# Patient Record
Sex: MALE | Race: WHITE
[De-identification: names, ages, dates, MRNs, and addresses within clinical notes are randomized per-mention and may not be internally consistent; named-entity substitution may affect disease eponyms.]

---

## 2020-08-29 ENCOUNTER — HOSPITAL ENCOUNTER (INPATIENT)
Dept: HOSPITAL 62 - ER | Age: 60
LOS: 13 days | Discharge: NURSING, REHAB OR LONG-TERM CARE | DRG: 640 | End: 2020-09-11
Attending: INTERNAL MEDICINE | Admitting: INTERNAL MEDICINE
Payer: MEDICARE

## 2020-08-29 DIAGNOSIS — Z82.49: ICD-10-CM

## 2020-08-29 DIAGNOSIS — K70.30: ICD-10-CM

## 2020-08-29 DIAGNOSIS — I11.0: ICD-10-CM
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
## 2020-08-29 DIAGNOSIS — I50.33: ICD-10-CM

## 2020-08-29 DIAGNOSIS — N28.9: ICD-10-CM

## 2020-08-29 DIAGNOSIS — D50.9: ICD-10-CM

## 2020-08-29 DIAGNOSIS — M10.9: ICD-10-CM

## 2020-08-29 DIAGNOSIS — Z79.899: ICD-10-CM

## 2020-08-29 DIAGNOSIS — E87.1: Primary | ICD-10-CM

## 2020-08-29 DIAGNOSIS — Z11.59: ICD-10-CM

## 2020-08-29 DIAGNOSIS — R00.1: ICD-10-CM

## 2020-08-29 DIAGNOSIS — G47.33: ICD-10-CM

## 2020-08-29 DIAGNOSIS — E87.5: ICD-10-CM

## 2020-08-29 DIAGNOSIS — D69.6: ICD-10-CM

## 2020-08-29 DIAGNOSIS — N17.9: ICD-10-CM

## 2020-08-29 DIAGNOSIS — E66.01: ICD-10-CM

## 2020-08-29 DIAGNOSIS — E83.42: ICD-10-CM

## 2020-08-29 PROCEDURE — 82746 ASSAY OF FOLIC ACID SERUM: CPT

## 2020-08-29 PROCEDURE — 36415 COLL VENOUS BLD VENIPUNCTURE: CPT

## 2020-08-29 PROCEDURE — 82607 VITAMIN B-12: CPT

## 2020-08-29 PROCEDURE — 82553 CREATINE MB FRACTION: CPT

## 2020-08-29 PROCEDURE — 93005 ELECTROCARDIOGRAM TRACING: CPT

## 2020-08-29 PROCEDURE — 96374 THER/PROPH/DIAG INJ IV PUSH: CPT

## 2020-08-29 PROCEDURE — 84484 ASSAY OF TROPONIN QUANT: CPT

## 2020-08-29 PROCEDURE — 99221 1ST HOSP IP/OBS SF/LOW 40: CPT

## 2020-08-29 PROCEDURE — 82550 ASSAY OF CK (CPK): CPT

## 2020-08-29 PROCEDURE — 85027 COMPLETE CBC AUTOMATED: CPT

## 2020-08-29 PROCEDURE — 82140 ASSAY OF AMMONIA: CPT

## 2020-08-29 PROCEDURE — 82962 GLUCOSE BLOOD TEST: CPT

## 2020-08-29 PROCEDURE — 84439 ASSAY OF FREE THYROXINE: CPT

## 2020-08-29 PROCEDURE — 99291 CRITICAL CARE FIRST HOUR: CPT

## 2020-08-29 PROCEDURE — 94660 CPAP INITIATION&MGMT: CPT

## 2020-08-29 PROCEDURE — 87070 CULTURE OTHR SPECIMN AEROBIC: CPT

## 2020-08-29 PROCEDURE — 83735 ASSAY OF MAGNESIUM: CPT

## 2020-08-29 PROCEDURE — 96361 HYDRATE IV INFUSION ADD-ON: CPT

## 2020-08-29 PROCEDURE — 85045 AUTOMATED RETICULOCYTE COUNT: CPT

## 2020-08-29 PROCEDURE — 87635 SARS-COV-2 COVID-19 AMP PRB: CPT

## 2020-08-29 PROCEDURE — 72100 X-RAY EXAM L-S SPINE 2/3 VWS: CPT

## 2020-08-29 PROCEDURE — 96375 TX/PRO/DX INJ NEW DRUG ADDON: CPT

## 2020-08-29 PROCEDURE — 81001 URINALYSIS AUTO W/SCOPE: CPT

## 2020-08-29 PROCEDURE — 83605 ASSAY OF LACTIC ACID: CPT

## 2020-08-29 PROCEDURE — 71045 X-RAY EXAM CHEST 1 VIEW: CPT

## 2020-08-29 PROCEDURE — 83935 ASSAY OF URINE OSMOLALITY: CPT

## 2020-08-29 PROCEDURE — 80076 HEPATIC FUNCTION PANEL: CPT

## 2020-08-29 PROCEDURE — 82728 ASSAY OF FERRITIN: CPT

## 2020-08-29 PROCEDURE — 83930 ASSAY OF BLOOD OSMOLALITY: CPT

## 2020-08-29 PROCEDURE — 83550 IRON BINDING TEST: CPT

## 2020-08-29 PROCEDURE — 80048 BASIC METABOLIC PNL TOTAL CA: CPT

## 2020-08-29 PROCEDURE — 82803 BLOOD GASES ANY COMBINATION: CPT

## 2020-08-29 PROCEDURE — 99285 EMERGENCY DEPT VISIT HI MDM: CPT

## 2020-08-29 PROCEDURE — 93010 ELECTROCARDIOGRAM REPORT: CPT

## 2020-08-29 PROCEDURE — 84443 ASSAY THYROID STIM HORMONE: CPT

## 2020-08-29 PROCEDURE — C9803 HOPD COVID-19 SPEC COLLECT: HCPCS

## 2020-08-29 PROCEDURE — 93306 TTE W/DOPPLER COMPLETE: CPT

## 2020-08-29 PROCEDURE — 83880 ASSAY OF NATRIURETIC PEPTIDE: CPT

## 2020-08-29 PROCEDURE — 94640 AIRWAY INHALATION TREATMENT: CPT

## 2020-08-29 PROCEDURE — 96376 TX/PRO/DX INJ SAME DRUG ADON: CPT

## 2020-08-29 PROCEDURE — 80061 LIPID PANEL: CPT

## 2020-08-29 PROCEDURE — 85025 COMPLETE CBC W/AUTO DIFF WBC: CPT

## 2020-08-29 PROCEDURE — 84300 ASSAY OF URINE SODIUM: CPT

## 2020-08-29 PROCEDURE — 84100 ASSAY OF PHOSPHORUS: CPT

## 2020-08-29 PROCEDURE — 80053 COMPREHEN METABOLIC PANEL: CPT

## 2020-08-29 PROCEDURE — 83540 ASSAY OF IRON: CPT

## 2020-08-29 PROCEDURE — P9047 ALBUMIN (HUMAN), 25%, 50ML: HCPCS

## 2020-08-30 LAB
ABSOLUTE LYMPHOCYTES# (MANUAL): 0.5 10^3/UL (ref 0.5–4.7)
ABSOLUTE MONOCYTES # (MANUAL): 1 10^3/UL (ref 0.1–1.4)
ADD MANUAL DIFF: YES
ALBUMIN SERPL-MCNC: 3.6 G/DL (ref 3.5–5)
ALBUMIN SERPL-MCNC: 3.6 G/DL (ref 3.5–5)
ALP SERPL-CCNC: 136 U/L (ref 38–126)
ALP SERPL-CCNC: 147 U/L (ref 38–126)
ANION GAP SERPL CALC-SCNC: 10 MMOL/L (ref 5–19)
ANION GAP SERPL CALC-SCNC: 10 MMOL/L (ref 5–19)
ANION GAP SERPL CALC-SCNC: 8 MMOL/L (ref 5–19)
ANION GAP SERPL CALC-SCNC: 9 MMOL/L (ref 5–19)
ANISOCYTOSIS BLD QL SMEAR: (no result)
APPEARANCE UR: CLEAR
APTT PPP: YELLOW S
ARTERIAL BLOOD FIO2: (no result)
ARTERIAL BLOOD H2CO3: 1.14 MMOL/L (ref 1.05–1.35)
ARTERIAL BLOOD HCO3: 20.1 MMOL/L (ref 20–24)
ARTERIAL BLOOD PCO2: 37.8 MMHG (ref 35–45)
ARTERIAL BLOOD PH: 7.34 (ref 7.35–7.45)
ARTERIAL BLOOD PO2: 97.5 MMHG (ref 80–100)
ARTERIAL BLOOD TOTAL CO2: 21.3 MMOL/L (ref 23–27)
AST SERPL-CCNC: 56 U/L (ref 17–59)
AST SERPL-CCNC: 84 U/L (ref 17–59)
BASE EXCESS BLDA CALC-SCNC: -5.1 MMOL/L
BASOPHILS NFR BLD MANUAL: 0 % (ref 0–2)
BILIRUB DIRECT SERPL-MCNC: 1.2 MG/DL (ref 0–0.4)
BILIRUB DIRECT SERPL-MCNC: 1.2 MG/DL (ref 0–0.4)
BILIRUB SERPL-MCNC: 2 MG/DL (ref 0.2–1.3)
BILIRUB SERPL-MCNC: 2.2 MG/DL (ref 0.2–1.3)
BILIRUB UR QL STRIP: NEGATIVE
BUN SERPL-MCNC: 32 MG/DL (ref 7–20)
BUN SERPL-MCNC: 33 MG/DL (ref 7–20)
BUN SERPL-MCNC: 33 MG/DL (ref 7–20)
BUN SERPL-MCNC: 34 MG/DL (ref 7–20)
BUN SERPL-MCNC: 36 MG/DL (ref 7–20)
BUN SERPL-MCNC: 37 MG/DL (ref 7–20)
CALCIUM: 8.7 MG/DL (ref 8.4–10.2)
CALCIUM: 9.2 MG/DL (ref 8.4–10.2)
CALCIUM: 9.3 MG/DL (ref 8.4–10.2)
CALCIUM: 9.4 MG/DL (ref 8.4–10.2)
CHLORIDE SERPL-SCNC: 83 MMOL/L (ref 98–107)
CHLORIDE SERPL-SCNC: 84 MMOL/L (ref 98–107)
CHLORIDE SERPL-SCNC: 86 MMOL/L (ref 98–107)
CHLORIDE SERPL-SCNC: 86 MMOL/L (ref 98–107)
CK MB SERPL-MCNC: 4.09 NG/ML (ref ?–4.55)
CK SERPL-CCNC: 184 U/L (ref 55–170)
CK SERPL-CCNC: 195 U/L (ref 55–170)
CO2 SERPL-SCNC: 21 MMOL/L (ref 22–30)
CO2 SERPL-SCNC: 22 MMOL/L (ref 22–30)
CO2 SERPL-SCNC: 23 MMOL/L (ref 22–30)
CO2 SERPL-SCNC: 24 MMOL/L (ref 22–30)
EOSINOPHIL NFR BLD MANUAL: 0 % (ref 0–6)
ERYTHROCYTE [DISTWIDTH] IN BLOOD BY AUTOMATED COUNT: 15.5 % (ref 11.5–14)
GLUCOSE SERPL-MCNC: 105 MG/DL (ref 75–110)
GLUCOSE SERPL-MCNC: 106 MG/DL (ref 75–110)
GLUCOSE SERPL-MCNC: 112 MG/DL (ref 75–110)
GLUCOSE SERPL-MCNC: 120 MG/DL (ref 75–110)
GLUCOSE SERPL-MCNC: 145 MG/DL (ref 75–110)
GLUCOSE SERPL-MCNC: 260 MG/DL (ref 75–110)
GLUCOSE UR STRIP-MCNC: NEGATIVE MG/DL
HCT VFR BLD CALC: 31 % (ref 37.9–51)
HGB BLD-MCNC: 10.8 G/DL (ref 13.5–17)
KETONES UR STRIP-MCNC: NEGATIVE MG/DL
MCH RBC QN AUTO: 33.9 PG (ref 27–33.4)
MCHC RBC AUTO-ENTMCNC: 34.7 G/DL (ref 32–36)
MCV RBC AUTO: 98 FL (ref 80–97)
MONOCYTES % (MANUAL): 6 % (ref 3–13)
NITRITE UR QL STRIP: NEGATIVE
OSMOLALITY,URINE: 297 MOSM/KG (ref 300–900)
PH UR STRIP: 6 [PH] (ref 5–9)
PLATELET # BLD: 85 10^3/UL (ref 150–450)
PLATELET COMMENT: (no result)
POLYCHROMASIA BLD QL SMEAR: SLIGHT
POTASSIUM SERPL-SCNC: 6.6 MMOL/L (ref 3.6–5)
POTASSIUM SERPL-SCNC: 6.7 MMOL/L (ref 3.6–5)
POTASSIUM SERPL-SCNC: 6.9 MMOL/L (ref 3.6–5)
POTASSIUM SERPL-SCNC: 7.1 MMOL/L (ref 3.6–5)
POTASSIUM SERPL-SCNC: 7.5 MMOL/L (ref 3.6–5)
POTASSIUM SERPL-SCNC: 7.6 MMOL/L (ref 3.6–5)
PROT SERPL-MCNC: 7 G/DL (ref 6.3–8.2)
PROT SERPL-MCNC: 7.1 G/DL (ref 6.3–8.2)
PROT UR STRIP-MCNC: NEGATIVE MG/DL
RBC # BLD AUTO: 3.17 10^6/UL (ref 4.35–5.55)
SAO2 % BLDA: 97.1 % (ref 94–98)
SCHISTOCYTES BLD QL SMEAR: SLIGHT
SEGMENTED NEUTROPHILS % (MAN): 91 % (ref 42–78)
SP GR UR STRIP: 1.01
TOTAL CELLS COUNTED BLD: 100
TROPONIN I SERPL-MCNC: < 0.012 NG/ML
URINE SODIUM: 34 MMOL/L (ref 30–90)
UROBILINOGEN UR-MCNC: NEGATIVE MG/DL (ref ?–2)
VARIANT LYMPHS NFR BLD MANUAL: 3 % (ref 13–45)
WBC # BLD AUTO: 16.1 10^3/UL (ref 4–10.5)

## 2020-08-30 RX ADMIN — CALCIUM GLUCONATE SCH: 20 INJECTION, SOLUTION INTRAVENOUS at 20:23

## 2020-08-30 RX ADMIN — IPRATROPIUM BROMIDE AND ALBUTEROL SULFATE SCH ML: 2.5; .5 SOLUTION RESPIRATORY (INHALATION) at 21:06

## 2020-08-30 RX ADMIN — LACTULOSE SCH GM: 20 SOLUTION ORAL at 19:18

## 2020-08-30 RX ADMIN — CALCIUM GLUCONATE SCH MLS/HR: 20 INJECTION, SOLUTION INTRAVENOUS at 19:18

## 2020-08-30 RX ADMIN — Medication SCH ML: at 22:50

## 2020-08-30 RX ADMIN — DEMECLOCYCLINE HYDROCHLORIDE SCH MG: 150 TABLET ORAL at 19:18

## 2020-08-30 RX ADMIN — DOCUSATE SODIUM SCH MG: 100 CAPSULE, LIQUID FILLED ORAL at 19:18

## 2020-08-30 RX ADMIN — DEXAMETHASONE SODIUM PHOSPHATE PRN MG: 10 INJECTION INTRAMUSCULAR; INTRAVENOUS at 20:48

## 2020-08-30 NOTE — CRITICAL CARE ADMISSION REPORT
HPI


Date:: 08/30/20


Time:: 17:00


Reason for ICU Reason:: Sever hyponatremia.  Significant hyperkalemia.  Anasarca


Admission Date/Time & PCP: 


Admission Date/Time: 08/30/20 14:50





 Primary Care Provider: 








BARTOLOME: 


The patient presents with generalized weakness and obtundation.


He fell in his home yesterday as well.


He lives with his sister.


The patient has a history significant for CHF and Cirrhosis. When he had 

routinelabs doen they showed severe hyponatremaiand hyperkalemia.


The patient was found to have an intial Na+ of 114 and potassium of 7.6.


His creatinine is 2.24. (for GFR of 30).


The patient has additional history of Atrial fib but denies being on antiembolic

treatment.


At this time the patient is conversant with me , speaks clearly and has decent 

comprehension.


His serum osmolality is 252.


His urien osmolality is 297 and urine Na 34.


The patient has fairly massive peripheral edema.


He has been on large dose of Spironolactone and lasix at home.


His ammonia levl is about 80.


he appears to be hemodynamically stable





Plan Summary: 


Assessment and plan


Hyponatremia


This is a patient who appears to have hypotonic hypervolemic hyponatremia. It 

apears that the


cause may be multifactorial.


The patient has a history of cirrhosis ( on which basis is may have 

hypoaldosteronism and spironolactone, volume overload due to CHF.


The patient has been treated with small doses of NACL not likely to have much of

an impact as his urine osmolarity is fairly close to theosmiolality of NACL.


I have added salt tablets and laced the patient on declomycin (ADH inhibitor). I

have started large dose IV lasic as well. We will monotor


the lytes q 6h or more frequently if need be.


Hyperkalemai


His potassium has trended down slightly. He does not appear to have typical 

changes of hyperkalemia on his ECG other than slight peaking of T waves in some 

of the precordial leads.


Will continue to trend potassium.


The patient received glucose and insulin, kayexelate,bicarbonate.


CHF


I have asked for an ECHO. I have asked Dr Ramirez to see the patient. His heart 

rate at resent appears reasonably well controlled.


I am not sure why he is not on anticoagulation long term.





EDD


?I have oredered a ABG. I suspect that thepatient has EDD based onhis bbody 

habitus but he denies it.





Cirrhosis


The patient has no history of ascites. 


he does have an elevate ammonia level. 


I have started lactulose





Past Medical History


Cardiac Medical History: Reports: Atrial Fibrillation





Social/Family History





- Social History


Lives with: Family


Smoking Status: Unknown if Ever Smoked





- Medication/Allergies


Home Medications: 








Carvedilol [Coreg 12.5 mg Tablet] 12.5 mg PO Q12 08/30/20 


Colchicine [Colcrys 0.6 mg Tablet] 1 tab PO BID 08/30/20 


Furosemide [Lasix 20 mg Tablet] 20 mg PO BID 08/30/20 


Gabapentin [Neurontin] 800 mg PO TID 08/30/20 


Multivitamin [Tab-A-Obed] 1 each PO DAILY 08/30/20 


Omeprazole 40 mg PO DAILY 08/30/20 


Ondansetron [Zofran Odt 4 mg Tablet] 4 mg PO BIDP PRN 08/30/20 


Rifaximin [Xifaxan 550 Mg Tablet] 550 mg PO QHS 08/30/20 


Spironolactone [Aldactone 100 mg Tablet] 1 tab PO Q12 08/30/20 


Turmeric/Turmeric Root Extract [Turmeric 500 mg Capsule] 1 each PO DAILY 

08/30/20 








Allergies/Adverse Reactions: 


                                        





No Known Allergies Allergy (Unverified 08/30/20 01:24)


   











Review of Systems


Constitutional: PRESENT: weight gain.  ABSENT: chills, fatigue, fever(s)


Eyes: ABSENT: visual disturbances


Ears: ABSENT: hearing changes


Nose, Mouth, and Throat: ABSENT: mouth pain, sore throat


Cardiovascular: PRESENT: dyspnea on exertion, edema


Respiratory: ABSENT: cough, sputum


Gastrointestinal: PRESENT: dysphagia.  ABSENT: abdominal pain, bloating


Musculoskeletal: ABSENT: joint swelling, muscle weakness


Integumentary: ABSENT: erythema, lesions


Neurological: PRESENT: frequent falls, other - The patient was reportedly 

somnolent but arousable onpresentationto the ED.


Endocrine: ABSENT: polydipsia


Hematologic/Lymphatic: ABSENT: easy bruising





Physical Exam


Vital Signs: 


                                        











Temp Pulse Resp BP Pulse Ox


 


       9 L  123/97 H  100 


 


       08/30/20 16:01  08/30/20 16:01  08/30/20 16:01








                                 Intake & Output











 08/29/20 08/30/20 08/31/20





 06:59 06:59 06:59


 


Intake Total  500 


 


Balance  500 


 


Weight  88.5 kg 








                                  Weight/Height





Weight                           88.5 kg


Height                           5 ft 9 in








General appearance: PRESENT: no acute distress, morbidly obese


Head exam: PRESENT: atraumatic, normocephalic


Eye exam: PRESENT: conjunctiva pink


Ear exam: PRESENT: normal external ear exam


Mouth exam: PRESENT: moist, neck supple


Neck exam: ABSENT: JVD, lymphadenopathy, tenderness, thyromegaly


Respiratory exam: PRESENT: clear to auscultation karma.  ABSENT: accessory muscle 

use


Cardiovascular exam: PRESENT: irregular rhythm


Pulses: PRESENT: normal carotid pulses, +2 pedal pulses bilateral


GI/Abdominal exam: PRESENT: normal bowel sounds, other - Large abdominal 

panniculus. Unclear oif there is hepatomegaly


Rectal exam: PRESENT: deferred


Gentrourinary exam: ABSENT: scrotal swelling


Extremities exam: PRESENT: pedal edema, other - The patient has 4+ edema.  

ABSENT: calf tenderness, clubbing


Neurological exam: PRESENT: awake, oriented to person, oriented to place, 

oriented to time, oriented to situation, motor sensory deficit.  ABSENT: aphasic





Laboratory/Radiographs


Laboratory Results: 


                                        





                                 08/30/20 00:23 





                                 08/30/20 11:26 





                                        











  08/30/20 08/30/20 08/30/20





  00:03 00:23 00:23


 


WBC   16.1 H 


 


RBC   3.17 L 


 


Hgb   10.8 L 


 


Hct   31.0 L 


 


MCV   98 H 


 


MCH   33.9 H 


 


MCHC   34.7 


 


RDW   15.5 H 


 


Plt Count   85 L 


 


Seg Neutrophils %   Not Reportable 


 


Sodium    114.8 L*


 


Potassium    7.6 H*


 


Chloride    83 L


 


Carbon Dioxide    22


 


Anion Gap    10


 


BUN    33 H


 


Creatinine    2.24 H


 


Est GFR ( Amer)    36 L


 


Glucose    106


 


Serum Osmolality   


 


Calcium    9.4


 


Total Bilirubin    2.2 H


 


AST    56


 


Alkaline Phosphatase    147 H


 


Ammonia   


 


Total Protein    7.0


 


Albumin    3.6


 


Urine Color  YELLOW  


 


Urine Appearance  CLEAR  


 


Urine pH  6.0  


 


Ur Specific Gravity  1.009  


 


Urine Protein  NEGATIVE  


 


Urine Glucose (UA)  NEGATIVE  


 


Urine Ketones  NEGATIVE  


 


Urine Blood  NEGATIVE  


 


Urine Nitrite  NEGATIVE  


 


Ur Leukocyte Esterase  NEGATIVE  


 


Urine WBC (Auto)  0  


 


Urine Osmolality   














  08/30/20 08/30/20 08/30/20





  03:37 03:37 06:41


 


WBC   


 


RBC   


 


Hgb   


 


Hct   


 


MCV   


 


MCH   


 


MCHC   


 


RDW   


 


Plt Count   


 


Seg Neutrophils %   


 


Sodium  113.7 L*  


 


Potassium  6.9 H*  


 


Chloride  84 L  


 


Carbon Dioxide  21 L  


 


Anion Gap  9  


 


BUN  33 H  


 


Creatinine  2.15 H  


 


Est GFR ( Amer)  38 L  


 


Glucose  120 H  


 


Serum Osmolality   252 L 


 


Calcium  9.2  


 


Total Bilirubin   


 


AST   


 


Alkaline Phosphatase   


 


Ammonia    89.3 H


 


Total Protein   


 


Albumin   


 


Urine Color   


 


Urine Appearance   


 


Urine pH   


 


Ur Specific Gravity   


 


Urine Protein   


 


Urine Glucose (UA)   


 


Urine Ketones   


 


Urine Blood   


 


Urine Nitrite   


 


Ur Leukocyte Esterase   


 


Urine WBC (Auto)   


 


Urine Osmolality   














  08/30/20 08/30/20 08/30/20





  08:11 08:17 11:26


 


WBC   


 


RBC   


 


Hgb   


 


Hct   


 


MCV   


 


MCH   


 


MCHC   


 


RDW   


 


Plt Count   


 


Seg Neutrophils %   


 


Sodium  116.4 L*   117.2 L*


 


Potassium  7.1 H*   6.7 H*


 


Chloride  83 L   86 L


 


Carbon Dioxide  24   22


 


Anion Gap  9   9


 


BUN  32 H   34 H


 


Creatinine  2.26 H   2.11 H


 


Est GFR ( Amer)  36 L   39 L


 


Glucose  112 H   260 H


 


Serum Osmolality   


 


Calcium  9.2   8.7


 


Total Bilirubin   


 


AST   


 


Alkaline Phosphatase   


 


Ammonia   


 


Total Protein   


 


Albumin   


 


Urine Color   


 


Urine Appearance   


 


Urine pH   


 


Ur Specific Gravity   


 


Urine Protein   


 


Urine Glucose (UA)   


 


Urine Ketones   


 


Urine Blood   


 


Urine Nitrite   


 


Ur Leukocyte Esterase   


 


Urine WBC (Auto)   


 


Urine Osmolality   297 L 








                                        











  08/30/20 08/30/20 08/30/20





  00:23 00:23 03:37


 


Creatine Kinase  195 H   184 H


 


CK-MB (CK-2)   4.09 


 


Troponin I   < 0.012 











Impressions: 


                                        





Lumbar Spine X-Ray  08/30/20 00:05


IMPRESSION:


No acute bony injury is identified.


 








Chest X-Ray  08/30/20 14:47


IMPRESSION:  Hypoventilated exam without evidence of acute pulmonary process.


 














Critical Time


Critical Time (minutes): 50


-: 


The care of a critically ill patient is dynamic.  This note represents a static 

moment in the admission process. Orders and treatments may be given 

simultaneously and urgently, and time is not representative of the treatment 

process.





This patient requires Critical Care secondary to life threatening organ or limb 

dysfunction.  Without Critical Care services, the patient is at risk for 

increased mortality and morbidity.

## 2020-08-30 NOTE — RADIOLOGY REPORT (SQ)
INDICATION:  s/p fall, c/o back pain.



TECHNIQUE:  2 view(s) of the lumbar spine.



COMPARISON:  None



FINDINGS:  No evidence of acute displaced fracture.  Grade 1

anterior spondylolisthesis of L5 on S1.  Osteoarthritis. 

Vertebral body heights are well-maintained. Vascular

calcification.



IMPRESSION:

No acute bony injury is identified.

## 2020-08-30 NOTE — RADIOLOGY REPORT (SQ)
EXAM DESCRIPTION:  CHEST SINGLE VIEW



IMAGES COMPLETED DATE/TIME:  8/30/2020 8:57 am



REASON FOR STUDY:  eval conslidation



COMPARISON:  None.



EXAM PARAMETERS:  NUMBER OF VIEWS: One view.

TECHNIQUE: Single frontal radiographic view of the chest acquired.

RADIATION DOSE: NA

LIMITATIONS: Lordotic portable film, obese patient



FINDINGS:  LUNGS AND PLEURA: No opacities, masses or pneumothorax. No pleural effusion.

MEDIASTINUM AND HILAR STRUCTURES: No masses.  Contour normal.

HEART AND VASCULAR STRUCTURES: Heart normal in size.  Normal vasculature.

BONES: No acute findings.

HARDWARE: None in the chest.

OTHER: No other significant finding.



IMPRESSION:  NO ACUTE RADIOGRAPHIC FINDING IN THE CHEST.



TECHNICAL DOCUMENTATION:  JOB ID:  [id number]

 2011 [employer]- All Rights Reserved



Reading location - IP/workstation name: [email or web address]

## 2020-08-30 NOTE — EKG REPORT
SEVERITY:- ABNORMAL ECG -

ATRIAL FIBRILLATION

IVCD, CONSIDER ATYPICAL LBBB

:

Confirmed by: Barbara Torre MD 30-Aug-2020 10:31:44

## 2020-08-30 NOTE — ER DOCUMENT REPORT
Entered by NATALIE CONNER SCRIBE  08/30/20 0008 





Acting as scribe for:SHAYNE MUÑIZ IV, MD





ED Fall





- General


Mode of Arrival: Medic


Information source: Patient, Emergency Med Personnel





<SHAYNE MUÑIZ IV - Last Filed: 08/30/20 06:51>





<NATASHA DUNCANIS - Last Filed: 08/30/20 15:04>





- General


Chief Complaint: General Weakness


Stated Complaint: FALL


Time Seen by Provider: 08/29/20 23:55


Notes: 





This 60 year old male patient brought in by EMS from home presents to the ED 

today with complaints of generalized weakness for the last x1 week and a fall 

that occurred just prior to arrival. Patient states that he was getting up to 

use the bathroom when his knees buckled and he fell onto the floor. He mentions 

that he is supposed to have both knees replaced; however, the surgeon will not 

do them due to his liver failure and risk of bleeding per nursing. Reports lower

back pain, but denies hitting his head or LOC. Patient states that he recently 

moved here with his sister from [place], [place]. Nursing reports a past medical history

of A fib. Denies any other complaints.


 (SHAYNE MUÑIZ IV)





- Related data


Allergies/Adverse Reactions: 


                                        





No Known Allergies Allergy (Unverified 08/30/20 01:24)


   











Past Medical History





- General


Information source: Emergency Med Personnel





- Social History


Smoking Status: Unknown if Ever Smoked


Smoking Education Provided: No


Lives with: Family


Family History: Reviewed & Not Pertinent





- Past Medical History


Cardiac Medical History: Reports: Hx Atrial Fibrillation


GI Medical History: Reports: Hx Liver Failure





<SHAYNE MUÑIZ IV - Last Filed: 08/30/20 06:51>





Review of Systems





- Review of Systems


Constitutional: No symptoms reported


EENT: No symptoms reported


Cardiovascular: No symptoms reported


Respiratory: No symptoms reported


Gastrointestinal: No symptoms reported


Genitourinary: No symptoms reported


Male Genitourinary: No symptoms reported


Musculoskeletal: See HPI, Back pain


Skin: No symptoms reported


Hematologic/Lymphatic: No symptoms reported


Neurological/Psychological: See HPI.  denies: Lost consciousness, Headaches


-: Yes All other systems reviewed and negative





<SHAYNE MUÑIZ IV - Last Filed: 08/30/20 06:51>





Physical Exam





- General


General appearance: Alert


In distress: None





- HEENT


Head: Normocephalic, Atraumatic


Eyes: Other - Right eye prosthetic





- Respiratory


Respiratory status: No respiratory distress


Chest status: Nontender


Breath sounds: Normal


Chest palpation: Normal





- Cardiovascular


Rhythm: Regular


Heart sounds: Normal auscultation


Murmur: No


Friction rub: No


Gallop: None auscultated





- Abdominal


Inspection: Morbidly Obese


Distension: No distension


Bowel sounds: Normal


Tenderness: Nontender - Abdomen soft


Organomegaly: No organomegaly





- Back


Back: Normal, Nontender





- Extremities


General upper extremity: Normal inspection


General lower extremity: Edema - 2+ pitting edema to bilateral lower extremities





- Neurological


Neuro grossly intact: Yes


Orientation: AAOx4


[place] Coma Scale Eye Opening: Spontaneous


[place] Coma Scale Verbal: Oriented


[place] Coma Scale Motor: Obeys Commands


[place] Coma Scale Total: 15





- Psychological


Associated symptoms: Normal affect, Normal mood





- Skin


Skin Temperature: Warm


Skin Moisture: Dry


Skin Color: Normal





<SHAYNE MUÑIZ IV - Last Filed: 08/30/20 06:51>





- Vital signs


Vitals: 


                                        











Resp


 


 13 


 


 08/29/20 23:54














Course





- Laboratory


Result Diagrams: 


                                 08/30/20 00:23





                                 08/30/20 03:37





- Consults


  ** dr gaona


Time consulted: 05:20 - stated pt would need to be in icu for hypertonic saline





  ** kiko yanez np, intensivist


Time consulted: 05:30 - no icu beds available for 3 days





- Transfer of Care


Care transferred to following provider: dr. paz at 0647





<SHAYNE MUÑIZ IV - Last Filed: 08/30/20 06:51>





- Laboratory


Result Diagrams: 


                                 08/30/20 00:23





                                 08/30/20 11:26





<KYLER DUNCAN - Last Filed: 08/30/20 15:04>





- Re-evaluation


Re-evalutation: 





08/30/20 06:51


This md discussed pt's status with sister Imani and likely transfer to [facility] (SHAYNE MUÑIZ IV)





08/30/20 07:55


Late entry due to EMR downtime.  At approximately 7:15 AM I discussed with the 

ICU attending at [facility].  Per discussion patient does not meet ICU criteria at this

time and he was not accepted in transfer.  Per our discussion, intensivist 

believes that these are likely chronic issues, he recommended normal saline and 

possibly albumin.  States that he is not in acute renal failure and does not 

need dialysis at this time.  Suspecting due to Lasix and Aldactone use.  He 

recommended against the use of hypertonic saline.


At approximately 0745 I discussed with our hospitalist team here, Dr. Ricketts is 

still adamant that this patient needs hypertonic saline and does not think he is

appropriate for the IMCU at this time.


I have gone in to evaluate the patient, I do not think that he exhibits overt 

altered mental status.  I will start him on D5 normal saline infusion, recheck 

BMP.





08/30/20 08:11


I have discussed the case with our intensivist Dr. Finn.  Potentially 

there might be an ICU bed available in a few hours.  Has requested urine sodium 

and osmolarity, serum osmolarity.  Given that his creatinine is not markedly 

elevated, anticipate that his potassium will downtrend.  I will continue to 

manage patient in the emergency department until definitive answer if bed is 

opening.





08/30/20 08:56


Repeat BMP has been obtained.  Potassium is 7.1, was 6.9, is overall lower from 

previous value.  Will re-administer shifting agents: Calcium, glucose, insulin 

and albuterol.  Sodium has improved, currently 116





08/30/20 11:37


Patient sister is now at bedside, I did updated her on the current plan.  

Patient says that he is feeling sick, will order Zofran.





08/30/20 12:46


Sodium has further increased and potassium has further decreased.  Creatinine 

has remained about the same.  I rediscussed with our ICU attending Dr. Finn, he will be going to our ICU here. (KYLER DUNCAN)





- Vital Signs


Vital signs: 


                                        











Temp Pulse Resp BP Pulse Ox


 


       15   133/72 H  97 


 


       08/30/20 13:01  08/30/20 13:01  08/30/20 13:01














- Laboratory


Laboratory results interpreted by me: 


                                        











  08/30/20 08/30/20 08/30/20





  00:23 00:23 03:37


 


WBC  16.1 H  


 


RBC  3.17 L  


 


Hgb  10.8 L  


 


Hct  31.0 L  


 


MCV  98 H  


 


MCH  33.9 H  


 


RDW  15.5 H  


 


Plt Count  85 L  


 


Seg Neuts % (Manual)  91 H  


 


Lymphocytes % (Manual)  3 L  


 


Abs Neuts (Manual)  14.7 H  


 


Sodium   114.8 L*  113.7 L*


 


Potassium   7.6 H*  6.9 H*


 


Chloride   83 L  84 L


 


Carbon Dioxide    21 L


 


BUN   33 H  33 H


 


Creatinine   2.24 H  2.15 H


 


Est GFR ( Amer)   36 L  38 L


 


Est GFR (MDRD) Non-Af   30 L  32 L


 


Glucose    120 H


 


POC Glucose   


 


Serum Osmolality   


 


Total Bilirubin   2.2 H 


 


Direct Bilirubin   1.2 H 


 


Alkaline Phosphatase   147 H 


 


Ammonia   


 


Creatine Kinase   195 H  184 H


 


Urine Osmolality   














  08/30/20 08/30/20 08/30/20





  03:37 06:41 08:11


 


WBC   


 


RBC   


 


Hgb   


 


Hct   


 


MCV   


 


MCH   


 


RDW   


 


Plt Count   


 


Seg Neuts % (Manual)   


 


Lymphocytes % (Manual)   


 


Abs Neuts (Manual)   


 


Sodium    116.4 L*


 


Potassium    7.1 H*


 


Chloride    83 L


 


Carbon Dioxide   


 


BUN    32 H


 


Creatinine    2.26 H


 


Est GFR ( Amer)    36 L


 


Est GFR (MDRD) Non-Af    30 L


 


Glucose    112 H


 


POC Glucose   


 


Serum Osmolality  252 L  


 


Total Bilirubin   


 


Direct Bilirubin   


 


Alkaline Phosphatase   


 


Ammonia   89.3 H 


 


Creatine Kinase   


 


Urine Osmolality   














  08/30/20 08/30/20 08/30/20





  08:17 09:31 11:26


 


WBC   


 


RBC   


 


Hgb   


 


Hct   


 


MCV   


 


MCH   


 


RDW   


 


Plt Count   


 


Seg Neuts % (Manual)   


 


Lymphocytes % (Manual)   


 


Abs Neuts (Manual)   


 


Sodium    117.2 L*


 


Potassium    6.7 H*


 


Chloride    86 L


 


Carbon Dioxide   


 


BUN    34 H


 


Creatinine    2.11 H


 


Est GFR ( Amer)    39 L


 


Est GFR (MDRD) Non-Af    32 L


 


Glucose    260 H


 


POC Glucose   127 H 


 


Serum Osmolality   


 


Total Bilirubin   


 


Direct Bilirubin   


 


Alkaline Phosphatase   


 


Ammonia   


 


Creatine Kinase   


 


Urine Osmolality  297 L  














  08/30/20





  11:26


 


WBC 


 


RBC 


 


Hgb 


 


Hct 


 


MCV 


 


MCH 


 


RDW 


 


Plt Count 


 


Seg Neuts % (Manual) 


 


Lymphocytes % (Manual) 


 


Abs Neuts (Manual) 


 


Sodium 


 


Potassium 


 


Chloride 


 


Carbon Dioxide 


 


BUN 


 


Creatinine 


 


Est GFR ( Amer) 


 


Est GFR (MDRD) Non-Af 


 


Glucose 


 


POC Glucose  115 H


 


Serum Osmolality 


 


Total Bilirubin 


 


Direct Bilirubin 


 


Alkaline Phosphatase 


 


Ammonia 


 


Creatine Kinase 


 


Urine Osmolality 














- EKG Interpretation by Me


Additional EKG results interpreted by me: 





08/30/20 06:46


EKG obtained on 8/30/2020 at 00 03 hours was interpreted by this MD.  Findings: 

Sinus bradycardia, rate 58, normal axis, P waves proceed QRS complexes, QRS 

complexes appear narrow, there are no obvious patterns of ST segment elevation 

or depression present to suggest acute myocardial ischemia or infarction.  

Impression: Sinus bradycardia with nonspecific ST segments (SHAYNE MUÑIZ IV)





- Consults


  ** dr gaoan


Reason for consultation: 





08/30/20 06:52


low na, high k (SHAYNE MUÑIZ IV)





  ** kiko yanez, np, intensivist


Reason for consultation: 





08/30/20 06:54


pt needs hypertonic saline (SHAYNE MUÑIZ IV)





Critical Care Note





- Critical Care Note


Total time excluding time spent on procedures (mins): 40





<KYLER DUNCAN - Last Filed: 08/30/20 15:04>





- Critical Care Note


Comments: 





Critical care time spent managing hyponatremia, hyperkalemia, multiple 

reassessments of patient and coordination of care with multiple physicians. 

(KYLER DUNCAN)





Discharge





<SHAYNE MUÑIZ IV - Last Filed: 08/30/20 06:51>





- Discharge


Unit Admitted: ICU





<KYLER DUNCAN - Last Filed: 08/30/20 15:04>





- Discharge


Clinical Impression: 


 Hyperkalemia, Hyponatremia, Renal insufficiency





Condition: Stable


Disposition: ADMITTED AS INPATIENT





I personally performed the services described in the documentation, reviewed and

edited the documentation which was dictated to the scribe in my presence, and it

accurately records my words and actions.

## 2020-08-30 NOTE — RADIOLOGY REPORT (SQ)
EXAM DESCRIPTION:  CHEST SINGLE VIEW



IMAGES COMPLETED DATE/TIME:  8/30/2020 3:03 pm



REASON FOR STUDY:  cough



COMPARISON:  Chest radiograph earlier same day



NUMBER OF VIEWS:  One view.



TECHNIQUE:  Single frontal radiographic view of the chest acquired.



LIMITATIONS:  None.



FINDINGS:  LUNGS AND PLEURA: Low lung volumes with resultant bronchovascular crowding.  No focal pulm
onary opacity, pleural effusion, or pneumothorax.

MEDIASTINUM AND HILAR STRUCTURES: No masses.  Contour normal.

HEART AND VASCULAR STRUCTURES: Heart normal in size.  Normal vasculature.

BONES: No acute findings.

HARDWARE: None in the chest.

OTHER: No other significant finding.



IMPRESSION:  Hypoventilated exam without evidence of acute pulmonary process.



TECHNICAL DOCUMENTATION:  JOB ID:  [id number]

 2011 [employer]- All Rights Reserved



Reading location - IP/workstation name: [email or web address]

## 2020-08-31 LAB
ADD MANUAL DIFF: NO
ALBUMIN SERPL-MCNC: 3.7 G/DL (ref 3.5–5)
ALP SERPL-CCNC: 112 U/L (ref 38–126)
ANION GAP SERPL CALC-SCNC: 12 MMOL/L (ref 5–19)
ANION GAP SERPL CALC-SCNC: 7 MMOL/L (ref 5–19)
AST SERPL-CCNC: 100 U/L (ref 17–59)
BASOPHILS # BLD AUTO: 0 10^3/UL (ref 0–0.2)
BASOPHILS NFR BLD AUTO: 0.5 % (ref 0–2)
BILIRUB DIRECT SERPL-MCNC: 1.2 MG/DL (ref 0–0.4)
BILIRUB SERPL-MCNC: 2.1 MG/DL (ref 0.2–1.3)
BUN SERPL-MCNC: 38 MG/DL (ref 7–20)
BUN SERPL-MCNC: 39 MG/DL (ref 7–20)
CALCIUM: 8.9 MG/DL (ref 8.4–10.2)
CALCIUM: 9.2 MG/DL (ref 8.4–10.2)
CHLORIDE SERPL-SCNC: 86 MMOL/L (ref 98–107)
CHLORIDE SERPL-SCNC: 88 MMOL/L (ref 98–107)
CHOLEST SERPL-MCNC: 124.27 MG/DL (ref 0–200)
CO2 SERPL-SCNC: 22 MMOL/L (ref 22–30)
CO2 SERPL-SCNC: 26 MMOL/L (ref 22–30)
EOSINOPHIL # BLD AUTO: 0.1 10^3/UL (ref 0–0.6)
EOSINOPHIL NFR BLD AUTO: 0.5 % (ref 0–6)
ERYTHROCYTE [DISTWIDTH] IN BLOOD BY AUTOMATED COUNT: 15.7 % (ref 11.5–14)
FREE T4 (FREE THYROXINE): 1.47 NG/DL (ref 0.78–2.19)
GLUCOSE SERPL-MCNC: 109 MG/DL (ref 75–110)
GLUCOSE SERPL-MCNC: 94 MG/DL (ref 75–110)
HCT VFR BLD CALC: 25.6 % (ref 37.9–51)
HGB BLD-MCNC: 9.2 G/DL (ref 13.5–17)
LDLC SERPL DIRECT ASSAY-MCNC: 62 MG/DL (ref ?–100)
LYMPHOCYTES # BLD AUTO: 0.9 10^3/UL (ref 0.5–4.7)
LYMPHOCYTES NFR BLD AUTO: 8.6 % (ref 13–45)
MCH RBC QN AUTO: 35 PG (ref 27–33.4)
MCHC RBC AUTO-ENTMCNC: 36.1 G/DL (ref 32–36)
MCV RBC AUTO: 97 FL (ref 80–97)
MONOCYTES # BLD AUTO: 1.5 10^3/UL (ref 0.1–1.4)
MONOCYTES NFR BLD AUTO: 14.4 % (ref 3–13)
NEUTROPHILS # BLD AUTO: 7.8 10^3/UL (ref 1.7–8.2)
NEUTS SEG NFR BLD AUTO: 76 % (ref 42–78)
PHOSPHATE SERPL-MCNC: 5.4 MG/DL (ref 2.5–4.5)
PLATELET # BLD: 71 10^3/UL (ref 150–450)
POTASSIUM SERPL-SCNC: 5.6 MMOL/L (ref 3.6–5)
POTASSIUM SERPL-SCNC: 6.4 MMOL/L (ref 3.6–5)
PROT SERPL-MCNC: 6.6 G/DL (ref 6.3–8.2)
RBC # BLD AUTO: 2.64 10^6/UL (ref 4.35–5.55)
TOTAL CELLS COUNTED % (AUTO): 100 %
TRIGL SERPL-MCNC: 105 MG/DL (ref ?–150)
TSH SERPL-ACNC: 9.67 UIU/ML (ref 0.47–4.68)
VLDLC SERPL CALC-MCNC: 21 MG/DL (ref 10–31)
WBC # BLD AUTO: 10.2 10^3/UL (ref 4–10.5)

## 2020-08-31 RX ADMIN — IPRATROPIUM BROMIDE AND ALBUTEROL SULFATE SCH ML: 2.5; .5 SOLUTION RESPIRATORY (INHALATION) at 08:40

## 2020-08-31 RX ADMIN — Medication SCH ML: at 06:38

## 2020-08-31 RX ADMIN — LACTULOSE SCH GM: 20 SOLUTION ORAL at 18:18

## 2020-08-31 RX ADMIN — INSULIN HUMAN SCH: 100 INJECTION, SOLUTION PARENTERAL at 09:41

## 2020-08-31 RX ADMIN — ALBUMIN (HUMAN) SCH MLS/HR: 12.5 SOLUTION INTRAVENOUS at 00:05

## 2020-08-31 RX ADMIN — CARVEDILOL SCH MG: 12.5 TABLET, FILM COATED ORAL at 22:06

## 2020-08-31 RX ADMIN — ALBUMIN (HUMAN) SCH MLS/HR: 12.5 SOLUTION INTRAVENOUS at 01:41

## 2020-08-31 RX ADMIN — DEMECLOCYCLINE HYDROCHLORIDE SCH MG: 150 TABLET ORAL at 18:18

## 2020-08-31 RX ADMIN — SODIUM CHLORIDE PRN MLS/HR: 9 INJECTION, SOLUTION INTRAVENOUS at 18:18

## 2020-08-31 RX ADMIN — COLCHICINE SCH MG: 0.6 TABLET, FILM COATED ORAL at 18:18

## 2020-08-31 RX ADMIN — Medication SCH: at 13:43

## 2020-08-31 RX ADMIN — ALBUMIN (HUMAN) SCH MLS/HR: 12.5 SOLUTION INTRAVENOUS at 02:06

## 2020-08-31 RX ADMIN — Medication SCH: at 22:06

## 2020-08-31 RX ADMIN — FUROSEMIDE SCH MG: 20 TABLET ORAL at 18:18

## 2020-08-31 RX ADMIN — DEXAMETHASONE SODIUM PHOSPHATE PRN MG: 10 INJECTION INTRAMUSCULAR; INTRAVENOUS at 01:43

## 2020-08-31 RX ADMIN — IPRATROPIUM BROMIDE AND ALBUTEROL SULFATE SCH ML: 2.5; .5 SOLUTION RESPIRATORY (INHALATION) at 12:35

## 2020-08-31 RX ADMIN — ALBUMIN (HUMAN) SCH MLS/HR: 12.5 SOLUTION INTRAVENOUS at 03:06

## 2020-08-31 RX ADMIN — IPRATROPIUM BROMIDE AND ALBUTEROL SULFATE SCH ML: 2.5; .5 SOLUTION RESPIRATORY (INHALATION) at 19:43

## 2020-08-31 RX ADMIN — DOCUSATE SODIUM SCH MG: 100 CAPSULE, LIQUID FILLED ORAL at 10:50

## 2020-08-31 RX ADMIN — GABAPENTIN SCH MG: 400 CAPSULE ORAL at 14:44

## 2020-08-31 RX ADMIN — DOCUSATE SODIUM SCH MG: 100 CAPSULE, LIQUID FILLED ORAL at 18:18

## 2020-08-31 RX ADMIN — LACTULOSE SCH GM: 20 SOLUTION ORAL at 10:49

## 2020-08-31 RX ADMIN — RIFAXIMIN SCH MG: 550 TABLET ORAL at 22:06

## 2020-08-31 RX ADMIN — IPRATROPIUM BROMIDE AND ALBUTEROL SULFATE SCH ML: 2.5; .5 SOLUTION RESPIRATORY (INHALATION) at 17:04

## 2020-08-31 RX ADMIN — SODIUM CHLORIDE TAB 1 GM SCH GM: 1 TAB at 10:50

## 2020-08-31 RX ADMIN — GABAPENTIN SCH MG: 400 CAPSULE ORAL at 22:06

## 2020-08-31 RX ADMIN — DEMECLOCYCLINE HYDROCHLORIDE SCH MG: 150 TABLET ORAL at 10:50

## 2020-08-31 NOTE — XCELERA REPORT
[address] [address]

                               Tel: [phone]

                               Fax: [phone]



                      Transthoracic Echocardiogram Report

_______________________________________________________________________________



Name: SULY VILLALPANDO

MRN: [id number]                           Age: 60 yrs

Gender: Male                              : 1960

Patient Status: Inpatient                 Patient Location: ICU^607^A

Account #: [id number]

Study Date: 2020 11:45 AM

Accession #: [id number]

_______________________________________________________________________________



Height: 69 in        Weight: 313 lb        BSA: 2.5 m2

_______________________________________________________________________________



Reason For Study: LV dysfunction





Ordering Physician: FREDDIE BELTRAN

Performed By: Mayi Rodriguez



_______________________________________________________________________________



Interpretation Summary

The left ventricle is mildly dilated.

Left ventricular systolic function is normal.

The Ejection Fraction estimate is 60-65%.

Doppler measurements suggest pseudonormalized left ventricular relaxation,

which is associated with grade II/IV or mild to moderate diastolic

dysfunction.

Regional wall motion assessment is limited by suboptimal windows however it

appears normal.

Trace to mild MR, mild TR, mild PI.

Moderate pulmonary hypertension.

Best estimated RVSP is approximately 50-55 mm/Hg.

No prior studies for comparison.



MMode/2D Measurements & Calculations

RVDd: 2.9 cm  LVIDd: 5.8 cm   FS: 40.4 %             Ao root diam: 2.5 cm

IVSd: 1.0 cm  LVIDs: 3.4 cm   EDV(Teich): 165.2 ml

              LVPWd: 1.0 cm   ESV(Teich): 48.9 ml    Ao root area: 4.8 cm2

                                                     LA dimension: 5.7 cm

                              EF(Teich): 70.4 %



Doppler Measurements & Calculations

MV E max pepito:      MV P1/2t max pepito:     Ao V2 max:         LV V1 max P.3 cm/sec       151.8 cm/sec          200.9 cm/sec       8.6 mmHg

MV A max pepito:      MV P1/2t: 86.3 msec   Ao max PG:         LV V1 max:

109.2 cm/sec       MVA(P1/2t): 2.5 cm2   16.1 mmHg          146.8 cm/sec

MV E/A: 1.4        MV dec slope:



                   515.3 cm/sec2

                   MV dec time: 0.27 sec

        _______________________________________________________________

PA V2 max:         TR max pepito:           MV P1/2t-pr_phl:

133.9 cm/sec       335.3 cm/sec          86.3 msec

PA max P.2 mmHgTR max P.0 mmHg





Left Ventricle

The left ventricle is mildly dilated. Left ventricular systolic function is

normal. The Ejection Fraction estimate is 60-65%. Doppler measurements suggest

pseudonormalized left ventricular relaxation, which is associated with grade

II/IV or mild to moderate diastolic dysfunction. Regional wall motion

assessment is limited by suboptimal windows however it appears normal.



Right Ventricle

The right ventricle is normal in size, thickness and function. The right

ventricular systolic function is normal.



Atria

The right atrium is normal. The left atrium is mildly dilated. The interatrial

septum is difficult to see, but appears to be grossly normal.



Mitral Valve

The mitral valve is grossly normal. There is a trace to mild amount of mitral

regurgitation.



Aortic Valve

The aortic valve is not well visualized secondary to technical limitations. No

aortic regurgitation is present.





Tricuspid Valve

The tricuspid valve is not well visualized secondary to technical limitations.

There is a trace to mild amount of tricuspid regurgitation. There is moderate

pulmonary hypertension by echo. Best estimated RVSP is approximately 50-55

mm/Hg.



Pulmonic Valve

The pulmonic valve is not well visualized. There is a mild amount of pulmonic

regurgitation.



Effusions

There is no pericardial effusion. There is no pleural effusion.





_______________________________________________________________________________

_______________________________________________________________________________

Electronically signed by:      Dre Nickerson      on 2020 08:48 PM



CC: FREDDIE BELTRAN Antonio

## 2020-08-31 NOTE — PDOC CRITICAL CARE PROG REPORT
General


Date:: 08/31/20


ICU Day:: 1


Hospital Day:: 1


Resuscitation Status: Full Code


Events in the past 12 to 24 Hours:: 





Both sodium higher and potassium lower.


Review of systems relevant to events:: 





Renal, hepatic and neurological


Reason for ICU Addmission:: Both sodium and potassium improved.





- Medications:


Medications reviewed and adjusted accordingly: Yes


Vasopressors:: 





None


Sedation:: 





None





Physical Exam


Vital Signs: 


                                        











Temp Pulse Resp BP Pulse Ox


 


 98.2 F   72   12   142/65 H  95 


 


 08/31/20 10:00  08/31/20 08:40  08/31/20 10:27  08/31/20 10:27  08/31/20 10:27








                                 Intake & Output











 08/30/20 08/31/20 09/01/20





 06:59 06:59 06:59


 


Intake Total 500 1200 1077


 


Output Total  1500 640


 


Balance 500 -300 437


 


Weight 88.5 kg 142.1 kg 








                                  Weight/Height





Weight                           142.1 kg


Height                           5 ft 9 in








General appearance: PRESENT: no acute distress, cooperative, obese


Head exam: PRESENT: atraumatic, normocephalic


Eye exam: PRESENT: conjunctiva pink, EOMI, PERRLA.  ABSENT: scleral icterus


Ear exam: PRESENT: normal external ear exam


Mouth exam: PRESENT: moist, tongue midline


Neck exam: ABSENT: carotid bruit, JVD, lymphadenopathy, thyromegaly


Respiratory exam: PRESENT: clear to auscultation karma, decreased breath sounds.  

ABSENT: rales, rhonchi, wheezes


Cardiovascular exam: PRESENT: RRR.  ABSENT: diastolic murmur, rubs, systolic 

murmur


GI/Abdominal exam: PRESENT: normal bowel sounds, soft.  ABSENT: distended, 

guarding, mass, organolmegaly, rebound, tenderness


Rectal exam: PRESENT: deferred


Gentrourinary exam: PRESENT: indwelling catheter


Extremities exam: PRESENT: full ROM.  ABSENT: calf tenderness, clubbing, pedal 

edema


Musculoskeletal exam: PRESENT: normal inspection


Neurological exam: PRESENT: alert, altered, awake, oriented to person


Psychiatric exam: PRESENT: flat affect


Skin exam: PRESENT: petechiae, other - Ecchymoses from falling.





Laboratory/Radiographs


Laboratory Results: 


                                        





                                 08/31/20 06:05 





                                 08/31/20 06:05 





                                        











  08/30/20 08/30/20 08/30/20





  11:26 17:25 19:09


 


WBC   


 


RBC   


 


Hgb   


 


Hct   


 


MCV   


 


MCH   


 


MCHC   


 


RDW   


 


Plt Count   


 


Seg Neutrophils %   


 


Carbonic Acid    1.14


 


HCO3/H2CO3 Ratio    17:1


 


ABG pH    7.34 L


 


ABG pCO2    37.8


 


ABG pO2    97.5


 


ABG HCO3    20.1


 


ABG O2 Saturation    97.1


 


ABG Base Excess    -5.1


 


FiO2    3 L


 


Sodium  117.2 L*  116.4 L* 


 


Potassium  6.7 H*  7.5 H* 


 


Chloride  86 L  83 L 


 


Carbon Dioxide  22  23 


 


Anion Gap  9  10 


 


BUN  34 H  37 H 


 


Creatinine  2.11 H  2.41 H 


 


Est GFR ( Amer)  39 L  33 L 


 


Glucose  260 H  145 H 


 


Lactic Acid   


 


Calcium  8.7  9.3 


 


Phosphorus   


 


Magnesium   


 


Total Bilirubin   


 


AST   


 


Alkaline Phosphatase   


 


Total Protein   


 


Albumin   


 


Triglycerides   


 


Cholesterol   


 


LDL Cholesterol Direct   


 


VLDL Cholesterol   


 


HDL Cholesterol   


 


TSH   


 


Free T4   














  08/30/20 08/30/20 08/31/20





  22:02 22:02 06:05


 


WBC    10.2


 


RBC    2.64 L


 


Hgb    9.2 L


 


Hct    25.6 L


 


MCV    97


 


MCH    35.0 H


 


MCHC    36.1 H


 


RDW    15.7 H


 


Plt Count    71 L


 


Seg Neutrophils %    76.0


 


Carbonic Acid   


 


HCO3/H2CO3 Ratio   


 


ABG pH   


 


ABG pCO2   


 


ABG pO2   


 


ABG HCO3   


 


ABG O2 Saturation   


 


ABG Base Excess   


 


FiO2   


 


Sodium  116.2 L*  


 


Potassium  6.6 H*  


 


Chloride  86 L  


 


Carbon Dioxide  22  


 


Anion Gap  8  


 


BUN  36 H  


 


Creatinine  2.39 H  


 


Est GFR ( Amer)  34 L  


 


Glucose  105  


 


Lactic Acid   1.5 


 


Calcium  9.2  


 


Phosphorus   


 


Magnesium  2.0  


 


Total Bilirubin  2.0 H  


 


AST  84 H  


 


Alkaline Phosphatase  136 H  


 


Total Protein  7.1  


 


Albumin  3.6  


 


Triglycerides   


 


Cholesterol   


 


LDL Cholesterol Direct   


 


VLDL Cholesterol   


 


HDL Cholesterol   


 


TSH   


 


Free T4   














  08/31/20 08/31/20





  06:05 06:05


 


WBC  


 


RBC  


 


Hgb  


 


Hct  


 


MCV  


 


MCH  


 


MCHC  


 


RDW  


 


Plt Count  


 


Seg Neutrophils %  


 


Carbonic Acid  


 


HCO3/H2CO3 Ratio  


 


ABG pH  


 


ABG pCO2  


 


ABG pO2  


 


ABG HCO3  


 


ABG O2 Saturation  


 


ABG Base Excess  


 


FiO2  


 


Sodium  119.9 L* 


 


Potassium  6.4 H* 


 


Chloride  86 L 


 


Carbon Dioxide  22 


 


Anion Gap  12 


 


BUN  38 H 


 


Creatinine  2.24 H 


 


Est GFR (African Amer)  36 L 


 


Glucose  94 


 


Lactic Acid  


 


Calcium  9.2 


 


Phosphorus  5.4 H 


 


Magnesium  1.9 


 


Total Bilirubin  2.1 H 


 


AST  100 H 


 


Alkaline Phosphatase  112 


 


Total Protein  6.6 


 


Albumin  3.7 


 


Triglycerides  105 


 


Cholesterol  124.27 


 


LDL Cholesterol Direct  62 


 


VLDL Cholesterol  21.0 


 


HDL Cholesterol  42 


 


TSH   9.67 H


 


Free T4   1.47








                                        











  08/30/20 08/30/20 08/30/20





  00:23 00:23 03:37


 


Creatine Kinase  195 H   184 H


 


CK-MB (CK-2)   4.09 


 


Troponin I   < 0.012 


 


NT-Pro-B Natriuret Pep   














  08/31/20





  06:05


 


Creatine Kinase 


 


CK-MB (CK-2) 


 


Troponin I 


 


NT-Pro-B Natriuret Pep  1540 H











Impressions: 


                                        





Lumbar Spine X-Ray  08/30/20 00:05


IMPRESSION:


No acute bony injury is identified.


 








Chest X-Ray  08/30/20 14:47


IMPRESSION:  Hypoventilated exam without evidence of acute pulmonary process.


 











EKG: 





Currently NSR


All labs, radiographs, diagnostic studies and EKGs were personally reviewed: Yes


In addition, reports of radiographic and diagnostic studies were read: Yes





Assessment and Plan





- Diagnosis


(1) Hyponatremia


Is this a current diagnosis for this admission?: Yes   


Plan: 


His level of 119 without 3% NS is improved. Lasix drip stopped, aldactone 

stopped. Repeat BMP at 1PM. Stable for [facility] transfer.








(2) Hyperammonemia


Is this a current diagnosis for this admission?: Yes   


Plan: 


His level of 80 has nothing to compare to. However it is likely contributing to 

is flat affect.








(3) Hyperkalemia


Is this a current diagnosis for this admission?: Yes   


Plan: 


Potassium improved.








(4) Renal insufficiency


Is this a current diagnosis for this admission?: Yes   


Plan: 


We have nothing to copare his Cr and GFR to. However this would make sense with 

regard to his potassium level.





Plan Summary: 





Will recheck BMP at 1 PM and downgrade to [facility]. PT to mobilize.





Critical Time


Critical Time (minutes): 30


Level of Care: IMCU


Anticipated discharge: Home with Homehealth


Anticipated DC Timeframe: Other


-: 


1.  The care of a critical patient is a dynamic process.  This note is a 

representative synopsis but static in nature.  The timeframe for treatments 

given in order is not necessarily the actual time these treatments may have been

done.





2.  This patient requires critical care secondary to ongoing requirements for 

therapy not offered or safe outside the critical care environment.  Transfer to 

a lower level of care will result in altered life or limb morbidity and 

mortality.





3.  Multidisciplinary rounds completed.





4.  ABCDE bundle addressed.

## 2020-09-01 LAB
ANION GAP SERPL CALC-SCNC: 9 MMOL/L (ref 5–19)
ANION GAP SERPL CALC-SCNC: 9 MMOL/L (ref 5–19)
BUN SERPL-MCNC: 37 MG/DL (ref 7–20)
BUN SERPL-MCNC: 38 MG/DL (ref 7–20)
CALCIUM: 8.9 MG/DL (ref 8.4–10.2)
CALCIUM: 8.9 MG/DL (ref 8.4–10.2)
CHLORIDE SERPL-SCNC: 91 MMOL/L (ref 98–107)
CHLORIDE SERPL-SCNC: 94 MMOL/L (ref 98–107)
CO2 SERPL-SCNC: 23 MMOL/L (ref 22–30)
CO2 SERPL-SCNC: 24 MMOL/L (ref 22–30)
GLUCOSE SERPL-MCNC: 107 MG/DL (ref 75–110)
GLUCOSE SERPL-MCNC: 99 MG/DL (ref 75–110)
POTASSIUM SERPL-SCNC: 4.7 MMOL/L (ref 3.6–5)
POTASSIUM SERPL-SCNC: 6 MMOL/L (ref 3.6–5)

## 2020-09-01 RX ADMIN — SODIUM POLYSTYRENE SULFONATE SCH GM: 15 SUSPENSION ORAL; RECTAL at 12:26

## 2020-09-01 RX ADMIN — SODIUM CHLORIDE TAB 1 GM SCH GM: 1 TAB at 10:10

## 2020-09-01 RX ADMIN — Medication SCH: at 14:35

## 2020-09-01 RX ADMIN — COLCHICINE SCH MG: 0.6 TABLET, FILM COATED ORAL at 10:10

## 2020-09-01 RX ADMIN — GABAPENTIN SCH MG: 400 CAPSULE ORAL at 21:32

## 2020-09-01 RX ADMIN — RIFAXIMIN SCH MG: 550 TABLET ORAL at 21:32

## 2020-09-01 RX ADMIN — Medication SCH: at 05:26

## 2020-09-01 RX ADMIN — PANTOPRAZOLE SODIUM SCH MG: 40 TABLET, DELAYED RELEASE ORAL at 10:10

## 2020-09-01 RX ADMIN — SODIUM POLYSTYRENE SULFONATE SCH GM: 15 SUSPENSION ORAL; RECTAL at 10:21

## 2020-09-01 RX ADMIN — LACTULOSE SCH GM: 20 SOLUTION ORAL at 17:41

## 2020-09-01 RX ADMIN — FUROSEMIDE SCH MG: 20 TABLET ORAL at 10:09

## 2020-09-01 RX ADMIN — CARVEDILOL SCH MG: 12.5 TABLET, FILM COATED ORAL at 21:31

## 2020-09-01 RX ADMIN — SODIUM POLYSTYRENE SULFONATE SCH GM: 15 SUSPENSION ORAL; RECTAL at 23:07

## 2020-09-01 RX ADMIN — DEMECLOCYCLINE HYDROCHLORIDE SCH MG: 150 TABLET ORAL at 10:10

## 2020-09-01 RX ADMIN — IPRATROPIUM BROMIDE AND ALBUTEROL SULFATE SCH ML: 2.5; .5 SOLUTION RESPIRATORY (INHALATION) at 08:34

## 2020-09-01 RX ADMIN — SODIUM POLYSTYRENE SULFONATE SCH GM: 15 SUSPENSION ORAL; RECTAL at 17:43

## 2020-09-01 RX ADMIN — LACTULOSE SCH GM: 20 SOLUTION ORAL at 10:09

## 2020-09-01 RX ADMIN — INSULIN HUMAN SCH: 100 INJECTION, SOLUTION PARENTERAL at 08:03

## 2020-09-01 RX ADMIN — DEMECLOCYCLINE HYDROCHLORIDE SCH MG: 150 TABLET ORAL at 17:42

## 2020-09-01 RX ADMIN — GABAPENTIN SCH MG: 400 CAPSULE ORAL at 05:26

## 2020-09-01 RX ADMIN — GABAPENTIN SCH MG: 400 CAPSULE ORAL at 15:03

## 2020-09-01 RX ADMIN — DOCUSATE SODIUM SCH MG: 100 CAPSULE, LIQUID FILLED ORAL at 10:09

## 2020-09-01 RX ADMIN — SODIUM CHLORIDE PRN MLS/HR: 9 INJECTION, SOLUTION INTRAVENOUS at 21:40

## 2020-09-01 RX ADMIN — IPRATROPIUM BROMIDE AND ALBUTEROL SULFATE SCH ML: 2.5; .5 SOLUTION RESPIRATORY (INHALATION) at 12:39

## 2020-09-01 RX ADMIN — DOCUSATE SODIUM SCH MG: 100 CAPSULE, LIQUID FILLED ORAL at 17:41

## 2020-09-01 RX ADMIN — COLCHICINE SCH MG: 0.6 TABLET, FILM COATED ORAL at 17:42

## 2020-09-01 RX ADMIN — IPRATROPIUM BROMIDE AND ALBUTEROL SULFATE SCH: 2.5; .5 SOLUTION RESPIRATORY (INHALATION) at 15:44

## 2020-09-01 RX ADMIN — SODIUM CHLORIDE PRN MLS/HR: 9 INJECTION, SOLUTION INTRAVENOUS at 06:17

## 2020-09-01 RX ADMIN — FUROSEMIDE SCH MG: 20 TABLET ORAL at 17:42

## 2020-09-01 RX ADMIN — CARVEDILOL SCH MG: 12.5 TABLET, FILM COATED ORAL at 10:09

## 2020-09-01 RX ADMIN — Medication SCH: at 21:40

## 2020-09-01 NOTE — PDOC CRITICAL CARE PROG REPORT
General


Date:: 09/01/20


Hospital Day:: 2


Resuscitation Status: Full Code


Events in the past 12 to 24 Hours:: 





Cr and GFR better, potassium a bit higher.


Review of systems relevant to events:: 





Renal


Reason for ICU Addmission:: Both sodium improved.





- Medications:


Medications reviewed and adjusted accordingly: Yes


Vasopressors:: 





None


Sedation:: 





None





Physical Exam


Vital Signs: 


                                        











Temp Pulse Resp BP Pulse Ox


 


 100.4 F   99   26 H  163/66 H  94 


 


 09/01/20 07:18  09/01/20 08:34  09/01/20 08:34  09/01/20 07:18  09/01/20 08:34








                                 Intake & Output











 08/31/20 09/01/20 09/02/20





 06:59 06:59 06:59


 


Intake Total 1200 1676 


 


Output Total 1500 3390 500


 


Balance -300 -1714 -500


 


Weight 142.1 kg 143.9 kg 








                                  Weight/Height





Weight                           143.9 kg


Height                           5 ft 9 in








General appearance: PRESENT: no acute distress, cooperative, morbidly obese


Head exam: PRESENT: atraumatic, normocephalic


Eye exam: PRESENT: conjunctiva pink, EOMI, PERRLA.  ABSENT: scleral icterus


Ear exam: PRESENT: normal external ear exam


Mouth exam: PRESENT: moist, tongue midline


Respiratory exam: PRESENT: clear to auscultation karma, decreased breath sounds.  

ABSENT: rales, rhonchi, wheezes


Cardiovascular exam: PRESENT: RRR.  ABSENT: diastolic murmur, rubs, systolic 

murmur


GI/Abdominal exam: PRESENT: normal bowel sounds, soft.  ABSENT: distended, 

guarding, mass, organolmegaly, rebound, tenderness


Rectal exam: PRESENT: deferred


Extremities exam: PRESENT: full ROM


Musculoskeletal exam: PRESENT: normal inspection


Neurological exam: PRESENT: alert, awake, oriented to person, CN II-XII grossly 

intact


Skin exam: PRESENT: dry, intact, warm.  ABSENT: cyanosis, rash





Laboratory/Radiographs


Laboratory Results: 


                                        





                                 08/31/20 06:05 





                                 09/01/20 03:43 





                                        











  08/31/20 09/01/20





  14:43 03:43


 


Sodium  121.3 L  123.7 L


 


Potassium  5.6 H  6.0 H*


 


Chloride  88 L  91 L


 


Carbon Dioxide  26  24


 


Anion Gap  7  9


 


BUN  39 H  38 H


 


Creatinine  2.08 H  1.87 H


 


Est GFR ( Amer)  40 L  45 L


 


Glucose  109  107


 


Calcium  8.9  8.9








                                        











  08/30/20 08/30/20 08/30/20





  00:23 00:23 03:37


 


Creatine Kinase  195 H   184 H


 


CK-MB (CK-2)   4.09 


 


Troponin I   < 0.012 


 


NT-Pro-B Natriuret Pep   














  08/31/20





  06:05


 


Creatine Kinase 


 


CK-MB (CK-2) 


 


Troponin I 


 


NT-Pro-B Natriuret Pep  1540 H











Impressions: 


                                        





Lumbar Spine X-Ray  08/30/20 00:05


IMPRESSION:


No acute bony injury is identified.


 








Chest X-Ray  08/30/20 14:47


IMPRESSION:  Hypoventilated exam without evidence of acute pulmonary process.


 











All labs, radiographs, diagnostic studies and EKGs were personally reviewed: Yes


In addition, reports of radiographic and diagnostic studies were read: Yes





Assessment and Plan





- Diagnosis


(1) Hyponatremia


Is this a current diagnosis for this admission?: Yes   


Plan: 


Improved with a level of 123.








(2) Hyperammonemia


Is this a current diagnosis for this admission?: Yes   


Plan: 


Will check again in AM








(3) Hyperkalemia


Is this a current diagnosis for this admission?: Yes   


Plan: 


K at 6.o more kayexalaete ordered and repeat BMP at 2P.








(4) Renal insufficiency


Is this a current diagnosis for this admission?: Yes   


Plan: 


Improved





Plan Summary: 





Still stable for floor transfer.





Critical Time


Critical Time (minutes): 25


Level of Care: IMCU


Anticipated discharge: Home with Homehealth


Anticipated DC Timeframe: Other


-: 


1.  The care of a critical patient is a dynamic process.  This note is a 

representative synopsis but static in nature.  The timeframe for treatments 

given in order is not necessarily the actual time these treatments may have been

done.





2.  This patient requires critical care secondary to ongoing requirements for 

therapy not offered or safe outside the critical care environment.  Transfer to 

a lower level of care will result in altered life or limb morbidity and 

mortality.





3.  Multidisciplinary rounds completed.





4.  ABCDE bundle addressed.

## 2020-09-02 LAB
ADD MANUAL DIFF: NO
ALBUMIN SERPL-MCNC: 3 G/DL (ref 3.5–5)
ALP SERPL-CCNC: 119 U/L (ref 38–126)
ANION GAP SERPL CALC-SCNC: 10 MMOL/L (ref 5–19)
AST SERPL-CCNC: 89 U/L (ref 17–59)
BASOPHILS # BLD AUTO: 0 10^3/UL (ref 0–0.2)
BASOPHILS NFR BLD AUTO: 0.2 % (ref 0–2)
BILIRUB DIRECT SERPL-MCNC: 1.2 MG/DL (ref 0–0.4)
BILIRUB SERPL-MCNC: 1.9 MG/DL (ref 0.2–1.3)
BUN SERPL-MCNC: 34 MG/DL (ref 7–20)
CALCIUM: 8.6 MG/DL (ref 8.4–10.2)
CHLORIDE SERPL-SCNC: 93 MMOL/L (ref 98–107)
CO2 SERPL-SCNC: 24 MMOL/L (ref 22–30)
EOSINOPHIL # BLD AUTO: 0.1 10^3/UL (ref 0–0.6)
EOSINOPHIL NFR BLD AUTO: 0.8 % (ref 0–6)
ERYTHROCYTE [DISTWIDTH] IN BLOOD BY AUTOMATED COUNT: 15.6 % (ref 11.5–14)
GLUCOSE SERPL-MCNC: 95 MG/DL (ref 75–110)
HCT VFR BLD CALC: 25.5 % (ref 37.9–51)
HGB BLD-MCNC: 8.9 G/DL (ref 13.5–17)
LYMPHOCYTES # BLD AUTO: 0.8 10^3/UL (ref 0.5–4.7)
LYMPHOCYTES NFR BLD AUTO: 5.4 % (ref 13–45)
MCH RBC QN AUTO: 34 PG (ref 27–33.4)
MCHC RBC AUTO-ENTMCNC: 34.8 G/DL (ref 32–36)
MCV RBC AUTO: 98 FL (ref 80–97)
MONOCYTES # BLD AUTO: 1.7 10^3/UL (ref 0.1–1.4)
MONOCYTES NFR BLD AUTO: 10.9 % (ref 3–13)
NEUTROPHILS # BLD AUTO: 12.8 10^3/UL (ref 1.7–8.2)
NEUTS SEG NFR BLD AUTO: 82.7 % (ref 42–78)
PLATELET # BLD: 60 10^3/UL (ref 150–450)
POTASSIUM SERPL-SCNC: 4.1 MMOL/L (ref 3.6–5)
PROT SERPL-MCNC: 5.9 G/DL (ref 6.3–8.2)
RBC # BLD AUTO: 2.61 10^6/UL (ref 4.35–5.55)
TOTAL CELLS COUNTED % (AUTO): 100 %
WBC # BLD AUTO: 15.5 10^3/UL (ref 4–10.5)

## 2020-09-02 RX ADMIN — SODIUM POLYSTYRENE SULFONATE SCH GM: 15 SUSPENSION ORAL; RECTAL at 05:20

## 2020-09-02 RX ADMIN — GABAPENTIN SCH MG: 400 CAPSULE ORAL at 13:41

## 2020-09-02 RX ADMIN — DEMECLOCYCLINE HYDROCHLORIDE SCH MG: 150 TABLET ORAL at 18:12

## 2020-09-02 RX ADMIN — DEMECLOCYCLINE HYDROCHLORIDE SCH MG: 150 TABLET ORAL at 09:18

## 2020-09-02 RX ADMIN — DOCUSATE SODIUM SCH: 100 CAPSULE, LIQUID FILLED ORAL at 18:03

## 2020-09-02 RX ADMIN — SODIUM CHLORIDE TAB 1 GM SCH GM: 1 TAB at 09:18

## 2020-09-02 RX ADMIN — CARVEDILOL SCH MG: 12.5 TABLET, FILM COATED ORAL at 09:18

## 2020-09-02 RX ADMIN — Medication SCH ML: at 05:21

## 2020-09-02 RX ADMIN — FUROSEMIDE SCH MG: 20 TABLET ORAL at 18:10

## 2020-09-02 RX ADMIN — GABAPENTIN SCH MG: 400 CAPSULE ORAL at 21:52

## 2020-09-02 RX ADMIN — ACETAMINOPHEN PRN MG: 325 TABLET ORAL at 21:52

## 2020-09-02 RX ADMIN — PANTOPRAZOLE SODIUM SCH MG: 40 TABLET, DELAYED RELEASE ORAL at 09:18

## 2020-09-02 RX ADMIN — SODIUM CHLORIDE PRN MLS/HR: 9 INJECTION, SOLUTION INTRAVENOUS at 18:09

## 2020-09-02 RX ADMIN — LACTULOSE SCH: 20 SOLUTION ORAL at 09:13

## 2020-09-02 RX ADMIN — COLCHICINE SCH MG: 0.6 TABLET, FILM COATED ORAL at 09:18

## 2020-09-02 RX ADMIN — MAGNESIUM SULFATE IN DEXTROSE SCH MLS/HR: 10 INJECTION, SOLUTION INTRAVENOUS at 21:49

## 2020-09-02 RX ADMIN — RIFAXIMIN SCH MG: 550 TABLET ORAL at 21:52

## 2020-09-02 RX ADMIN — FUROSEMIDE SCH MG: 20 TABLET ORAL at 09:18

## 2020-09-02 RX ADMIN — DOCUSATE SODIUM SCH: 100 CAPSULE, LIQUID FILLED ORAL at 09:14

## 2020-09-02 RX ADMIN — MAGNESIUM SULFATE IN DEXTROSE SCH MLS/HR: 10 INJECTION, SOLUTION INTRAVENOUS at 19:31

## 2020-09-02 RX ADMIN — GABAPENTIN SCH MG: 400 CAPSULE ORAL at 05:20

## 2020-09-02 RX ADMIN — MAGNESIUM SULFATE IN DEXTROSE SCH MLS/HR: 10 INJECTION, SOLUTION INTRAVENOUS at 18:10

## 2020-09-02 RX ADMIN — Medication SCH: at 13:37

## 2020-09-02 RX ADMIN — Medication SCH ML: at 22:16

## 2020-09-02 RX ADMIN — INSULIN HUMAN SCH: 100 INJECTION, SOLUTION PARENTERAL at 09:09

## 2020-09-02 RX ADMIN — CARVEDILOL SCH MG: 12.5 TABLET, FILM COATED ORAL at 21:53

## 2020-09-02 NOTE — PDOC PROGRESS REPORT
Subjective


Progress Note for:: 09/02/20


Subjective:: 





Feeling uncomfortable.  Complaining of back pain.  Feels weak in general.  Still

having diarrhea but the fecal collection system does not seem to be working 

well.


Reason For Visit: 


HYPERKALEMIA,HYPONATREMIA








Physical Exam


Vital Signs: 


                                        











Temp Pulse Resp BP Pulse Ox


 


 99.0 F   76   18   96/42 L  97 


 


 09/02/20 11:48  09/02/20 14:00  09/02/20 11:48  09/02/20 11:48  09/02/20 11:48








                                 Intake & Output











 09/01/20 09/02/20 09/03/20





 06:59 06:59 06:59


 


Intake Total 1676 2219 1355


 


Output Total 3390 3750 1000


 


Balance -1714 -1531 355


 


Weight 143.9 kg 137.9 kg 











General appearance: PRESENT: cooperative, mild distress - Mild to moderate dis

tress, morbidly obese, well-developed


Head exam: PRESENT: atraumatic, normocephalic


Eye exam: PRESENT: other - Right eye


Ear exam: PRESENT: normal external ear exam.  ABSENT: bleeding, drainage


Respiratory exam: PRESENT: decreased breath sounds - Likely due to body habitus,

symmetrical, unlabored.  ABSENT: rales, rhonchi, tachypnea, wheezes


Cardiovascular exam: PRESENT: RRR, +S1, +S2, systolic murmur


GI/Abdominal exam: PRESENT: normal bowel sounds, soft, other - Protuberant 

abdomen.  ABSENT: tenderness


Rectal exam: PRESENT: other - Fecal collection system in place


Extremities exam: PRESENT: pedal edema


Neurological exam: PRESENT: alert, awake, oriented to person, oriented to place,

oriented to time, oriented to situation


Psychiatric exam: PRESENT: appropriate affect - Affect reflects his discomfort. 

ABSENT: agitated, anxious





Results


Laboratory Results: 


                                        





                                 09/02/20 05:46 





                                 09/02/20 05:46 





                                        











  09/02/20 09/02/20





  05:46 05:46


 


WBC  15.5 H 


 


RBC  2.61 L 


 


Hgb  8.9 L 


 


Hct  25.5 L 


 


MCV  98 H 


 


MCH  34.0 H 


 


MCHC  34.8 


 


RDW  15.6 H 


 


Plt Count  60 L 


 


Seg Neutrophils %  82.7 H 


 


Sodium   126.6 L


 


Potassium   4.1


 


Chloride   93 L


 


Carbon Dioxide   24


 


Anion Gap   10


 


BUN   34 H


 


Creatinine   1.69 H


 


Est GFR (African Amer)   50 L


 


Glucose   95


 


Calcium   8.6


 


Magnesium   1.4 L


 


Total Bilirubin   1.9 H


 


AST   89 H


 


Alkaline Phosphatase   119


 


Total Protein   5.9 L


 


Albumin   3.0 L








                                        











  08/30/20 08/30/20 08/30/20





  00:23 00:23 03:37


 


Creatine Kinase  195 H   184 H


 


CK-MB (CK-2)   4.09 


 


Troponin I   < 0.012 


 


NT-Pro-B Natriuret Pep   














  08/31/20





  06:05


 


Creatine Kinase 


 


CK-MB (CK-2) 


 


Troponin I 


 


NT-Pro-B Natriuret Pep  1540 H











Impressions: 


                                        





Lumbar Spine X-Ray  08/30/20 00:05


IMPRESSION:


No acute bony injury is identified.


 








Chest X-Ray  09/02/20 00:00


IMPRESSION:  Cardiomegaly and low inspiratory lung volumes without a 

superimposed acute cardiopulmonary process.


 














Assessment and Plan





- Diagnosis


(1) Hyponatremia


Is this a current diagnosis for this admission?: Yes   


Plan: 


This is a chronic issue for this patient.  Sodium is up to 126 today.  Continue 

to monitor sodium levels.


Consider discontinuing demeclocycline and adding a fluid restriction.








(2) Leucocytosis


Qualifiers: 


   Leukocytosis type: unspecified   Qualified Code(s): D72.829 - Elevated white 

blood cell count, unspecified   


Is this a current diagnosis for this admission?: Yes   


Plan: 


White count was up to 15,000 today.  It was normal yesterday.  No obvious site 

of infection.  Repeat tomorrow.








(3) Thrombocytopenia


Is this a current diagnosis for this admission?: Yes   


Plan: 


Platelets are low.  It is unknown if this is chronic or an acute issue.  This 

could account for the bruising that is present.  Continue to monitor platelet 

count.








(4) Anemia


Qualifiers: 


   Anemia type: unspecified type   Qualified Code(s): D64.9 - Anemia, uns

pecified   


Is this a current diagnosis for this admission?: Yes   


Plan: 


Hemoglobin is down to 8.9.  Continue to monitor.  MCV is borderline high.  We 

will add anemia studies to today's blood work








(5) Hyperammonemia


Is this a current diagnosis for this admission?: Yes   


Plan: 


Was on lactulose.  This was discontinued.  Will recheck.  If still elevated 

consider resuming lactulose.








(6) Hyperkalemia


Is this a current diagnosis for this admission?: Yes   


Plan: 


Calcium is normal at this point.  Continue to monitor electrolytes.








(7) Hypomagnesemia


Is this a current diagnosis for this admission?: Yes   


Plan: 


Magnesium supplement ordered.  Recheck magnesium level tomorrow.








- Time


Time Spent with patient: 15-24 minutes


Medications reviewed and adjusted accordingly: Yes


Anticipated Discharge Disposition: Home with Home Health


Anticipated Discharge Timeframe: within 72 hours

## 2020-09-02 NOTE — RADIOLOGY REPORT (SQ)
EXAM DESCRIPTION:  CHEST SINGLE VIEW



IMAGES COMPLETED DATE/TIME:  9/2/2020 9:33 am



REASON FOR STUDY:  Assess for pneumonia



COMPARISON:  AP view of the chest from 8/30/2020.



EXAM PARAMETERS:  NUMBER OF VIEWS: One view.

TECHNIQUE:  An AP view of the chest was obtained.

RADIATION DOSE: NA

LIMITATIONS: None.



FINDINGS:  LUNGS AND PLEURA: Low inspiratory lung volumes without a superimposed consolidation, sizea
ble pleural effusion or pneumothorax.

MEDIASTINUM AND HILAR STRUCTURES: No mediastinal or hilar contour abnormality.

HEART AND VASCULAR STRUCTURES: Stable enlarged cardiac silhouette.

BONES: No acute findings.

HARDWARE: None in the chest.

OTHER: No other finding.



IMPRESSION:  Cardiomegaly and low inspiratory lung volumes without a superimposed acute cardiopulmona
ry process.



TECHNICAL DOCUMENTATION:  JOB ID:  [id number]

 2011 [employer]- All Rights Reserved



Reading location - IP/workstation name: [email or web address]

## 2020-09-02 NOTE — CDI QUERY
CDI Query


CDI Review: 





We are seeking further clarification of documentation to reflect the severity of

illness of your patient.





Documentation in the medical record indicates that this patient has been 

admitted with or diagnosed as having  renal insufficiency





The following labs are also documented in the medical record: 





 Creatinine: 2.24  2.15  1.87  2.02  1.69





 Urine osmolality 297





Per H&P: Patient has massive peripheral edema





On large dose Spironolactone and Lasix





Based on your medical judgment, can you please clarify in the progress notes the

diagnosis associated with these findings such as:





   Acute Kidney Injury 


   Acute Tubular Necrosis


   Acute on Chronic Renal Failure 


   Chronic Kidney Disease (Stage, if known)


   Other (please specify)


   None of the above / Not applicable





Thank you for your consideration.





BALTA Blandon RN


Clinical 


Physician Advisor


[phone]


[email or web address]

## 2020-09-03 LAB
ABSOLUTE LYMPHOCYTES# (MANUAL): 0.6 10^3/UL (ref 0.5–4.7)
ABSOLUTE MONOCYTES # (MANUAL): 0.3 10^3/UL (ref 0.1–1.4)
ABSOLUTE RETICS #: 0.06 10^6/UL (ref 0.03–0.12)
ADD MANUAL DIFF: YES
ALBUMIN SERPL-MCNC: 2.7 G/DL (ref 3.5–5)
ALP SERPL-CCNC: 117 U/L (ref 38–126)
ANION GAP SERPL CALC-SCNC: 9 MMOL/L (ref 5–19)
ANISOCYTOSIS BLD QL SMEAR: (no result)
AST SERPL-CCNC: 80 U/L (ref 17–59)
BASO STIPL BLD QL SMEAR: PRESENT
BASOPHILS NFR BLD MANUAL: 0 % (ref 0–2)
BILIRUB DIRECT SERPL-MCNC: 1.2 MG/DL (ref 0–0.4)
BILIRUB SERPL-MCNC: 1.8 MG/DL (ref 0.2–1.3)
BUN SERPL-MCNC: 36 MG/DL (ref 7–20)
CALCIUM: 8 MG/DL (ref 8.4–10.2)
CHLORIDE SERPL-SCNC: 90 MMOL/L (ref 98–107)
CO2 SERPL-SCNC: 24 MMOL/L (ref 22–30)
DACRYOCYTES BLD QL SMEAR: SLIGHT
EOSINOPHIL NFR BLD MANUAL: 0 % (ref 0–6)
ERYTHROCYTE [DISTWIDTH] IN BLOOD BY AUTOMATED COUNT: 15.9 % (ref 11.5–14)
FERRITIN SERPL-MCNC: 128 NG/ML (ref 17.9–464)
FOLATE SERPL-MCNC: 18.1 NG/ML (ref 2.76–?)
GLUCOSE SERPL-MCNC: 104 MG/DL (ref 75–110)
HCT VFR BLD CALC: 23.4 % (ref 37.9–51)
HGB BLD-MCNC: 8.4 G/DL (ref 13.5–17)
IRON(TIBC): < 10.1 UG/DL (ref 49–181)
MACROCYTES BLD QL SMEAR: SLIGHT
MCH RBC QN AUTO: 34.9 PG (ref 27–33.4)
MCHC RBC AUTO-ENTMCNC: 35.8 G/DL (ref 32–36)
MCV RBC AUTO: 98 FL (ref 80–97)
METAMYELOCYTES % (MANUAL): 3 % (ref 0–1)
MONOCYTES % (MANUAL): 3 % (ref 3–13)
NEUTS BAND NFR BLD MANUAL: 5 % (ref 3–5)
PLATELET # BLD: 51 10^3/UL (ref 150–450)
PLATELET COMMENT: (no result)
POIKILOCYTOSIS BLD QL SMEAR: SLIGHT
POLYCHROMASIA BLD QL SMEAR: SLIGHT
POTASSIUM SERPL-SCNC: 4.6 MMOL/L (ref 3.6–5)
PROMYELOCYTES % (MANUAL): 1 %
PROT SERPL-MCNC: 5.6 G/DL (ref 6.3–8.2)
RBC # BLD AUTO: 2.4 10^6/UL (ref 4.35–5.55)
RETICULOCYTE COUNT (AUTO): 2.71 % (ref 0.66–2.85)
SEGMENTED NEUTROPHILS % (MAN): 81 % (ref 42–78)
STOMATOCYTES BLD QL SMEAR: SLIGHT
TOTAL CELLS COUNTED BLD: 100
VARIANT LYMPHS NFR BLD MANUAL: 7 % (ref 13–45)
WBC # BLD AUTO: 9.2 10^3/UL (ref 4–10.5)

## 2020-09-03 RX ADMIN — FUROSEMIDE SCH MG: 20 TABLET ORAL at 09:20

## 2020-09-03 RX ADMIN — LIDOCAINE SCH PATCH: 50 PATCH CUTANEOUS at 10:08

## 2020-09-03 RX ADMIN — ACETAMINOPHEN PRN MG: 325 TABLET ORAL at 02:05

## 2020-09-03 RX ADMIN — FUROSEMIDE SCH MG: 20 TABLET ORAL at 18:08

## 2020-09-03 RX ADMIN — COLCHICINE SCH MG: 0.6 TABLET, FILM COATED ORAL at 09:19

## 2020-09-03 RX ADMIN — SODIUM CHLORIDE TAB 1 GM SCH GM: 1 TAB at 18:09

## 2020-09-03 RX ADMIN — DOCUSATE SODIUM SCH MG: 100 CAPSULE, LIQUID FILLED ORAL at 18:08

## 2020-09-03 RX ADMIN — DOCUSATE SODIUM SCH MG: 100 CAPSULE, LIQUID FILLED ORAL at 09:20

## 2020-09-03 RX ADMIN — RIFAXIMIN SCH MG: 550 TABLET ORAL at 21:40

## 2020-09-03 RX ADMIN — FUROSEMIDE SCH MG: 20 TABLET ORAL at 13:45

## 2020-09-03 RX ADMIN — CYCLOBENZAPRINE HYDROCHLORIDE PRN MG: 10 TABLET, FILM COATED ORAL at 20:33

## 2020-09-03 RX ADMIN — PANTOPRAZOLE SODIUM SCH MG: 40 TABLET, DELAYED RELEASE ORAL at 09:19

## 2020-09-03 RX ADMIN — INSULIN HUMAN SCH: 100 INJECTION, SOLUTION PARENTERAL at 08:50

## 2020-09-03 RX ADMIN — GABAPENTIN SCH MG: 400 CAPSULE ORAL at 13:45

## 2020-09-03 RX ADMIN — Medication SCH ML: at 13:45

## 2020-09-03 RX ADMIN — ACETAMINOPHEN PRN MG: 325 TABLET ORAL at 06:30

## 2020-09-03 RX ADMIN — Medication SCH ML: at 05:19

## 2020-09-03 RX ADMIN — IPRATROPIUM BROMIDE AND ALBUTEROL SULFATE PRN ML: 2.5; .5 SOLUTION RESPIRATORY (INHALATION) at 20:54

## 2020-09-03 RX ADMIN — CYCLOBENZAPRINE HYDROCHLORIDE PRN MG: 10 TABLET, FILM COATED ORAL at 10:10

## 2020-09-03 RX ADMIN — GABAPENTIN SCH MG: 400 CAPSULE ORAL at 21:40

## 2020-09-03 RX ADMIN — CARVEDILOL SCH MG: 12.5 TABLET, FILM COATED ORAL at 21:41

## 2020-09-03 RX ADMIN — Medication SCH ML: at 21:41

## 2020-09-03 RX ADMIN — GABAPENTIN SCH MG: 400 CAPSULE ORAL at 05:18

## 2020-09-03 RX ADMIN — SODIUM CHLORIDE PRN MLS/HR: 9 INJECTION, SOLUTION INTRAVENOUS at 19:01

## 2020-09-03 RX ADMIN — CARVEDILOL SCH MG: 12.5 TABLET, FILM COATED ORAL at 09:20

## 2020-09-03 NOTE — PDOC CONSULTATION
Consultation


Consult Date: 09/03/20


Provider Consulted: TRINY RICH


Consult reason:: Hyponatremia





History of Present Illness


Admission Date/PCP: 


  08/30/20 14:50





  





History of Present Illness: 


SULY VILLALPANDO is a 60 year old male with history of alcoholic liver disease, 

diastolic congestive heart failure, atrial fibrillation, and gout who was 

admitted on August 30 presenting with generalized weakness and obtundation.  

Initial work-up showed a sodium of 114, potassium of 7.6 and creatinine of 2.24.

 Initial serum osmolality was 252, urine osmolality of 297 and urine sodium of 

34.  Patient has severe bilateral lower extremity edema and scrotal swelling 

initial physical examination of admitting physician.  And spironolactone 100 mg 

every 12 hours.  Patient was admitted in the ICU and was given salt tablets, 

demeclocycline and initial IV Lasix drip which seems to have been discontinued 

after 24 hours and was switched to oral low-dose Lasix.  The spironolactone was 

discontinued.  Sodium level level has improved to 126.6 yesterday but decrease 

again to 122.7 today.  For whatever reason he was given a liter of normal saline

bolus yesterday.  The hyperkalemia was successfully corrected currently 

resolved.  Patient's kidney function has improved from creatinine of 2.24 to 

current of 1.68.  He is passing adequate amount of urine output anywhere between

1400 to 2300 mL for the past 24 to 48 hours.





Today I tried to talk to patient to get a little bit more history but he is not 

a very good historian.  He relates that he just moved here in [place] from 

[facility] about 3 weeks ago.  He seems to be just following up with a 

regular primary care doctor but mentioned that he has a liver specialist in 

[place].  He states that he was told that he has end-stage liver disease 

secondary to alcohol.  He quit alcohol in 2016.  He moved down here after his 

fiance passed away this year to live with his sister in [place].  He denies 

any problem with hyponatremia previously that he is aware of her know about.  He

is also not sure about problems and does not know his baseline.  He said he 

drinks a gallon of water daily.  He admits having this leg swelling for a while 

but unable to tell me how long.  He was having some diarrhea possibly due to the

lactulose.  He states that his last bowel movement was this morning and it seems

to be formed.  He has some dry cough but denies any exposure to anybody with 

COVID.  He denies any chest pains, shortness of breath, nausea or vomiting.








Past Medical History


Cardiac Medical History: Reports: Atrial Fibrillation


EENT Medical History: Reports: Other - Had trauma to his eyes at 12 years old


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


Musculoskeltal Medical History: Reports: Arthritis, Gout





Past Surgical History


Past Surgical History: Reports: None





Social History


Information Source: Patient


Lives with: Family - Sister


Smoking Status: Never Smoker


Electronic Cigarette use?: No


Frequency of Alcohol Use: None


Hx Recreational Drug Use: No


Drugs: None


Hx Prescription Drug Abuse: No





- Advance Directive


Resuscitation Status: Full Code





Family History


Family History: CAD - Parents


Parental Family History Reviewed: Yes


Children Family History Reviewed: NA


Sibling(s) Family History Reviewed.: Yes





Medication/Allergy


Home Medications: 








Carvedilol [Coreg 12.5 mg Tablet] 12.5 mg PO Q12 08/30/20 


Colchicine [Colcrys 0.6 mg Tablet] 1 tab PO BID 08/30/20 


Furosemide [Lasix 20 mg Tablet] 20 mg PO BID 08/30/20 


Gabapentin [Neurontin] 800 mg PO TID 08/30/20 


Multivitamin [Tab-A-Obed] 1 each PO DAILY 08/30/20 


Omeprazole 40 mg PO DAILY 08/30/20 


Ondansetron [Zofran Odt 4 mg Tablet] 4 mg PO BIDP PRN 08/30/20 


Rifaximin [Xifaxan 550 Mg Tablet] 550 mg PO QHS 08/30/20 


Spironolactone [Aldactone 100 mg Tablet] 1 tab PO Q12 08/30/20 


Turmeric/Turmeric Root Extract [Turmeric 500 mg Capsule] 1 each PO DAILY 

08/30/20 








Allergies/Adverse Reactions: 


                                        





No Known Allergies Allergy (Unverified 08/30/20 01:24)


   











Review of Systems


All systems: reviewed and no additional remarkable complaints except as stated


Review of Systems: 





Constitutional: ABSENT: chills, fatigue, fever(s), headache(s), weight gain, 

weight loss; weakness


Eyes: ABSENT: visual disturbances


Ears: ABSENT: hearing changes


Cardiovascular: ABSENT: chest pain, dyspnea on exertion,  orthropnea, 

palpitations; lower extremity edema


Respiratory: ABSENT: cough, dyspnea, hemoptysis


Gastrointestinal: ABSENT: abdominal pain, constipation,  hematemesis, 

hematochezia, nausea, vomiting; admits diarrhea


Genitourinary: ABSENT: dysuria, hematuria


Musculoskeletal: ABSENT: joint swelling


Integumentary: ABSENT: rash, wounds


Neurological: ABSENT: abnormal gait, abnormal speech, confusion, dizziness, 

focal weakness, numbness, syncope


Psychiatric: ABSENT: anxiety, depression


Endocrine: ABSENT: cold intolerance, heat intolerance, polydipsia, polyuria


Hematologic/Lymphatic: ABSENT: easy bleeding, easy bruising, lymphadenopathy





Physical Exam


Vital Signs: 


                                        











Temp Pulse Resp BP Pulse Ox


 


 97.9 F   74   24 H  101/49 L  96 


 


 09/03/20 07:39  09/03/20 07:39  09/03/20 07:39  09/03/20 07:39  09/03/20 07:39








                                 Intake & Output











 09/02/20 09/03/20 09/04/20





 06:59 06:59 06:59


 


Intake Total 2219 3463 


 


Output Total 3750 2200 


 


Balance -1531 1263 


 


Weight 137.9 kg 146.3 kg 











Exam: 





General appearance: No acute distress, cooperative, well-developed, well-

nourished


Head exam: PRESENT: atraumatic, normocephalic


Eye exam: PRESENT: Conjunctiva pale, EOMI, PERRLA.  Right prosthetic eye ABSENT:

conjunctival injection, scleral icterus


Mouth exam: PRESENT: moist, neck supple, tongue midline


Neck exam: PRESENT: full ROM.  ABSENT: carotid bruit, JVD, lymphadenopathy, 

thyromegaly


Respiratory exam: PRESENT: Diminished to auscultation bilaterally.  ABSENT: 

rales, rhonchi, stridor, wheezes


Cardiovascular exam: PRESENT: RRR, +S1, +S2.  ABSENT: systolic murmur


Pulses: PRESENT: normal radial pulses, normal dorsalis pedis pulses


GI/Abdominal exam: PRESENT: normal bowel sounds, soft.  Mild subcutaneous edema 

ABSENT: guarding, mass, tenderness


Rectal exam: Deferred


Extremities exam: PRESENT: full ROM.  Grade 3-4 bilateral lower extremity 

pitting edema up to the thighs and hips especially the dependent portions 

ABSENT: calf tenderness


Musculoskeletal: PRESENT: full ROM.  ABSENT: deformity


Neurological exam: PRESENT: alert, Awake, Oriented to person, Oriented to place,

Oriented to time, reflexes normal, CN II-XII grossly intact.  ABSENT: motor 

sensory deficit


Psychiatric exam: PRESENT: appropriate affect, normal mood.  ABSENT: homicidal 

ideation, suicidal ideation


Skin exam: PRESENT: intact, dry, warm.  ABSENT: rash





Results


Laboratory Results: 


                                        





                                 09/03/20 05:59 





                                 09/03/20 05:59 





                                        











  09/03/20 09/03/20 09/03/20





  05:59 05:59 10:35


 


WBC  9.2  


 


RBC  2.40 L  


 


Hgb  8.4 L  


 


Hct  23.4 L  


 


MCV  98 H  


 


MCH  34.9 H  


 


MCHC  35.8  


 


RDW  15.9 H  


 


Plt Count  51 L  


 


Seg Neutrophils %  Not Reportable  


 


Retic Count (auto)    2.71


 


Sodium   122.7 L 


 


Potassium   4.6 


 


Chloride   90 L 


 


Carbon Dioxide   24 


 


Anion Gap   9 


 


BUN   36 H 


 


Creatinine   1.68 H 


 


Est GFR ( Amer)   51 L 


 


Glucose   104 


 


Calcium   8.0 L 


 


Magnesium   1.8 


 


Total Bilirubin   1.8 H 


 


AST   80 H 


 


Alkaline Phosphatase   117 


 


Ammonia   


 


Total Protein   5.6 L 


 


Albumin   2.7 L 














  09/03/20





  10:35


 


WBC 


 


RBC 


 


Hgb 


 


Hct 


 


MCV 


 


MCH 


 


MCHC 


 


RDW 


 


Plt Count 


 


Seg Neutrophils % 


 


Retic Count (auto) 


 


Sodium 


 


Potassium 


 


Chloride 


 


Carbon Dioxide 


 


Anion Gap 


 


BUN 


 


Creatinine 


 


Est GFR (African Amer) 


 


Glucose 


 


Calcium 


 


Magnesium 


 


Total Bilirubin 


 


AST 


 


Alkaline Phosphatase 


 


Ammonia  52.5 H


 


Total Protein 


 


Albumin 








                                        











  08/30/20 08/30/20 08/30/20





  00:23 00:23 03:37


 


Creatine Kinase  195 H   184 H


 


CK-MB (CK-2)   4.09 


 


Troponin I   < 0.012 


 


NT-Pro-B Natriuret Pep   














  08/31/20





  06:05


 


Creatine Kinase 


 


CK-MB (CK-2) 


 


Troponin I 


 


NT-Pro-B Natriuret Pep  1540 H











Impressions: 


                                        





Lumbar Spine X-Ray  08/30/20 00:05


IMPRESSION:


No acute bony injury is identified.


 








Chest X-Ray  09/02/20 00:00


IMPRESSION:  Cardiomegaly and low inspiratory lung volumes without a 

superimposed acute cardiopulmonary process.


 














Assessment & Plan





- Diagnosis


(1) Hyponatremia


Is this a current diagnosis for this admission?: Yes   


Plan: 


In the background of liver disease/cirrhosis diastolic congestive heart failure 

with obvious clinical fluid retention and lower extremity edema, I agree that 

this is hypervolemic hypotonic hyponatremia.  Both his liver disease and 

congestive heart failure causes activation of the RAAS system including the ADH 

resulting in fluid retention.  This patient might have an underlying chronic 

hyponatremia but we do not know his baseline since we do not have any previous 

records.


Attempted fluid bolus of normal saline yesterday because the decrease of his 

sodium from 126-122.


As this patient would need maintenance diuretics due to his underlying liver 

disease, I will try to increase his diuretics first before giving tolvaptan.  If

this does not work then I would need to stop his diuretics before initiating 

tolvaptan.


Agree with discontinuation of demeclocycline as it is usually ineffective.  May 

continue the salt tablets for now.  Will recheck sodium level tomorrow.








(2) Acute kidney injury


Is this a current diagnosis for this admission?: Yes   


Plan: 


Patient's creatinine is improved from 2.11 to current of 1.6.  Acute worsening 

of the kidney function initially could be due to prerenal azotemia due to the 

combination of cirrhosis and diastolic congestive heart failure.  Baseline 

kidney function is unknown but patient may also have an underlying chronic 

kidney disease.  Patient has an adequate urine output.








(3) Chronic liver disease and cirrhosis


Is this a current diagnosis for this admission?: Yes   


Plan: 


By history patient reports that he has alcoholic liver disease.








(4) Hyperammonemia


Is this a current diagnosis for this admission?: Yes   


Plan: 


Defer to the hospitalist service.








(5) Congestive heart failure with left ventricular diastolic dysfunction


Qualifiers: 


   Congestive heart failure chronicity: chronic   Qualified Code(s): I50.32 - 

Chronic diastolic (congestive) heart failure   


Is this a current diagnosis for this admission?: Yes   


Plan: 


Echo showed ejection fraction of 60 to 65%, grade 2/4 mild to moderate diastolic

dysfunction and moderate pulmonary hypertension.








(6) Anemia


Qualifiers: 


   Anemia type: iron deficiency   Iron deficiency anemia type: unspecified iron 

deficiency   Qualified Code(s): D50.9 - Iron deficiency anemia, unspecified   


Is this a current diagnosis for this admission?: Yes   





- Notes


Notes: 





Discussed with the hospitalist, Dr. Ventura.


Thank you very much for this consultation.  I will follow the patient with you.

## 2020-09-04 LAB
ANION GAP SERPL CALC-SCNC: 7 MMOL/L (ref 5–19)
BUN SERPL-MCNC: 42 MG/DL (ref 7–20)
CALCIUM: 8.3 MG/DL (ref 8.4–10.2)
CHLORIDE SERPL-SCNC: 95 MMOL/L (ref 98–107)
CO2 SERPL-SCNC: 23 MMOL/L (ref 22–30)
GLUCOSE SERPL-MCNC: 107 MG/DL (ref 75–110)
PATH REV BLD -IMP: (no result)
POTASSIUM SERPL-SCNC: 5 MMOL/L (ref 3.6–5)

## 2020-09-04 RX ADMIN — Medication SCH ML: at 21:44

## 2020-09-04 RX ADMIN — IPRATROPIUM BROMIDE AND ALBUTEROL SULFATE PRN ML: 2.5; .5 SOLUTION RESPIRATORY (INHALATION) at 20:36

## 2020-09-04 RX ADMIN — IPRATROPIUM BROMIDE AND ALBUTEROL SULFATE PRN ML: 2.5; .5 SOLUTION RESPIRATORY (INHALATION) at 11:19

## 2020-09-04 RX ADMIN — FUROSEMIDE SCH MG: 20 TABLET ORAL at 18:48

## 2020-09-04 RX ADMIN — DOCUSATE SODIUM SCH MG: 100 CAPSULE, LIQUID FILLED ORAL at 18:47

## 2020-09-04 RX ADMIN — ALBUMIN (HUMAN) SCH MLS/HR: 12.5 SOLUTION INTRAVENOUS at 12:07

## 2020-09-04 RX ADMIN — SODIUM CHLORIDE TAB 1 GM SCH GM: 1 TAB at 10:25

## 2020-09-04 RX ADMIN — RIFAXIMIN SCH MG: 550 TABLET ORAL at 21:44

## 2020-09-04 RX ADMIN — ALBUMIN (HUMAN) SCH MLS/HR: 12.5 SOLUTION INTRAVENOUS at 12:45

## 2020-09-04 RX ADMIN — SODIUM CHLORIDE TAB 1 GM SCH GM: 1 TAB at 15:19

## 2020-09-04 RX ADMIN — COLCHICINE SCH MG: 0.6 TABLET, FILM COATED ORAL at 10:25

## 2020-09-04 RX ADMIN — GABAPENTIN SCH MG: 400 CAPSULE ORAL at 21:44

## 2020-09-04 RX ADMIN — CYCLOBENZAPRINE HYDROCHLORIDE PRN MG: 10 TABLET, FILM COATED ORAL at 04:05

## 2020-09-04 RX ADMIN — GABAPENTIN SCH MG: 400 CAPSULE ORAL at 15:19

## 2020-09-04 RX ADMIN — FUROSEMIDE SCH MG: 20 TABLET ORAL at 10:25

## 2020-09-04 RX ADMIN — CYCLOBENZAPRINE HYDROCHLORIDE PRN MG: 10 TABLET, FILM COATED ORAL at 21:45

## 2020-09-04 RX ADMIN — LIDOCAINE SCH PATCH: 50 PATCH CUTANEOUS at 10:24

## 2020-09-04 RX ADMIN — SODIUM CHLORIDE TAB 1 GM SCH GM: 1 TAB at 18:47

## 2020-09-04 RX ADMIN — CARVEDILOL SCH MG: 12.5 TABLET, FILM COATED ORAL at 10:25

## 2020-09-04 RX ADMIN — Medication SCH ML: at 15:19

## 2020-09-04 RX ADMIN — INSULIN HUMAN SCH: 100 INJECTION, SOLUTION PARENTERAL at 10:18

## 2020-09-04 RX ADMIN — PANTOPRAZOLE SODIUM SCH MG: 40 TABLET, DELAYED RELEASE ORAL at 05:17

## 2020-09-04 RX ADMIN — IPRATROPIUM BROMIDE AND ALBUTEROL SULFATE PRN ML: 2.5; .5 SOLUTION RESPIRATORY (INHALATION) at 00:23

## 2020-09-04 RX ADMIN — Medication SCH ML: at 05:16

## 2020-09-04 RX ADMIN — GABAPENTIN SCH MG: 400 CAPSULE ORAL at 05:17

## 2020-09-04 RX ADMIN — FERROUS SULFATE TAB 325 MG (65 MG ELEMENTAL FE) SCH MG: 325 (65 FE) TAB at 10:25

## 2020-09-04 RX ADMIN — FUROSEMIDE SCH MG: 20 TABLET ORAL at 15:18

## 2020-09-04 RX ADMIN — DOCUSATE SODIUM SCH MG: 100 CAPSULE, LIQUID FILLED ORAL at 10:25

## 2020-09-04 RX ADMIN — CARVEDILOL SCH: 12.5 TABLET, FILM COATED ORAL at 21:45

## 2020-09-04 NOTE — PDOC PROGRESS REPORT
Subjective


Subjective:: 





Patient admitted with severe hyponatremia, given demeclocycline and salt tablets

in ICU, sodium with very slow gradual improvement initially then sodium dropped 

off again after he was given a bolus of IV fluids for unknown reason overnight 

9/2.  Nephrology consulted and the case was discussed with him in detail.  

Patient given oral Lasix 3 times daily as well as sodium tablets 3 times daily. 

He has rather severe acute on chronic bilateral lower extremity edema.  He has 

known chronic cirrhosis with elevated bilirubin and ammonia currently on 

rifaximin and lactulose has been stopped due to diarrhea.  Hemoglobin and WBC 

are lower.  Sodium down to 122.7.  And creatinine is the same as yesterday at 

1.68.  Patient may benefit from urea or tolvaptan, defer to nephrology.





9/4/2020


Sodium now gradually rising on higher dose of salt tablets.  Creatinine is also 

rising a bit now at 1.82.  Sodium is currently 124.7.  Platelets have dropped 

significantly and we are holding pharmacologic DVT prophylaxis at this time.  

Follow-up CBC in the morning.  We will add thigh-high compression stockings as 

well to help with his lower extremity edema which is actually already looking 

somewhat better today.  I discussed the case with nephrology today and they are 

continuing to adjust medications to help with increasing sodium.  Our goal would

be to get him up to a sodium of 130.


Reason For Visit: 


HYPERKALEMIA,HYPONATREMIA








Physical Exam


Vital Signs: 


                                        











Temp Pulse Resp BP Pulse Ox


 


 98.4 F   86   21 H  124/54 L  97 


 


 09/04/20 03:22  09/04/20 11:19  09/04/20 11:19  09/04/20 03:22  09/04/20 11:19








                                 Intake & Output











 09/03/20 09/04/20 09/05/20





 06:59 06:59 06:59


 


Intake Total 3463 2315 392


 


Output Total 2200 2625 1200


 


Balance 3743 -660 -799


 


Weight 146.3 kg 140.6 kg 











Exam: 





General appearance: PRESENT: no acute distress, morbidly obese, well-developed, 

well-nourished, states his edema is a bit less today


Head exam: PRESENT: atraumatic, normocephalic


Eye exam: PRESENT: conjunctiva pink, other - Prosthetic right eye


Mouth exam: PRESENT: moist


Respiratory exam: PRESENT: clear to auscultation karma.  ABSENT: rales, rhonchi, 

wheezes


Cardiovascular exam: PRESENT: RRR.  ABSENT: diastolic murmur, rubs, systolic 

murmur


GI/Abdominal exam: PRESENT: normal bowel sounds, soft.  ABSENT: distended, 

guarding, mass, organolmegaly, rebound, tenderness


Extremities exam: PRESENT: pedal edema, +2 edema mildly improved


Neurological exam: PRESENT: alert, awake, oriented to person, oriented to place,

oriented to time, oriented to situation


Psychiatric exam: PRESENT: appropriate affect, normal mood


Skin exam: PRESENT: dry, intact, warm





Results


Laboratory Results: 


                                        





                                 09/03/20 05:59 





                                 09/04/20 07:00 





                                        











  09/04/20





  07:00


 


Sodium  124.7 L


 


Potassium  5.0


 


Chloride  95 L


 


Carbon Dioxide  23


 


Anion Gap  7


 


BUN  42 H


 


Creatinine  1.82 H


 


Est GFR (African Amer)  46 L


 


Glucose  107


 


Calcium  8.3 L








                                        











  08/30/20 08/30/20 08/30/20





  00:23 00:23 03:37


 


Creatine Kinase  195 H   184 H


 


CK-MB (CK-2)   4.09 


 


Troponin I   < 0.012 


 


NT-Pro-B Natriuret Pep   














  08/31/20





  06:05


 


Creatine Kinase 


 


CK-MB (CK-2) 


 


Troponin I 


 


NT-Pro-B Natriuret Pep  1540 H











Impressions: 


                                        





Lumbar Spine X-Ray  08/30/20 00:05


IMPRESSION:


No acute bony injury is identified.


 








Chest X-Ray  09/02/20 00:00


IMPRESSION:  Cardiomegaly and low inspiratory lung volumes without a 

superimposed acute cardiopulmonary process.


 














Assessment and Plan





- Diagnosis


(1) Hyperammonemia


Is this a current diagnosis for this admission?: Yes   





(2) Chronic liver disease and cirrhosis


Is this a current diagnosis for this admission?: Yes   





(3) Anemia


Qualifiers: 


   Anemia type: iron deficiency   Iron deficiency anemia type: unspecified iron 

deficiency   Qualified Code(s): D50.9 - Iron deficiency anemia, unspecified   


Is this a current diagnosis for this admission?: Yes   





(4) Hyperkalemia


Is this a current diagnosis for this admission?: Yes   





(5) Hyponatremia


Is this a current diagnosis for this admission?: Yes   


Plan: 


Per previous physician:


"This is a chronic issue for this patient.  Sodium is up to 126 today.  Continue

to monitor sodium levels.


Consider discontinuing demeclocycline and adding a fluid restriction."





9/3/2020


Nephrology consulted, discussed the case with them in detail


Stopped demeclocycline as this is likely ineffective and puts patient at risk 

for side effects and drug interactions


Increased sodium tablet frequency to 3 times daily, continue Lasix


Consider tolvaptan or urea treatment





9/4/2020


Sodium up to 124.7


3 times daily oral Lasix, salt tablets


May benefit from tolvaptan or urea








(6) Leucocytosis


Qualifiers: 


   Leukocytosis type: unspecified   Qualified Code(s): D72.829 - Elevated white 

blood cell count, unspecified   


Is this a current diagnosis for this admission?: Yes   





(7) Acute on chronic diastolic CHF (congestive heart failure)


Is this a current diagnosis for this admission?: Yes   





(8) Alcoholic cirrhosis of liver


Qualifiers: 


   Ascites presence: unspecified   Qualified Code(s): K70.30 - Alcoholic 

cirrhosis of liver without ascites   


Is this a current diagnosis for this admission?: Yes   


Plan: 





Diagnosed within the past year per patient


Low albumin, elevated bilirubin, LFTs only mildly elevated


Aldactone held due to hyponatremia, Lasix dosing adjusted








(9) Thrombocytopenia


Is this a current diagnosis for this admission?: Yes   


Plan: 





Unknown etiology, presumably due to chronic cirrhosis and bone marrow 

suppression


Trend CBC








(10) Bilateral lower extremity edema


Is this a current diagnosis for this admission?: Yes   


Plan: 





Chronic


Diuresis


Compression stockings thigh-high








- Time


Time Spent with patient: 25-34 minutes


Medications reviewed and adjusted accordingly: Yes


Anticipated Discharge Disposition: Home, Self Care


Anticipated Discharge Timeframe: within 36 hours





- Inpatient Certification


Based on my medical assessment, after consideration of the patient's comorbiditi

es, presenting symptoms, or acuity I expect that the services needed warrant 

INPATIENT care.: Yes


I certify that my determination is in accordance with my understanding of Med

icare's requirements for reasonable and necessary INPATIENT services [42 CFR 

412.3e].: Yes


Medical Necessity: Significant Comorbidiites Make Outpatient Treatment Too 

Risky, Need Close Monitoring Due to Risk of Patient Decompensation, Risk of 

Complication if Not Cared For in Hospital, Risk of Diagnosis Which Will Require 

Inpatient Eval/Care/Monitoring

## 2020-09-04 NOTE — PDOC PROGRESS REPORT
Subjective


Progress Note for:: 09/04/20


Subjective:: 





Patient is sleeping when I entered the room opens his eyes a little bit but went

back to sleep.  His urine output increased with increasing Lasix yesterday to 

2575 mL for the past 24 hours total with a balance of -310.


Reason For Visit: 


HYPERKALEMIA,HYPONATREMIA








Physical Exam


Vital Signs: 


                                        











Temp Pulse Resp BP Pulse Ox


 


 98.4 F   76   17   124/54 L  97 


 


 09/04/20 03:22  09/04/20 03:22  09/04/20 03:22  09/04/20 03:22  09/04/20 03:22








                                 Intake & Output











 09/03/20 09/04/20 09/05/20





 06:59 06:59 06:59


 


Intake Total 3463 2315 


 


Output Total 2200 2625 


 


Balance 1263 -310 


 


Weight 146.3 kg 140.6 kg 











Exam: 





General appearance: PRESENT: no acute distress, cooperative, well-developed, 

well-nourished


Head exam: PRESENT: atraumatic, normocephalic


Eye exam: PRESENT: conjunctiva pale, PERRLA.  Right eye prosthesis ABSENT: 

scleral icterus


Neck exam: ABSENT: JVD


Respiratory exam: PRESENT: Diminished breath sounds.  ABSENT: crackles, rales, 

rhonchi, unlabored, wheezes


Cardiovascular exam: PRESENT: Regular rate rhythm -+S1, +S2.  ABSENT: diastolic 

murmur, systolic murmur


GI/Abdominal exam: PRESENT: normal bowel sounds, soft.  ABSENT: guarding, mass, 

tenderness


Extremities exam: Grade 3-4 bilateral lower extremity pitting edema


Neurological exam: PRESENT: Asleep but arousable


Skin exam: PRESENT: dry, warm,





Results


Laboratory Results: 


                                        





                                 09/03/20 05:59 





                                 09/04/20 07:00 





                                        











  09/03/20 09/03/20 09/03/20





  10:35 10:35 10:35


 


Retic Count (auto)  2.71  


 


Sodium   


 


Potassium   


 


Chloride   


 


Carbon Dioxide   


 


Anion Gap   


 


BUN   


 


Creatinine   


 


Est GFR (African Amer)   


 


Glucose   


 


Calcium   


 


Iron   < 10.1 L 


 


TIBC   254 


 


Ferritin   128.00 


 


Ammonia    52.5 H


 


Vitamin B12   > 1000.0 H 


 


Folate   18.10 














  09/04/20





  07:00


 


Retic Count (auto) 


 


Sodium  124.7 L


 


Potassium  5.0


 


Chloride  95 L


 


Carbon Dioxide  23


 


Anion Gap  7


 


BUN  42 H


 


Creatinine  1.82 H


 


Est GFR (African Amer)  46 L


 


Glucose  107


 


Calcium  8.3 L


 


Iron 


 


TIBC 


 


Ferritin 


 


Ammonia 


 


Vitamin B12 


 


Folate 








                                        











  08/30/20 08/30/20 08/30/20





  00:23 00:23 03:37


 


Creatine Kinase  195 H   184 H


 


CK-MB (CK-2)   4.09 


 


Troponin I   < 0.012 


 


NT-Pro-B Natriuret Pep   














  08/31/20





  06:05


 


Creatine Kinase 


 


CK-MB (CK-2) 


 


Troponin I 


 


NT-Pro-B Natriuret Pep  1540 H











Impressions: 


                                        





Lumbar Spine X-Ray  08/30/20 00:05


IMPRESSION:


No acute bony injury is identified.


 








Chest X-Ray  09/02/20 00:00


IMPRESSION:  Cardiomegaly and low inspiratory lung volumes without a 

superimposed acute cardiopulmonary process.


 














Assessment & Plan





- Diagnosis


(1) Hyponatremia


Is this a current diagnosis for this admission?: Yes   


Plan: 


Withthe background of liver disease/cirrhosis, diastolic congestive heart 

failure with obvious clinical fluid retention and lower extremity edema, I agree

that this is hypervolemic hypotonic hyponatremia.  Both his liver disease and 

congestive heart failure causes activation of the RAAS system including the ADH 

resulting in fluid retention.  This patient might have an underlying chronic 

hyponatremia but we do not know his baseline since we do not have any previous 

records.  Mental status is affected by hyponatremia.





As this patient would need maintenance diuretics due to his underlying liver 

disease, I increased his diuretics first before giving tolvaptan.  If this does 

not work then I would need to stop his diuretics before initiating tolvaptan.  

Patient sodium has risen from 122.7-124.7.  Continue same dose of Lasix.  

Continue to hold tolvaptan for now.


Agree with discontinuation of demeclocycline as it is usually ineffective.  May 

continue the salt tablets for now.  Continue to monitor sodium level.








(2) Acute kidney injury


Is this a current diagnosis for this admission?: Yes   


Plan: 


Patient's creatinine is improved from 2.11 to current of 1.6.  Acute worsening 

of the kidney function initially could be due to prerenal azotemia due to the 

combination of cirrhosis and diastolic congestive heart failure.  Baseline 

kidney function is unknown but patient may also have an underlying chronic 

kidney disease.  Patient has an adequate urine output.


Creatinine went up from 1.6-1.8 due to increasing diuretics as expected.  

Continue to monitor.








(3) Chronic liver disease and cirrhosis


Is this a current diagnosis for this admission?: Yes   


Plan: 


By history patient reports that he has alcoholic liver disease.








(4) Hyperammonemia


Is this a current diagnosis for this admission?: Yes   


Plan: 


Improving.  It is highly likely that patient's mental status is affected by high

ammonia level as well as hyponatremia.








(5) Congestive heart failure with left ventricular diastolic dysfunction


Qualifiers: 


   Congestive heart failure chronicity: chronic   Qualified Code(s): I50.32 - 

Chronic diastolic (congestive) heart failure   


Is this a current diagnosis for this admission?: Yes   


Plan: 


Echo showed ejection fraction of 60 to 65%, grade 2/4 mild to moderate diastolic

dysfunction and moderate pulmonary hypertension.








(6) Anemia


Qualifiers: 


   Anemia type: iron deficiency   Iron deficiency anemia type: unspecified iron 

deficiency   Qualified Code(s): D50.9 - Iron deficiency anemia, unspecified   


Is this a current diagnosis for this admission?: Yes   





- Time


Time with patient: 15-25 minutes

## 2020-09-05 LAB
ABSOLUTE LYMPHOCYTES# (MANUAL): 1.5 10^3/UL (ref 0.5–4.7)
ABSOLUTE MONOCYTES # (MANUAL): 1.1 10^3/UL (ref 0.1–1.4)
ADD MANUAL DIFF: YES
ANION GAP SERPL CALC-SCNC: 10 MMOL/L (ref 5–19)
ANISOCYTOSIS BLD QL SMEAR: (no result)
BASO STIPL BLD QL SMEAR: PRESENT
BASOPHILS NFR BLD MANUAL: 0 % (ref 0–2)
BUN SERPL-MCNC: 45 MG/DL (ref 7–20)
CALCIUM: 8.8 MG/DL (ref 8.4–10.2)
CHLORIDE SERPL-SCNC: 97 MMOL/L (ref 98–107)
CO2 SERPL-SCNC: 25 MMOL/L (ref 22–30)
DACRYOCYTES BLD QL SMEAR: SLIGHT
EOSINOPHIL NFR BLD MANUAL: 0 % (ref 0–6)
ERYTHROCYTE [DISTWIDTH] IN BLOOD BY AUTOMATED COUNT: 15.9 % (ref 11.5–14)
GLUCOSE SERPL-MCNC: 118 MG/DL (ref 75–110)
HCT VFR BLD CALC: 24.2 % (ref 37.9–51)
HGB BLD-MCNC: 8.5 G/DL (ref 13.5–17)
MCH RBC QN AUTO: 33.9 PG (ref 27–33.4)
MCHC RBC AUTO-ENTMCNC: 34.9 G/DL (ref 32–36)
MCV RBC AUTO: 97 FL (ref 80–97)
MONOCYTES % (MANUAL): 9 % (ref 3–13)
NEUTS BAND NFR BLD MANUAL: 1 % (ref 3–5)
POIKILOCYTOSIS BLD QL SMEAR: SLIGHT
POLYCHROMASIA BLD QL SMEAR: (no result)
POTASSIUM SERPL-SCNC: 4.7 MMOL/L (ref 3.6–5)
RBC # BLD AUTO: 2.5 10^6/UL (ref 4.35–5.55)
SEGMENTED NEUTROPHILS % (MAN): 78 % (ref 42–78)
TOTAL CELLS COUNTED BLD: 100
TOXIC GRANULES BLD QL SMEAR: (no result)
VARIANT LYMPHS NFR BLD MANUAL: 5 % (ref 13–45)
WBC # BLD AUTO: 12.1 10^3/UL (ref 4–10.5)

## 2020-09-05 RX ADMIN — CYCLOBENZAPRINE HYDROCHLORIDE PRN MG: 10 TABLET, FILM COATED ORAL at 15:23

## 2020-09-05 RX ADMIN — CARVEDILOL SCH MG: 12.5 TABLET, FILM COATED ORAL at 21:55

## 2020-09-05 RX ADMIN — FUROSEMIDE SCH MG: 20 TABLET ORAL at 17:48

## 2020-09-05 RX ADMIN — GABAPENTIN SCH MG: 400 CAPSULE ORAL at 05:10

## 2020-09-05 RX ADMIN — INSULIN HUMAN SCH: 100 INJECTION, SOLUTION PARENTERAL at 08:19

## 2020-09-05 RX ADMIN — CYCLOBENZAPRINE HYDROCHLORIDE PRN MG: 10 TABLET, FILM COATED ORAL at 05:10

## 2020-09-05 RX ADMIN — Medication SCH ML: at 14:34

## 2020-09-05 RX ADMIN — Medication SCH ML: at 21:54

## 2020-09-05 RX ADMIN — RIFAXIMIN SCH MG: 550 TABLET ORAL at 21:54

## 2020-09-05 RX ADMIN — SODIUM CHLORIDE TAB 1 GM SCH GM: 1 TAB at 10:52

## 2020-09-05 RX ADMIN — FUROSEMIDE SCH MG: 20 TABLET ORAL at 10:52

## 2020-09-05 RX ADMIN — Medication SCH ML: at 05:10

## 2020-09-05 RX ADMIN — COLCHICINE SCH MG: 0.6 TABLET, FILM COATED ORAL at 10:52

## 2020-09-05 RX ADMIN — DOCUSATE SODIUM SCH MG: 100 CAPSULE, LIQUID FILLED ORAL at 17:48

## 2020-09-05 RX ADMIN — BENZONATATE PRN MG: 100 CAPSULE ORAL at 16:03

## 2020-09-05 RX ADMIN — DOCUSATE SODIUM SCH: 100 CAPSULE, LIQUID FILLED ORAL at 10:47

## 2020-09-05 RX ADMIN — CARVEDILOL SCH MG: 12.5 TABLET, FILM COATED ORAL at 10:52

## 2020-09-05 RX ADMIN — IPRATROPIUM BROMIDE AND ALBUTEROL SULFATE PRN ML: 2.5; .5 SOLUTION RESPIRATORY (INHALATION) at 13:35

## 2020-09-05 RX ADMIN — GABAPENTIN SCH MG: 400 CAPSULE ORAL at 21:55

## 2020-09-05 RX ADMIN — LIDOCAINE SCH PATCH: 50 PATCH CUTANEOUS at 10:52

## 2020-09-05 RX ADMIN — GABAPENTIN SCH MG: 400 CAPSULE ORAL at 14:34

## 2020-09-05 RX ADMIN — PANTOPRAZOLE SODIUM SCH MG: 40 TABLET, DELAYED RELEASE ORAL at 05:10

## 2020-09-05 RX ADMIN — SODIUM CHLORIDE TAB 1 GM SCH GM: 1 TAB at 14:34

## 2020-09-05 RX ADMIN — SODIUM CHLORIDE TAB 1 GM SCH GM: 1 TAB at 17:48

## 2020-09-05 RX ADMIN — FUROSEMIDE SCH MG: 20 TABLET ORAL at 14:34

## 2020-09-05 RX ADMIN — FERROUS SULFATE TAB 325 MG (65 MG ELEMENTAL FE) SCH MG: 325 (65 FE) TAB at 10:52

## 2020-09-05 NOTE — PDOC PROGRESS REPORT
Subjective


Subjective:: 





Patient admitted with severe hyponatremia, given demeclocycline and salt tablets

in ICU, sodium with very slow gradual improvement initially then sodium dropped 

off again after he was given a bolus of IV fluids for unknown reason overnight 

9/2.  Nephrology consulted and the case was discussed with him in detail.  

Patient given oral Lasix 3 times daily as well as sodium tablets 3 times daily. 

He has rather severe acute on chronic bilateral lower extremity edema.  He has 

known chronic cirrhosis with elevated bilirubin and ammonia currently on 

rifaximin and lactulose has been stopped due to diarrhea.  Hemoglobin and WBC 

are lower.  Sodium down to 122.7.  And creatinine is the same as yesterday at 

1.68.  Patient may benefit from urea or tolvaptan, defer to nephrology.





9/4/2020


Sodium now gradually rising on higher dose of salt tablets.  Creatinine is also 

rising a bit now at 1.82.  Sodium is currently 124.7.  Platelets have dropped 

significantly and we are holding pharmacologic DVT prophylaxis at this time.  

Follow-up CBC in the morning.  We will add thigh-high compression stockings as 

well to help with his lower extremity edema which is actually already looking 

somewhat better today.  I discussed the case with nephrology today and they are 

continuing to adjust medications to help with increasing sodium.  Our goal would

be to get him up to a sodium of 130.





9/5/2020


Sodium is normal today finally.  We will continue on his salt tablets and Lasix.

 Patient needs to go to SNF and he and his sister are in agreement with this and

I will be looking for a facility for him in the near future.  Creatinine has 

gone down and he is improved overall.  He has no new complaints.  Remove FMS as 

his stools although they are loose are very infrequent now.  We will hold off on

restarting his lactulose as this will undoubtedly causes diarrhea to return 

severely.


Reason For Visit: 


HYPERKALEMIA,HYPONATREMIA








Physical Exam


Vital Signs: 


                                        











Temp Pulse Resp BP Pulse Ox


 


 98.8 F   78   22 H  111/52 L  99 


 


 09/05/20 19:44  09/05/20 19:44  09/05/20 19:44  09/05/20 19:44  09/05/20 19:44








                                 Intake & Output











 09/04/20 09/05/20 09/06/20





 06:59 06:59 06:59


 


Intake Total 2317 2962 960


 


Output Total 8084 1857 400


 


Balance -310 -2463 560


 


Weight 140.6 kg 146.7 kg 











Exam: 





General appearance: PRESENT: no acute distress, morbidly obese, well-developed, 

well-nourished, states he feels well and would like to go to rehab facility


Head exam: PRESENT: atraumatic, normocephalic


Eye exam: PRESENT: conjunctiva pink, other - Prosthetic right eye


Mouth exam: PRESENT: moist


Respiratory exam: PRESENT: clear to auscultation karma.  ABSENT: rales, rhonchi, 

wheezes


Cardiovascular exam: PRESENT: RRR.  ABSENT: diastolic murmur, rubs, systolic 

murmur


GI/Abdominal exam: PRESENT: normal bowel sounds, soft.  ABSENT: distended, 

guarding, mass, organolmegaly, rebound, tenderness


Extremities exam: PRESENT: pedal edema, +2 edema mildly improved


Neurological exam: PRESENT: alert, awake, oriented to person, oriented to place,

oriented to time, oriented to situation


Psychiatric exam: PRESENT: appropriate affect, normal mood


Skin exam: PRESENT: dry, intact, warm





Results


Laboratory Results: 


                                        





                                 09/05/20 11:42 





                                 09/05/20 05:10 





                                        











  09/05/20 09/05/20 09/05/20





  05:10 05:10 10:17


 


WBC   Cancelled  Cancelled


 


RBC   Cancelled  Cancelled


 


Hgb   Cancelled  Cancelled


 


Hct   Cancelled  Cancelled


 


MCV   Cancelled  Cancelled


 


MCH   Cancelled  Cancelled


 


MCHC   Cancelled  Cancelled


 


RDW   Cancelled  Cancelled


 


Plt Count   Cancelled  Cancelled


 


Seg Neutrophils %   Cancelled  Cancelled


 


Sodium  131.8 L  


 


Potassium  4.7  


 


Chloride  97 L  


 


Carbon Dioxide  25  


 


Anion Gap  10  


 


BUN  45 H  


 


Creatinine  1.41 H  


 


Est GFR ( Amer)  > 60  


 


Glucose  118 H  


 


Calcium  8.8  














  09/05/20





  11:42


 


WBC  12.1 H


 


RBC  2.50 L


 


Hgb  8.5 L


 


Hct  24.2 L


 


MCV  97


 


MCH  33.9 H


 


MCHC  34.9


 


RDW  15.9 H


 


Plt Count  


 


Seg Neutrophils %  Not Reportable


 


Sodium 


 


Potassium 


 


Chloride 


 


Carbon Dioxide 


 


Anion Gap 


 


BUN 


 


Creatinine 


 


Est GFR (African Amer) 


 


Glucose 


 


Calcium 








                                        











  08/30/20 08/30/20 08/30/20





  00:23 00:23 03:37


 


Creatine Kinase  195 H   184 H


 


CK-MB (CK-2)   4.09 


 


Troponin I   < 0.012 


 


NT-Pro-B Natriuret Pep   














  08/31/20





  06:05


 


Creatine Kinase 


 


CK-MB (CK-2) 


 


Troponin I 


 


NT-Pro-B Natriuret Pep  1540 H











Impressions: 


                                        





Lumbar Spine X-Ray  08/30/20 00:05


IMPRESSION:


No acute bony injury is identified.


 








Chest X-Ray  09/02/20 00:00


IMPRESSION:  Cardiomegaly and low inspiratory lung volumes without a 

superimposed acute cardiopulmonary process.


 














Assessment and Plan





- Diagnosis


(1) Hyperammonemia


Is this a current diagnosis for this admission?: Yes   


Plan: 





-Continue rifaximin


Lactulose being held for diarrhea.  Continue holding








(2) Chronic liver disease and cirrhosis


Is this a current diagnosis for this admission?: Yes   


Plan: 





Needs follow-up with GI outpatient








(3) Anemia


Qualifiers: 


   Anemia type: iron deficiency   Iron deficiency anemia type: unspecified iron 

deficiency   Qualified Code(s): D50.9 - Iron deficiency anemia, unspecified   


Is this a current diagnosis for this admission?: Yes   





(4) Hyperkalemia


Is this a current diagnosis for this admission?: Yes   





(5) Hyponatremia


Is this a current diagnosis for this admission?: Yes   


Plan: 


Per previous physician:


"This is a chronic issue for this patient.  Sodium is up to 126 today.  Continue

to monitor sodium levels.


Consider discontinuing demeclocycline and adding a fluid restriction."





9/3/2020


Nephrology consulted, discussed the case with them in detail


Stopped demeclocycline as this is likely ineffective and puts patient at risk 

for side effects and drug interactions


Increased sodium tablet frequency to 3 times daily, continue Lasix


Consider tolvaptan or urea treatment





9/4/2020


Sodium up to 124.7


3 times daily oral Lasix, salt tablets


May benefit from tolvaptan or urea





Resolved








(6) Leucocytosis


Qualifiers: 


   Leukocytosis type: unspecified   Qualified Code(s): D72.829 - Elevated white 

blood cell count, unspecified   


Is this a current diagnosis for this admission?: Yes   





(7) Acute on chronic diastolic CHF (congestive heart failure)


Is this a current diagnosis for this admission?: Yes   


Plan: 





TTE done this admission showed EF 60 to 65% with grade 2 diastolic dysfunction 

and moderate pulmonary hypertension with RVSP 50 to 55 mmHg


After adequate volume control with diuretics, may benefit from repeat 

echocardiogram to rule out other forms of pulmonary hypertension could be under

lying, could benefit from RHC in the future





Resolved








(8) Alcoholic cirrhosis of liver


Qualifiers: 


   Ascites presence: unspecified   Qualified Code(s): K70.30 - Alcoholic 

cirrhosis of liver without ascites   


Is this a current diagnosis for this admission?: Yes   





(9) Thrombocytopenia


Is this a current diagnosis for this admission?: Yes   





(10) Bilateral lower extremity edema


Is this a current diagnosis for this admission?: Yes   





- Time


Time Spent with patient: 15-24 minutes


Medications reviewed and adjusted accordingly: Yes


Anticipated Discharge Disposition: Skilled Nursing Facility


Anticipated Discharge Timeframe: within 24 hours





- Inpatient Certification


Based on my medical assessment, after consideration of the patient's 

comorbidities, presenting symptoms, or acuity I expect that the services needed 

warrant INPATIENT care.: Yes


I certify that my determination is in accordance with my understanding of 

Medicare's requirements for reasonable and necessary INPATIENT services [42 CFR 

412.3e].: Yes


Medical Necessity: Significant Comorbidiites Make Outpatient Treatment Too 

Risky, Need Close Monitoring Due to Risk of Patient Decompensation, Risk of 

Complication if Not Cared For in Hospital, Risk of Diagnosis Which Will Require 

Inpatient Eval/Care/Monitoring

## 2020-09-06 LAB
ADD MANUAL DIFF: NO
ANION GAP SERPL CALC-SCNC: 8 MMOL/L (ref 5–19)
BASOPHILS # BLD AUTO: 0.1 10^3/UL (ref 0–0.2)
BASOPHILS NFR BLD AUTO: 0.5 % (ref 0–2)
BUN SERPL-MCNC: 42 MG/DL (ref 7–20)
CALCIUM: 8.9 MG/DL (ref 8.4–10.2)
CHLORIDE SERPL-SCNC: 95 MMOL/L (ref 98–107)
CO2 SERPL-SCNC: 27 MMOL/L (ref 22–30)
EOSINOPHIL # BLD AUTO: 0.1 10^3/UL (ref 0–0.6)
EOSINOPHIL NFR BLD AUTO: 1 % (ref 0–6)
ERYTHROCYTE [DISTWIDTH] IN BLOOD BY AUTOMATED COUNT: 16 % (ref 11.5–14)
GLUCOSE SERPL-MCNC: 102 MG/DL (ref 75–110)
HCT VFR BLD CALC: 25.2 % (ref 37.9–51)
HGB BLD-MCNC: 8.8 G/DL (ref 13.5–17)
LYMPHOCYTES # BLD AUTO: 0.9 10^3/UL (ref 0.5–4.7)
LYMPHOCYTES NFR BLD AUTO: 8 % (ref 13–45)
MCH RBC QN AUTO: 34 PG (ref 27–33.4)
MCHC RBC AUTO-ENTMCNC: 35 G/DL (ref 32–36)
MCV RBC AUTO: 97 FL (ref 80–97)
MONOCYTES # BLD AUTO: 2 10^3/UL (ref 0.1–1.4)
MONOCYTES NFR BLD AUTO: 17.9 % (ref 3–13)
NEUTROPHILS # BLD AUTO: 8.2 10^3/UL (ref 1.7–8.2)
NEUTS SEG NFR BLD AUTO: 72.6 % (ref 42–78)
PLATELET # BLD: 61 10^3/UL (ref 150–450)
POTASSIUM SERPL-SCNC: 4.3 MMOL/L (ref 3.6–5)
RBC # BLD AUTO: 2.6 10^6/UL (ref 4.35–5.55)
TOTAL CELLS COUNTED % (AUTO): 100 %
WBC # BLD AUTO: 11.3 10^3/UL (ref 4–10.5)

## 2020-09-06 PROCEDURE — 5A09457 ASSISTANCE WITH RESPIRATORY VENTILATION, 24-96 CONSECUTIVE HOURS, CONTINUOUS POSITIVE AIRWAY PRESSURE: ICD-10-PCS | Performed by: INTERNAL MEDICINE

## 2020-09-06 RX ADMIN — DEXAMETHASONE SODIUM PHOSPHATE PRN MG: 10 INJECTION INTRAMUSCULAR; INTRAVENOUS at 15:57

## 2020-09-06 RX ADMIN — GABAPENTIN SCH MG: 400 CAPSULE ORAL at 21:43

## 2020-09-06 RX ADMIN — SODIUM CHLORIDE TAB 1 GM SCH GM: 1 TAB at 10:37

## 2020-09-06 RX ADMIN — CARVEDILOL SCH MG: 12.5 TABLET, FILM COATED ORAL at 21:42

## 2020-09-06 RX ADMIN — GABAPENTIN SCH MG: 400 CAPSULE ORAL at 15:27

## 2020-09-06 RX ADMIN — RIFAXIMIN SCH MG: 550 TABLET ORAL at 21:43

## 2020-09-06 RX ADMIN — PANTOPRAZOLE SODIUM SCH MG: 40 TABLET, DELAYED RELEASE ORAL at 05:17

## 2020-09-06 RX ADMIN — BENZONATATE PRN MG: 100 CAPSULE ORAL at 02:00

## 2020-09-06 RX ADMIN — FUROSEMIDE SCH MG: 20 TABLET ORAL at 10:37

## 2020-09-06 RX ADMIN — Medication SCH ML: at 05:02

## 2020-09-06 RX ADMIN — INSULIN HUMAN SCH: 100 INJECTION, SOLUTION PARENTERAL at 08:00

## 2020-09-06 RX ADMIN — CARVEDILOL SCH MG: 12.5 TABLET, FILM COATED ORAL at 10:37

## 2020-09-06 RX ADMIN — SODIUM CHLORIDE TAB 1 GM SCH GM: 1 TAB at 18:10

## 2020-09-06 RX ADMIN — DOCUSATE SODIUM SCH MG: 100 CAPSULE, LIQUID FILLED ORAL at 18:10

## 2020-09-06 RX ADMIN — FUROSEMIDE SCH MG: 20 TABLET ORAL at 18:11

## 2020-09-06 RX ADMIN — IPRATROPIUM BROMIDE AND ALBUTEROL SULFATE PRN ML: 2.5; .5 SOLUTION RESPIRATORY (INHALATION) at 11:59

## 2020-09-06 RX ADMIN — LIDOCAINE SCH PATCH: 50 PATCH CUTANEOUS at 10:37

## 2020-09-06 RX ADMIN — Medication SCH ML: at 15:32

## 2020-09-06 RX ADMIN — GABAPENTIN SCH MG: 400 CAPSULE ORAL at 05:16

## 2020-09-06 RX ADMIN — Medication SCH ML: at 21:43

## 2020-09-06 RX ADMIN — COLCHICINE SCH MG: 0.6 TABLET, FILM COATED ORAL at 10:37

## 2020-09-06 RX ADMIN — FERROUS SULFATE TAB 325 MG (65 MG ELEMENTAL FE) SCH MG: 325 (65 FE) TAB at 10:37

## 2020-09-06 RX ADMIN — BENZONATATE PRN MG: 100 CAPSULE ORAL at 18:12

## 2020-09-06 RX ADMIN — BENZONATATE PRN MG: 100 CAPSULE ORAL at 10:43

## 2020-09-06 RX ADMIN — SODIUM CHLORIDE TAB 1 GM SCH GM: 1 TAB at 15:27

## 2020-09-06 RX ADMIN — FUROSEMIDE SCH MG: 20 TABLET ORAL at 15:28

## 2020-09-06 RX ADMIN — DOCUSATE SODIUM SCH MG: 100 CAPSULE, LIQUID FILLED ORAL at 10:37

## 2020-09-06 NOTE — PDOC PROGRESS REPORT
Subjective


Subjective:: 





Patient admitted with severe hyponatremia, given demeclocycline and salt tablets

in ICU, sodium with very slow gradual improvement initially then sodium dropped 

off again after he was given a bolus of IV fluids for unknown reason overnight 

9/2.  Nephrology consulted and the case was discussed with him in detail.  

Patient given oral Lasix 3 times daily as well as sodium tablets 3 times daily. 

He has rather severe acute on chronic bilateral lower extremity edema.  He has 

known chronic cirrhosis with elevated bilirubin and ammonia currently on 

rifaximin and lactulose has been stopped due to diarrhea.  Hemoglobin and WBC 

are lower.  Sodium down to 122.7.  And creatinine is the same as yesterday at 

1.68.  Patient may benefit from urea or tolvaptan, defer to nephrology.





9/4/2020


Sodium now gradually rising on higher dose of salt tablets.  Creatinine is also 

rising a bit now at 1.82.  Sodium is currently 124.7.  Platelets have dropped 

significantly and we are holding pharmacologic DVT prophylaxis at this time.  

Follow-up CBC in the morning.  We will add thigh-high compression stockings as 

well to help with his lower extremity edema which is actually already looking 

somewhat better today.  I discussed the case with nephrology today and they are 

continuing to adjust medications to help with increasing sodium.  Our goal would

be to get him up to a sodium of 130.





9/5/2020


Sodium is normal today finally.  We will continue on his salt tablets and Lasix.

 Patient needs to go to SNF and he and his sister are in agreement with this and

I will be looking for a facility for him in the near future.  Creatinine has 

gone down and he is improved overall.  He has no new complaints.  Remove FMS as 

his stools although they are loose are very infrequent now.  We will hold off on

restarting his lactulose as this will undoubtedly causes diarrhea to return 

severely.





9/6/2020


When I visited the patient this morning, he was sleeping and seem to be having a

great deal difficulty drawing his breaths through what is clearly obstructive 

sleep apnea.  I discussed this with the nurse and have ordered a BiPAP for him 

to use nightly.  He would most likely greatly benefit from sleeping with 

positive pressure assisting his breathing long-term.  We get this for him at 

discharge I think will help him greatly.  Sodium is normal.  His platelets seem 

to have dropped due to clumping in the tube and I have ordered a repeat of this 

using a citrate tube instead of EDTA.  Cover testing is pending for his 

placement at [facility].  He has no new complaints today.


Reason For Visit: 


HYPERKALEMIA,HYPONATREMIA








Physical Exam


Vital Signs: 


                                        











Temp Pulse Resp BP Pulse Ox


 


 98.8 F   77   18   123/55 L  97 


 


 09/06/20 11:54  09/06/20 12:00  09/06/20 12:00  09/06/20 11:54  09/06/20 12:00








                                 Intake & Output











 09/05/20 09/06/20 09/07/20





 06:59 06:59 06:59


 


Intake Total 2962 1260 600


 


Output Total 5490 3550 1000


 


Balance -5443 -2290 -400


 


Weight 146.7 kg 133.5 kg 











Exam: 





General appearance: PRESENT: no acute distress, morbidly obese, well-developed, 

well-nourished, seen sleeping initially on encounter with obvious obstructive 

sleep apnea


Head exam: PRESENT: atraumatic, normocephalic


Eye exam: PRESENT: conjunctiva pink, other - Prosthetic right eye


Mouth exam: PRESENT: moist


Respiratory exam: PRESENT: clear to auscultation karma.  ABSENT: rales, rhonchi, 

wheezes


Cardiovascular exam: PRESENT: RRR.  ABSENT: diastolic murmur, rubs, systolic 

murmur


GI/Abdominal exam: PRESENT: normal bowel sounds, soft.  ABSENT: distended, gu

arding, mass, organolmegaly, rebound, tenderness


Extremities exam: PRESENT: pedal edema, +2 edema mildly improved


Neurological exam: PRESENT: alert, awake, oriented to person, oriented to place,

oriented to time, oriented to situation


Psychiatric exam: PRESENT: appropriate affect, normal mood


Skin exam: PRESENT: dry, intact, warm





Results


Laboratory Results: 


                                        





                                 09/06/20 10:25 





                                 09/06/20 05:19 





                                        











  09/06/20 09/06/20





  05:19 10:25


 


WBC   11.3 H


 


RBC   2.60 L


 


Hgb   8.8 L


 


Hct   25.2 L


 


MCV   97


 


MCH   34.0 H


 


MCHC   35.0


 


RDW   16.0 H


 


Plt Count   61 L


 


Seg Neutrophils %   72.6


 


Sodium  130.0 L 


 


Potassium  4.3 


 


Chloride  95 L 


 


Carbon Dioxide  27 


 


Anion Gap  8 


 


BUN  42 H 


 


Creatinine  1.26 H 


 


Est GFR (African Amer)  > 60 


 


Glucose  102 


 


Calcium  8.9 








                                        











  08/30/20 08/30/20 08/30/20





  00:23 00:23 03:37


 


Creatine Kinase  195 H   184 H


 


CK-MB (CK-2)   4.09 


 


Troponin I   < 0.012 


 


NT-Pro-B Natriuret Pep   














  08/31/20





  06:05


 


Creatine Kinase 


 


CK-MB (CK-2) 


 


Troponin I 


 


NT-Pro-B Natriuret Pep  1540 H











Impressions: 


                                        





Lumbar Spine X-Ray  08/30/20 00:05


IMPRESSION:


No acute bony injury is identified.


 








Chest X-Ray  09/02/20 00:00


IMPRESSION:  Cardiomegaly and low inspiratory lung volumes without a 

superimposed acute cardiopulmonary process.


 














Assessment and Plan





- Diagnosis


(1) Hyponatremia


Is this a current diagnosis for this admission?: Yes   


Plan: 


Per previous physician:


"This is a chronic issue for this patient.  Sodium is up to 126 today.  Continue

to monitor sodium levels.


Consider discontinuing demeclocycline and adding a fluid restriction."





9/3/2020


Nephrology consulted, discussed the case with them in detail


Stopped demeclocycline as this is likely ineffective and puts patient at risk 

for side effects and drug interactions


Increased sodium tablet frequency to 3 times daily, continue Lasix


Consider tolvaptan or urea treatment





9/4/2020


Sodium up to 124.7


3 times daily oral Lasix, salt tablets


May benefit from tolvaptan or urea





Creatinine improving and sodium has been normalized








(2) Hyperammonemia


Is this a current diagnosis for this admission?: Yes   





(3) Chronic liver disease and cirrhosis


Is this a current diagnosis for this admission?: Yes   


Plan: 





Needs follow-up with GI outpatient


Stable








(4) Anemia


Qualifiers: 


   Anemia type: iron deficiency   Iron deficiency anemia type: unspecified iron 

deficiency   Qualified Code(s): D50.9 - Iron deficiency anemia, unspecified   


Is this a current diagnosis for this admission?: Yes   





(5) Hyperkalemia


Is this a current diagnosis for this admission?: Yes   





(6) Leucocytosis


Qualifiers: 


   Leukocytosis type: unspecified   Qualified Code(s): D72.829 - Elevated white 

blood cell count, unspecified   


Is this a current diagnosis for this admission?: Yes   





(7) Acute on chronic diastolic CHF (congestive heart failure)


Is this a current diagnosis for this admission?: Yes   





(8) Alcoholic cirrhosis of liver


Qualifiers: 


   Ascites presence: unspecified   Qualified Code(s): K70.30 - Alcoholic 

cirrhosis of liver without ascites   


Is this a current diagnosis for this admission?: Yes   





(9) Thrombocytopenia


Is this a current diagnosis for this admission?: Yes   





(10) Bilateral lower extremity edema


Is this a current diagnosis for this admission?: Yes   





(11) Vomiting


Qualifiers: 


   Vomiting type: unspecified   Vomiting Intractability: non-intractable   

Nausea presence: with nausea   Qualified Code(s): R11.2 - Nausea with vomiting, 

unspecified   


Is this a current diagnosis for this admission?: Yes   


Plan: 


Antiemetics








- Time


Time Spent with patient: 25-34 minutes


Anticipated Discharge Disposition: Skilled Nursing Facility


Anticipated Discharge Timeframe: within 48 hours





- Inpatient Certification


Based on my medical assessment, after consideration of the patient's 

comorbidities, presenting symptoms, or acuity I expect that the services needed 

warrant INPATIENT care.: Yes


I certify that my determination is in accordance with my understanding of 

Medicare's requirements for reasonable and necessary INPATIENT services [42 CFR 

412.3e].: Yes


Medical Necessity: Significant Comorbidiites Make Outpatient Treatment Too 

Risky, Need Close Monitoring Due to Risk of Patient Decompensation, Risk of 

Complication if Not Cared For in Hospital, Risk of Diagnosis Which Will Require 

Inpatient Eval/Care/Monitoring

## 2020-09-07 RX ADMIN — Medication PRN EACH: at 17:36

## 2020-09-07 RX ADMIN — IPRATROPIUM BROMIDE AND ALBUTEROL SULFATE PRN ML: 2.5; .5 SOLUTION RESPIRATORY (INHALATION) at 09:46

## 2020-09-07 RX ADMIN — DOCUSATE SODIUM SCH MG: 100 CAPSULE, LIQUID FILLED ORAL at 10:27

## 2020-09-07 RX ADMIN — SODIUM CHLORIDE TAB 1 GM SCH GM: 1 TAB at 18:26

## 2020-09-07 RX ADMIN — Medication SCH ML: at 22:38

## 2020-09-07 RX ADMIN — SODIUM CHLORIDE TAB 1 GM SCH GM: 1 TAB at 10:27

## 2020-09-07 RX ADMIN — FUROSEMIDE SCH MG: 20 TABLET ORAL at 10:27

## 2020-09-07 RX ADMIN — GABAPENTIN SCH MG: 400 CAPSULE ORAL at 05:49

## 2020-09-07 RX ADMIN — GABAPENTIN SCH MG: 400 CAPSULE ORAL at 15:58

## 2020-09-07 RX ADMIN — BENZONATATE PRN MG: 100 CAPSULE ORAL at 18:27

## 2020-09-07 RX ADMIN — FUROSEMIDE SCH MG: 20 TABLET ORAL at 15:58

## 2020-09-07 RX ADMIN — LIDOCAINE SCH PATCH: 50 PATCH CUTANEOUS at 10:27

## 2020-09-07 RX ADMIN — COLCHICINE SCH MG: 0.6 TABLET, FILM COATED ORAL at 10:27

## 2020-09-07 RX ADMIN — FAMOTIDINE SCH MG: 20 TABLET, FILM COATED ORAL at 22:29

## 2020-09-07 RX ADMIN — SODIUM CHLORIDE TAB 1 GM SCH GM: 1 TAB at 15:58

## 2020-09-07 RX ADMIN — PANTOPRAZOLE SODIUM SCH MG: 40 TABLET, DELAYED RELEASE ORAL at 05:49

## 2020-09-07 RX ADMIN — Medication SCH ML: at 05:49

## 2020-09-07 RX ADMIN — GABAPENTIN SCH MG: 400 CAPSULE ORAL at 22:29

## 2020-09-07 RX ADMIN — Medication PRN EACH: at 22:30

## 2020-09-07 RX ADMIN — FERROUS SULFATE TAB 325 MG (65 MG ELEMENTAL FE) SCH MG: 325 (65 FE) TAB at 10:27

## 2020-09-07 RX ADMIN — INSULIN HUMAN SCH: 100 INJECTION, SOLUTION PARENTERAL at 08:08

## 2020-09-07 RX ADMIN — BENZONATATE PRN MG: 100 CAPSULE ORAL at 10:27

## 2020-09-07 RX ADMIN — FAMOTIDINE SCH MG: 20 TABLET, FILM COATED ORAL at 10:27

## 2020-09-07 RX ADMIN — DOCUSATE SODIUM SCH MG: 100 CAPSULE, LIQUID FILLED ORAL at 18:26

## 2020-09-07 RX ADMIN — Medication SCH ML: at 15:58

## 2020-09-07 RX ADMIN — CARVEDILOL SCH MG: 12.5 TABLET, FILM COATED ORAL at 10:27

## 2020-09-07 RX ADMIN — CARVEDILOL SCH MG: 12.5 TABLET, FILM COATED ORAL at 22:28

## 2020-09-07 RX ADMIN — FUROSEMIDE SCH MG: 20 TABLET ORAL at 18:26

## 2020-09-07 NOTE — PDOC PROGRESS REPORT
Subjective


Subjective:: 





Patient admitted with severe hyponatremia, given demeclocycline and salt tablets

in ICU, sodium with very slow gradual improvement initially then sodium dropped 

off again after he was given a bolus of IV fluids for unknown reason overnight 

9/2.  Nephrology consulted and the case was discussed with him in detail.  

Patient given oral Lasix 3 times daily as well as sodium tablets 3 times daily. 

He has rather severe acute on chronic bilateral lower extremity edema.  He has 

known chronic cirrhosis with elevated bilirubin and ammonia currently on 

rifaximin and lactulose has been stopped due to diarrhea.  Hemoglobin and WBC 

are lower.  Sodium down to 122.7.  And creatinine is the same as yesterday at 

1.68.  Patient may benefit from urea or tolvaptan, defer to nephrology.





9/4/2020


Sodium now gradually rising on higher dose of salt tablets.  Creatinine is also 

rising a bit now at 1.82.  Sodium is currently 124.7.  Platelets have dropped 

significantly and we are holding pharmacologic DVT prophylaxis at this time.  

Follow-up CBC in the morning.  We will add thigh-high compression stockings as 

well to help with his lower extremity edema which is actually already looking 

somewhat better today.  I discussed the case with nephrology today and they are 

continuing to adjust medications to help with increasing sodium.  Our goal would

be to get him up to a sodium of 130.





9/5/2020


Sodium is normal today finally.  We will continue on his salt tablets and Lasix.

 Patient needs to go to SNF and he and his sister are in agreement with this and

I will be looking for a facility for him in the near future.  Creatinine has 

gone down and he is improved overall.  He has no new complaints.  Remove FMS as 

his stools although they are loose are very infrequent now.  We will hold off on

restarting his lactulose as this will undoubtedly causes diarrhea to return 

severely.





9/6/2020


When I visited the patient this morning, he was sleeping and seem to be having a

great deal difficulty drawing his breaths through what is clearly obstructive 

sleep apnea.  I discussed this with the nurse and have ordered a BiPAP for him 

to use nightly.  He would most likely greatly benefit from sleeping with 

positive pressure assisting his breathing long-term.  We get this for him at 

discharge I think will help him greatly.  Sodium is normal.  His platelets seem 

to have dropped due to clumping in the tube and I have ordered a repeat of this 

using a citrate tube instead of EDTA.  Cover testing is pending for his 

placement at SNF.  He has no new complaints today.





9/7/2020


Patient seems to be doing quite well today and is not having any more vomiting 

since the episode he had yesterday.  Repeat checks of his platelets so showed he

does have significant thrombocytopenia but not to the point he would require 

transfusion at this time.  We will have to hold prophylactic anticoagulation due

to the low number however.  Patient is COVID negative and can go to SNF whenever

he is accepted at 1 of his chosen facilities.  We do not need to trend his 

ammonia as a rise or fall on this number does not necessarily correlate with a 

change clinically.  No new complaints.


Reason For Visit: 


HYPERKALEMIA,HYPONATREMIA








Physical Exam


Vital Signs: 


                                        











Temp Pulse Resp BP Pulse Ox


 


 99.1 F   70   18   130/50 H  96 


 


 09/07/20 11:41  09/07/20 11:41  09/07/20 11:41  09/07/20 11:41  09/07/20 11:41








                                 Intake & Output











 09/06/20 09/07/20 09/08/20





 06:59 06:59 06:59


 


Intake Total 1260 1110 920


 


Output Total 3550 5825 800


 


Balance -2290 -4715 120


 


Weight 133.5 kg 135.2 kg 











Exam: 





General appearance: PRESENT: no acute distress, morbidly obese, well-developed, 

well-nourished, states his vomiting is completely resolved


Head exam: PRESENT: atraumatic, normocephalic


Eye exam: PRESENT: conjunctiva pink, other - Prosthetic right eye


Mouth exam: PRESENT: moist


Respiratory exam: PRESENT: clear to auscultation karma.  ABSENT: rales, rhonchi, 

wheezes


Cardiovascular exam: PRESENT: RRR.  ABSENT: diastolic murmur, rubs, systolic 

murmur


GI/Abdominal exam: PRESENT: normal bowel sounds, soft.  ABSENT: distended, 

guarding, mass, organolmegaly, rebound, tenderness


Extremities exam: PRESENT: pedal edema, chronic +2 edema same


Neurological exam: PRESENT: alert, awake, oriented to person, oriented to place,

oriented to time, oriented to situation


Psychiatric exam: PRESENT: appropriate affect, normal mood


Skin exam: PRESENT: dry, intact, warm





Results


Laboratory Results: 


                                        





                                 09/06/20 10:25 





                                 09/06/20 05:19 





                                        











  09/07/20





  09:30


 


Ammonia  71.1 H








                                        











  08/30/20 08/30/20 08/30/20





  00:23 00:23 03:37


 


Creatine Kinase  195 H   184 H


 


CK-MB (CK-2)   4.09 


 


Troponin I   < 0.012 


 


NT-Pro-B Natriuret Pep   














  08/31/20





  06:05


 


Creatine Kinase 


 


CK-MB (CK-2) 


 


Troponin I 


 


NT-Pro-B Natriuret Pep  1540 H











Impressions: 


                                        





Lumbar Spine X-Ray  08/30/20 00:05


IMPRESSION:


No acute bony injury is identified.


 








Chest X-Ray  09/02/20 00:00


IMPRESSION:  Cardiomegaly and low inspiratory lung volumes without a 

superimposed acute cardiopulmonary process.


 














Assessment and Plan





- Diagnosis


(1) Hyponatremia


Is this a current diagnosis for this admission?: Yes   


Plan: 


Per previous physician:


"This is a chronic issue for this patient.  Sodium is up to 126 today.  Continue

to monitor sodium levels.


Consider discontinuing demeclocycline and adding a fluid restriction."





9/3/2020


Nephrology consulted, discussed the case with them in detail


Stopped demeclocycline as this is likely ineffective and puts patient at risk 

for side effects and drug interactions


Increased sodium tablet frequency to 3 times daily, continue Lasix


Consider tolvaptan or urea treatment





9/4/2020


Sodium up to 124.7


3 times daily oral Lasix, salt tablets


May benefit from tolvaptan or urea





Creatinine at baseline and sodium above 130








(2) Hyperammonemia


Is this a current diagnosis for this admission?: Yes   





(3) Chronic liver disease and cirrhosis


Is this a current diagnosis for this admission?: Yes   


Plan: 





Needs follow-up with GI outpatient


Stable


Continued on 3 times daily Lasix, holding Aldactone for hyponatremia








(4) Anemia


Qualifiers: 


   Anemia type: iron deficiency   Iron deficiency anemia type: unspecified iron 

deficiency   Qualified Code(s): D50.9 - Iron deficiency anemia, unspecified   


Is this a current diagnosis for this admission?: Yes   





(5) Hyperkalemia


Is this a current diagnosis for this admission?: Yes   





(6) Leucocytosis


Qualifiers: 


   Leukocytosis type: unspecified   Qualified Code(s): D72.829 - Elevated white 

blood cell count, unspecified   


Is this a current diagnosis for this admission?: Yes   





(7) Acute on chronic diastolic CHF (congestive heart failure)


Is this a current diagnosis for this admission?: Yes   


Plan: 





TTE done this admission showed EF 60 to 65% with grade 2 diastolic dysfunction 

and moderate pulmonary hypertension with RVSP 50 to 55 mmHg


After adequate volume control with diuretics, may benefit from repeat 

echocardiogram to rule out other forms of pulmonary hypertension could be 

underlying, could benefit from RHC in the future





Resolved








(8) Alcoholic cirrhosis of liver


Qualifiers: 


   Ascites presence: unspecified   Qualified Code(s): K70.30 - Alcoholic 

cirrhosis of liver without ascites   


Is this a current diagnosis for this admission?: Yes   





(9) Thrombocytopenia


Is this a current diagnosis for this admission?: Yes   





(10) Bilateral lower extremity edema


Is this a current diagnosis for this admission?: Yes   


Plan: 





Chronic


Diuresis


Compression stockings thigh-high








(11) Vomiting


Qualifiers: 


   Vomiting type: unspecified   Vomiting Intractability: non-intractable   Naus

ea presence: with nausea   Qualified Code(s): R11.2 - Nausea with vomiting, 

unspecified   


Is this a current diagnosis for this admission?: Yes   


Plan: 


Antiemetics





Resolved








- Time


Time Spent with patient: 15-24 minutes


Medications reviewed and adjusted accordingly: Yes


Anticipated Discharge Disposition: Skilled Nursing Facility


Anticipated Discharge Timeframe: within 24 hours





- Inpatient Certification


Based on my medical assessment, after consideration of the patient's 

comorbidities, presenting symptoms, or acuity I expect that the services needed 

warrant INPATIENT care.: Yes


I certify that my determination is in accordance with my understanding of 

Medicare's requirements for reasonable and necessary INPATIENT services [42 CFR 

412.3e].: Yes


Medical Necessity: Significant Comorbidiites Make Outpatient Treatment Too 

Risky, Need Close Monitoring Due to Risk of Patient Decompensation, Risk of Comp

lication if Not Cared For in Hospital, Risk of Diagnosis Which Will Require 

Inpatient Eval/Care/Monitoring

## 2020-09-08 LAB
ANION GAP SERPL CALC-SCNC: 10 MMOL/L (ref 5–19)
BUN SERPL-MCNC: 37 MG/DL (ref 7–20)
CALCIUM: 9.3 MG/DL (ref 8.4–10.2)
CHLORIDE SERPL-SCNC: 92 MMOL/L (ref 98–107)
CO2 SERPL-SCNC: 30 MMOL/L (ref 22–30)
GLUCOSE SERPL-MCNC: 118 MG/DL (ref 75–110)
POTASSIUM SERPL-SCNC: 3.9 MMOL/L (ref 3.6–5)

## 2020-09-08 RX ADMIN — Medication SCH ML: at 21:31

## 2020-09-08 RX ADMIN — PANTOPRAZOLE SODIUM SCH MG: 40 TABLET, DELAYED RELEASE ORAL at 06:10

## 2020-09-08 RX ADMIN — Medication SCH ML: at 06:10

## 2020-09-08 RX ADMIN — FUROSEMIDE SCH MG: 20 TABLET ORAL at 13:51

## 2020-09-08 RX ADMIN — FUROSEMIDE SCH MG: 20 TABLET ORAL at 17:40

## 2020-09-08 RX ADMIN — INSULIN HUMAN SCH: 100 INJECTION, SOLUTION PARENTERAL at 08:53

## 2020-09-08 RX ADMIN — LIDOCAINE SCH PATCH: 50 PATCH CUTANEOUS at 09:27

## 2020-09-08 RX ADMIN — FAMOTIDINE SCH MG: 20 TABLET, FILM COATED ORAL at 09:27

## 2020-09-08 RX ADMIN — COLCHICINE SCH MG: 0.6 TABLET, FILM COATED ORAL at 09:27

## 2020-09-08 RX ADMIN — FAMOTIDINE SCH MG: 20 TABLET, FILM COATED ORAL at 21:31

## 2020-09-08 RX ADMIN — Medication PRN EACH: at 11:30

## 2020-09-08 RX ADMIN — DOCUSATE SODIUM SCH MG: 100 CAPSULE, LIQUID FILLED ORAL at 17:40

## 2020-09-08 RX ADMIN — GABAPENTIN SCH MG: 400 CAPSULE ORAL at 06:10

## 2020-09-08 RX ADMIN — FUROSEMIDE SCH MG: 20 TABLET ORAL at 09:25

## 2020-09-08 RX ADMIN — DOCUSATE SODIUM SCH MG: 100 CAPSULE, LIQUID FILLED ORAL at 09:25

## 2020-09-08 RX ADMIN — FERROUS SULFATE TAB 325 MG (65 MG ELEMENTAL FE) SCH MG: 325 (65 FE) TAB at 09:25

## 2020-09-08 RX ADMIN — CARVEDILOL SCH MG: 12.5 TABLET, FILM COATED ORAL at 09:26

## 2020-09-08 RX ADMIN — GABAPENTIN SCH MG: 400 CAPSULE ORAL at 13:51

## 2020-09-08 RX ADMIN — CARVEDILOL SCH MG: 12.5 TABLET, FILM COATED ORAL at 21:31

## 2020-09-08 RX ADMIN — GABAPENTIN SCH MG: 400 CAPSULE ORAL at 21:31

## 2020-09-08 RX ADMIN — Medication SCH: at 13:52

## 2020-09-08 NOTE — PDOC PROGRESS REPORT
Subjective


Progress Note for:: 09/08/20


Subjective:: 





Patient eager to go home.  He states he just moved here from out of town but 

within [place].  He is yet to get established with a 

gastroenterologist/hepatologist here.  He denies any pain or shortness of 

breath.  He states he has had a bowel movement today.  He has history of 

cirrhosis which he confirms.  States that he used to take lactulose once a day 

though he was prescribed 4 times a day.  He states that taking lactulose once a 

day makes him have 4 bowel movements.


Reason For Visit: 


HYPERKALEMIA,HYPONATREMIA








Physical Exam


Vital Signs: 


                                        











Temp Pulse Resp BP Pulse Ox


 


 98.2 F   70   18   118/55 L  93 


 


 09/08/20 11:19  09/08/20 11:19  09/08/20 11:19  09/08/20 11:19  09/08/20 11:19








                                 Intake & Output











 09/07/20 09/08/20 09/09/20





 06:59 06:59 06:59


 


Intake Total 1110 1338 


 


Output Total 5879 4700 


 


Balance -4715 -2442 


 


Weight 135.2 kg 124.5 kg 











General appearance: PRESENT: no acute distress, cooperative


Neck exam: ABSENT: JVD


Respiratory exam: PRESENT: symmetrical, unlabored.  ABSENT: tachypnea, wheezes


Cardiovascular exam: PRESENT: RRR, +S1, +S2.  ABSENT: tachycardia


GI/Abdominal exam: PRESENT: distended, soft.  ABSENT: rebound, rigid, tenderness


Extremities exam: PRESENT: pedal edema, +2 edema


Neurological exam: PRESENT: alert, awake, oriented to person, oriented to place,

oriented to time, oriented to situation





Results


Laboratory Results: 


                                        





                                 09/06/20 10:25 





                                 09/08/20 11:23 





                                        











  09/08/20





  11:23


 


Sodium  131.8 L


 


Potassium  3.9


 


Chloride  92 L


 


Carbon Dioxide  30


 


Anion Gap  10


 


BUN  37 H


 


Creatinine  1.15


 


Est GFR (African Amer)  > 60


 


Glucose  118 H


 


Calcium  9.3








                                        











  08/30/20 08/30/20 08/30/20





  00:23 00:23 03:37


 


Creatine Kinase  195 H   184 H


 


CK-MB (CK-2)   4.09 


 


Troponin I   < 0.012 


 


NT-Pro-B Natriuret Pep   














  08/31/20





  06:05


 


Creatine Kinase 


 


CK-MB (CK-2) 


 


Troponin I 


 


NT-Pro-B Natriuret Pep  1540 H











Impressions: 


                                        





Lumbar Spine X-Ray  08/30/20 00:05


IMPRESSION:


No acute bony injury is identified.


 








Chest X-Ray  09/02/20 00:00


IMPRESSION:  Cardiomegaly and low inspiratory lung volumes without a 

superimposed acute cardiopulmonary process.


 














Assessment and Plan





- Diagnosis


(1) Hyponatremia


Is this a current diagnosis for this admission?: Yes   


Plan: 


His sodium remains stable at 131 today.


As his sodium is now improved, I will go ahead and discontinue sodium chloride 

tablets to avoid worsening of fluid overload state especially given his 

cirrhosis.


Continue Lasix.  Continue to hold Aldactone.


Repeat BMP in the morning.








(2) Hyperammonemia


Is this a current diagnosis for this admission?: Yes   


Plan: 


Ammonia was elevated yesterday.  However this does not clinically correlate as 

he is not having any encephalopathy at this time.


He does state that he takes lactulose 20 g daily at home even though he was pre

scribed for 4 times a day.  He states that typically 1 dose daily is enough to 

achieve 4 bowel movements.


I will resume lactulose at 20 g daily.  Monitor for dehydration.








(3) Chronic liver disease and cirrhosis


Is this a current diagnosis for this admission?: Yes   


Plan: 


Hypervolemic with fluid overload state secondary to cirrhosis.


Continue Lasix 40 g 3 times a day.  Seems to be diuresing excellently.  Will be 

cautious for dehydration.


Aldactone on hold


Lactulose resumed.


Seems to be on rifaximin at home as well.


Avoid hepatotoxic meds.


We will need to be established with a gastroenterologist or hepatologist upon 

discharge within the area.








(4) Acute on chronic diastolic CHF (congestive heart failure)


Is this a current diagnosis for this admission?: Yes   


Plan: 


TTE done this admission showed EF 60 to 65% with grade 2 diastolic dysfunction 

and moderate pulmonary hypertension with RVSP 50 to 55 mmHg


Currently improved with diuresis.  It is possible that his pulmonary 

hypertension may also be attributable to his cirrhosis [portopulmonary] and he 

will probably benefit from further evaluation in the future as outpatient.


Continue p.o. diuresis.








(5) Anemia


Qualifiers: 


   Anemia type: iron deficiency   Iron deficiency anemia type: unspecified iron 

deficiency   Qualified Code(s): D50.9 - Iron deficiency anemia, unspecified   


Is this a current diagnosis for this admission?: Yes   


Plan: 


Received Venofer.  Continue ferrous sulfate.  CBC stable.








(6) Thrombocytopenia


Is this a current diagnosis for this admission?: Yes   


Plan: 


Stable.  Secondary to cirrhosis.








(7) Debility


Is this a current diagnosis for this admission?: Yes   


Plan: 


Awaiting placement at SNF








- Time


Time Spent with patient: 15-24 minutes


Anticipated Discharge Disposition: Skilled Nursing Facility


Anticipated Discharge Timeframe: within 36 hours

## 2020-09-09 LAB
ANION GAP SERPL CALC-SCNC: 10 MMOL/L (ref 5–19)
BUN SERPL-MCNC: 31 MG/DL (ref 7–20)
CALCIUM: 9.2 MG/DL (ref 8.4–10.2)
CHLORIDE SERPL-SCNC: 91 MMOL/L (ref 98–107)
CO2 SERPL-SCNC: 31 MMOL/L (ref 22–30)
ERYTHROCYTE [DISTWIDTH] IN BLOOD BY AUTOMATED COUNT: 15.7 % (ref 11.5–14)
GLUCOSE SERPL-MCNC: 96 MG/DL (ref 75–110)
HCT VFR BLD CALC: 27.2 % (ref 37.9–51)
HGB BLD-MCNC: 9.7 G/DL (ref 13.5–17)
MCH RBC QN AUTO: 34.4 PG (ref 27–33.4)
MCHC RBC AUTO-ENTMCNC: 35.6 G/DL (ref 32–36)
MCV RBC AUTO: 97 FL (ref 80–97)
PLATELET # BLD: 90 10^3/UL (ref 150–450)
POTASSIUM SERPL-SCNC: 3.7 MMOL/L (ref 3.6–5)
RBC # BLD AUTO: 2.82 10^6/UL (ref 4.35–5.55)
WBC # BLD AUTO: 6.4 10^3/UL (ref 4–10.5)

## 2020-09-09 RX ADMIN — COLCHICINE SCH MG: 0.6 TABLET, FILM COATED ORAL at 09:59

## 2020-09-09 RX ADMIN — GABAPENTIN SCH MG: 400 CAPSULE ORAL at 06:47

## 2020-09-09 RX ADMIN — GABAPENTIN SCH MG: 400 CAPSULE ORAL at 22:07

## 2020-09-09 RX ADMIN — FERROUS SULFATE TAB 325 MG (65 MG ELEMENTAL FE) SCH MG: 325 (65 FE) TAB at 09:59

## 2020-09-09 RX ADMIN — LIDOCAINE SCH PATCH: 50 PATCH CUTANEOUS at 10:00

## 2020-09-09 RX ADMIN — Medication SCH ML: at 14:22

## 2020-09-09 RX ADMIN — LACTULOSE SCH: 20 SOLUTION ORAL at 10:27

## 2020-09-09 RX ADMIN — FAMOTIDINE SCH MG: 20 TABLET, FILM COATED ORAL at 09:59

## 2020-09-09 RX ADMIN — LACTULOSE SCH GM: 20 SOLUTION ORAL at 09:58

## 2020-09-09 RX ADMIN — BENZONATATE PRN MG: 100 CAPSULE ORAL at 06:47

## 2020-09-09 RX ADMIN — Medication SCH ML: at 06:48

## 2020-09-09 RX ADMIN — FAMOTIDINE SCH MG: 20 TABLET, FILM COATED ORAL at 22:07

## 2020-09-09 RX ADMIN — PANTOPRAZOLE SODIUM SCH MG: 40 TABLET, DELAYED RELEASE ORAL at 06:47

## 2020-09-09 RX ADMIN — Medication PRN EACH: at 14:22

## 2020-09-09 RX ADMIN — CARVEDILOL SCH MG: 12.5 TABLET, FILM COATED ORAL at 22:07

## 2020-09-09 RX ADMIN — DOCUSATE SODIUM SCH MG: 100 CAPSULE, LIQUID FILLED ORAL at 09:59

## 2020-09-09 RX ADMIN — BENZONATATE PRN MG: 100 CAPSULE ORAL at 14:22

## 2020-09-09 RX ADMIN — INSULIN HUMAN SCH: 100 INJECTION, SOLUTION PARENTERAL at 08:07

## 2020-09-09 RX ADMIN — CARVEDILOL SCH MG: 12.5 TABLET, FILM COATED ORAL at 09:59

## 2020-09-09 RX ADMIN — GABAPENTIN SCH MG: 400 CAPSULE ORAL at 14:22

## 2020-09-10 LAB
ANION GAP SERPL CALC-SCNC: 8 MMOL/L (ref 5–19)
BUN SERPL-MCNC: 29 MG/DL (ref 7–20)
CALCIUM: 9.1 MG/DL (ref 8.4–10.2)
CHLORIDE SERPL-SCNC: 91 MMOL/L (ref 98–107)
CO2 SERPL-SCNC: 31 MMOL/L (ref 22–30)
GLUCOSE SERPL-MCNC: 112 MG/DL (ref 75–110)
POTASSIUM SERPL-SCNC: 3.9 MMOL/L (ref 3.6–5)

## 2020-09-10 RX ADMIN — BENZONATATE PRN MG: 100 CAPSULE ORAL at 09:55

## 2020-09-10 RX ADMIN — DOCUSATE SODIUM SCH MG: 100 CAPSULE, LIQUID FILLED ORAL at 09:53

## 2020-09-10 RX ADMIN — CARVEDILOL SCH MG: 12.5 TABLET, FILM COATED ORAL at 09:55

## 2020-09-10 RX ADMIN — Medication SCH: at 22:23

## 2020-09-10 RX ADMIN — CARVEDILOL SCH MG: 12.5 TABLET, FILM COATED ORAL at 21:39

## 2020-09-10 RX ADMIN — Medication SCH ML: at 13:36

## 2020-09-10 RX ADMIN — Medication SCH ML: at 05:32

## 2020-09-10 RX ADMIN — LIDOCAINE SCH PATCH: 50 PATCH CUTANEOUS at 10:03

## 2020-09-10 RX ADMIN — CYCLOBENZAPRINE HYDROCHLORIDE PRN MG: 10 TABLET, FILM COATED ORAL at 21:39

## 2020-09-10 RX ADMIN — PANTOPRAZOLE SODIUM SCH MG: 40 TABLET, DELAYED RELEASE ORAL at 05:32

## 2020-09-10 RX ADMIN — Medication PRN EACH: at 09:56

## 2020-09-10 RX ADMIN — GABAPENTIN SCH MG: 400 CAPSULE ORAL at 21:39

## 2020-09-10 RX ADMIN — FAMOTIDINE SCH MG: 20 TABLET, FILM COATED ORAL at 09:53

## 2020-09-10 RX ADMIN — LACTULOSE SCH GM: 20 SOLUTION ORAL at 10:05

## 2020-09-10 RX ADMIN — CYCLOBENZAPRINE HYDROCHLORIDE PRN MG: 10 TABLET, FILM COATED ORAL at 13:28

## 2020-09-10 RX ADMIN — GABAPENTIN SCH MG: 400 CAPSULE ORAL at 13:28

## 2020-09-10 RX ADMIN — INSULIN HUMAN SCH: 100 INJECTION, SOLUTION PARENTERAL at 09:09

## 2020-09-10 RX ADMIN — COLCHICINE SCH MG: 0.6 TABLET, FILM COATED ORAL at 09:55

## 2020-09-10 RX ADMIN — FAMOTIDINE SCH MG: 20 TABLET, FILM COATED ORAL at 21:39

## 2020-09-10 RX ADMIN — GABAPENTIN SCH MG: 400 CAPSULE ORAL at 05:31

## 2020-09-10 RX ADMIN — FERROUS SULFATE TAB 325 MG (65 MG ELEMENTAL FE) SCH MG: 325 (65 FE) TAB at 09:55

## 2020-09-10 RX ADMIN — Medication SCH: at 05:04

## 2020-09-10 NOTE — PDOC PROGRESS REPORT
Subjective


Progress Note for:: 09/10/20


Subjective:: 





Patient has no complaints today.  Feels well.  He he did diurese quite a bit 

yesterday but had a lot of leakage around his condom catheter.  As a result 

output could not adequately captured.


Reason For Visit: 


HYPERKALEMIA,HYPONATREMIA








Physical Exam


Vital Signs: 


                                        











Temp Pulse Resp BP Pulse Ox


 


 99.6 F   80   16   118/60   97 


 


 09/10/20 07:40  09/10/20 07:40  09/10/20 07:40  09/10/20 07:40  09/10/20 07:40








                                 Intake & Output











 09/09/20 09/10/20 09/11/20





 06:59 06:59 06:59


 


Intake Total 1430 644 


 


Output Total 4800 700 


 


Balance -3370 -56 


 


Weight 131.6 kg 127.6 kg 











General appearance: PRESENT: no acute distress, cooperative


Neck exam: ABSENT: JVD


Respiratory exam: PRESENT: clear to auscultation karma, unlabored.  ABSENT: 

tachypnea, wheezes


Cardiovascular exam: PRESENT: +S1, +S2.  ABSENT: tachycardia


GI/Abdominal exam: PRESENT: soft.  ABSENT: rebound, rigid, tenderness


Extremities exam: ABSENT: +2 edema


Neurological exam: PRESENT: alert, awake, oriented to person, oriented to place,

oriented to time, oriented to situation


Psychiatric exam: ABSENT: agitated, anxious





Results


Laboratory Results: 


                                        





                                 09/09/20 06:30 





                                 09/10/20 06:09 





                                        











  09/10/20





  06:09


 


Sodium  129.9 L


 


Potassium  3.9


 


Chloride  91 L


 


Carbon Dioxide  31 H


 


Anion Gap  8


 


BUN  29 H


 


Creatinine  1.49 H


 


Est GFR (African Amer)  58 L


 


Glucose  112 H


 


Calcium  9.1








                                        











  08/30/20 08/30/20 08/30/20





  00:23 00:23 03:37


 


Creatine Kinase  195 H   184 H


 


CK-MB (CK-2)   4.09 


 


Troponin I   < 0.012 


 


NT-Pro-B Natriuret Pep   














  08/31/20





  06:05


 


Creatine Kinase 


 


CK-MB (CK-2) 


 


Troponin I 


 


NT-Pro-B Natriuret Pep  1540 H











Impressions: 


                                        





Lumbar Spine X-Ray  08/30/20 00:05


IMPRESSION:


No acute bony injury is identified.


 








Chest X-Ray  09/02/20 00:00


IMPRESSION:  Cardiomegaly and low inspiratory lung volumes without a 

superimposed acute cardiopulmonary process.


 














Assessment and Plan





- Diagnosis


(1) Hyponatremia


Is this a current diagnosis for this admission?: Yes   


Plan: 


Sodium is stable.


Hold Lasix for today given bump in creatinine.  Continue to hold Aldactone which

such heavy doses very likely contributed to his presentation with hyponatremia 

and hyperkalemia.


Repeat BMP in the morning.








(2) Hyperammonemia


Is this a current diagnosis for this admission?: Yes   


Plan: 


Ammonia was elevated yesterday.  However this does not clinically correlate as 

he is not having any encephalopathy at this time.


He does state that he takes lactulose 20 g daily at home even though he was 

prescribed for 4 times a day.  He states that typically 1 dose daily is enough 

to achieve 4 bowel movements.


Continue lactulose at 20 g daily.  Monitor for dehydration.








(3) Acute kidney injury


Is this a current diagnosis for this admission?: Yes   


Plan: 


Renal function showed significant improvement throughout hospitalization but 

seems to have been having mild worsening today.  Creatinine up to 1.4 today.


Has been diuresing a whole lot.


We will hold Lasix and give 700 cc of IV fluid today.  Avoid dehydration.  

Monitor urine output.  Repeat BMP in the morning.








(4) Chronic liver disease and cirrhosis


Is this a current diagnosis for this admission?: Yes   


Plan: 


Hypervolemic with fluid overload state secondary to cirrhosis.


Lasix


Aldactone discontinued


Lactulose.


Seems to be on rifaximin at home as well.


Avoid hepatotoxic meds.


We will need to be established with a gastroenterologist or hepatologist upon 

discharge within the area.








(5) Acute on chronic diastolic CHF (congestive heart failure)


Is this a current diagnosis for this admission?: Yes   


Plan: 


TTE done this admission showed EF 60 to 65% with grade 2 diastolic dysfunction 

and moderate pulmonary hypertension with RVSP 50 to 55 mmHg


Currently improved with diuresis.  It is possible that his pulmonary 

hypertension may also be attributable to his cirrhosis [portopulmonary] and he 

will probably benefit from further evaluation in the future as outpatient.








(6) Anemia


Qualifiers: 


   Anemia type: iron deficiency   Iron deficiency anemia type: unspecified iron 

deficiency   Qualified Code(s): D50.9 - Iron deficiency anemia, unspecified   


Is this a current diagnosis for this admission?: Yes   


Plan: 


Received Venofer.  Continue ferrous sulfate.  CBC stable.








(7) Thrombocytopenia


Is this a current diagnosis for this admission?: Yes   


Plan: 


Stable.  Secondary to cirrhosis.








(8) Debility


Is this a current diagnosis for this admission?: Yes   


Plan: 


Awaiting placement at SNF.  COVID-19 test result is negative x2.








- Time


Anticipated Discharge Disposition: Skilled Nursing Facility


Anticipated Discharge Timeframe: within 24 hours

## 2020-09-11 VITALS — SYSTOLIC BLOOD PRESSURE: 125 MMHG | DIASTOLIC BLOOD PRESSURE: 55 MMHG

## 2020-09-11 LAB
ANION GAP SERPL CALC-SCNC: 7 MMOL/L (ref 5–19)
BUN SERPL-MCNC: 25 MG/DL (ref 7–20)
CALCIUM: 8.7 MG/DL (ref 8.4–10.2)
CHLORIDE SERPL-SCNC: 95 MMOL/L (ref 98–107)
CO2 SERPL-SCNC: 28 MMOL/L (ref 22–30)
GLUCOSE SERPL-MCNC: 111 MG/DL (ref 75–110)
POTASSIUM SERPL-SCNC: 3.8 MMOL/L (ref 3.6–5)

## 2020-09-11 RX ADMIN — GABAPENTIN SCH MG: 400 CAPSULE ORAL at 05:34

## 2020-09-11 RX ADMIN — DOCUSATE SODIUM SCH MG: 100 CAPSULE, LIQUID FILLED ORAL at 10:06

## 2020-09-11 RX ADMIN — Medication PRN EACH: at 10:20

## 2020-09-11 RX ADMIN — INSULIN HUMAN SCH: 100 INJECTION, SOLUTION PARENTERAL at 09:56

## 2020-09-11 RX ADMIN — CARVEDILOL SCH MG: 12.5 TABLET, FILM COATED ORAL at 10:06

## 2020-09-11 RX ADMIN — Medication SCH ML: at 14:29

## 2020-09-11 RX ADMIN — LIDOCAINE SCH PATCH: 50 PATCH CUTANEOUS at 10:07

## 2020-09-11 RX ADMIN — COLCHICINE SCH MG: 0.6 TABLET, FILM COATED ORAL at 10:06

## 2020-09-11 RX ADMIN — FAMOTIDINE SCH MG: 20 TABLET, FILM COATED ORAL at 10:06

## 2020-09-11 RX ADMIN — GABAPENTIN SCH MG: 400 CAPSULE ORAL at 14:29

## 2020-09-11 RX ADMIN — FERROUS SULFATE TAB 325 MG (65 MG ELEMENTAL FE) SCH MG: 325 (65 FE) TAB at 10:06

## 2020-09-11 RX ADMIN — Medication SCH ML: at 05:35

## 2020-09-11 RX ADMIN — LACTULOSE SCH GM: 20 SOLUTION ORAL at 10:06

## 2020-09-11 RX ADMIN — PANTOPRAZOLE SODIUM SCH MG: 40 TABLET, DELAYED RELEASE ORAL at 05:34

## 2020-09-11 RX ADMIN — Medication PRN EACH: at 01:57

## 2020-09-11 NOTE — PDOC DISCHARGE SUMMARY
Impression





- Admit/DC Date/PCP


Admission Date/Primary Care Provider: 


  08/30/20 14:50





  





Discharge Date: 09/11/20





- Discharge Diagnosis


(1) Hyponatremia


Is this a current diagnosis for this admission?: Yes   





(2) Hyperkalemia


Is this a current diagnosis for this admission?: Yes   





(3) Hyperammonemia


Is this a current diagnosis for this admission?: Yes   





(4) Acute kidney injury


Is this a current diagnosis for this admission?: Yes   





(5) Chronic liver disease and cirrhosis


Is this a current diagnosis for this admission?: Yes   





(6) Acute on chronic diastolic CHF (congestive heart failure)


Is this a current diagnosis for this admission?: Yes   





(7) Anemia


Is this a current diagnosis for this admission?: Yes   





(8) Thrombocytopenia


Is this a current diagnosis for this admission?: Yes   





(9) Debility


Is this a current diagnosis for this admission?: Yes   





- Additional Information


Resuscitation Status: Full Code


Referrals: 


TRINY RICH MD [ACTIVE STAFF] - 


ISABELLA MCKINNON MD [ACTIVE STAFF] - 


Home Medications: 








Carvedilol [Coreg 12.5 mg Tablet] 12.5 mg PO Q12 08/30/20 


Gabapentin [Neurontin] 800 mg PO TID 08/30/20 


Multivitamin [Tab-A-Obed] 1 each PO DAILY 08/30/20 


Omeprazole 40 mg PO DAILY 08/30/20 


Ondansetron [Zofran Odt 4 mg Tablet] 4 mg PO BIDP PRN 08/30/20 


Rifaximin [Xifaxan 550 mg Tablet] 550 mg PO QHS 08/30/20 


Benzonatate [Tessalon Perles 100 mg Capsule] 100 mg PO Q8HP PRN  capsule 

09/11/20 


Colchicine [Colcrys 0.6 mg Tablet] 1 tab PO DAILY #0 09/11/20 


Furosemide [Lasix 20 mg Tablet] 40 mg PO DAILY #0 09/11/20 


Lactulose [Cephulac Syrup 20 gm/30 ml Udcup] 20 gm PO DAILY  udc 09/11/20 


Lidocaine [Lidoderm 5% (700 mg) Transdermal Patch] 1 patch TP DAILY  adh..patch 

09/11/20 











History of Present Illiness


History of Present Illness: 


According to admitting provider: 


The patient presents with generalized weakness and obtundation.


He fell in his home yesterday as well.


He lives with his sister.


The patient has a history significant for CHF and Cirrhosis. When he had 

routinelabs doen they showed severe hyponatremaiand hyperkalemia.


The patient was found to have an intial Na+ of 114 and potassium of 7.6.


His creatinine is 2.24. (for GFR of 30).


The patient has additional history of Atrial fib but denies being on antiembolic

treatment.


At this time the patient is conversant with me , speaks clearly and has decent 

comprehension.


His serum osmolality is 252.


His urien osmolality is 297 and urine Na 34.


The patient has fairly massive peripheral edema.


He has been on large dose of Spironolactone and lasix at home.


His ammonia levl is about 80.


he appears to be hemodynamically stable








Hospital Course


Hospital Course: 


Patient was admitted to the hospital on 8/30/2020.  He had presented following a

fall was noted to have some confusion.  Lumbar x-ray and chest x-ray were 

unremarkable.  Blood work revealed leukocytosis, thrombocytopenia, severe 

hyponatremia of 114, hyperkalemia of 7.4, acute renal failure with creatinine of

2.24.  Patient is notably has history of cirrhosis thought to be secondary to 

alcoholism.  He has been following with a hepatologist out of town but has 

recently moved.


His electrolyte abnormalities were thought to be secondary to heavy doses of 

Aldactone for which she was taking 100 mg twice a day.  His diuretics were 

initially stopped and he was admitted to the ICU and received treatment for his 

electrolyte abnormalities.  He improved and was later downgraded to the medical 

floor.


On the medical floor he was diuresed only with Lasix.  He was seen by n

ephrologist who recommended diuresis with 40 mg of Lasix 3 times a day. He was 

on this for several days then diuresed very effectively maintaining net negative

balance for several days.  His kidney function also improved significantly with 

diuresis and last creatinine measured was 1.3 today.  Of note his Aldactone has 

been discontinued due to his presentation with severe hyponatremia and hyperkale

aurora.  If the Aldactone is to be resumed in the future date, should be resumed 

only at much smaller doses.


His Lasix did have to be held yesterday due to mild creatinine bump.  However 

can be resumed at this time.  Not currently on only daily dosing.  Can uptitrate

if patient starts to get increased swelling.


His last sodium measured was 130 today and it has been stable at this for the 

past 6 days now.


His thrombocytopenia is stable and is secondary to his cirrhosis.





As he has moved to [facility] recently, he will need to follow-up with 

hepatology/gastroenterology regarding his cirrhosis.


Please ensure to repeat a basic metabolic panel to evaluate his electrolytes in 

2 weeks.





Physical Exam


Vital Signs: 


                                        











Temp Pulse Resp BP Pulse Ox


 


 97.9 F   73   18   125/55 L  94 


 


 09/11/20 08:32  09/11/20 08:32  09/11/20 08:32  09/11/20 08:32  09/11/20 08:32








                                 Intake & Output











 09/10/20 09/11/20 09/12/20





 06:59 06:59 06:59


 


Intake Total 644 2210 


 


Output Total 700 950 


 


Balance -56 1260 


 


Weight 127.6 kg 130.4 kg 











General appearance: PRESENT: no acute distress, cooperative


Neck exam: ABSENT: JVD


Respiratory exam: PRESENT: unlabored.  ABSENT: clear to auscultation karma, 

tachypnea, wheezes


Cardiovascular exam: PRESENT: RRR, +S1, +S2.  ABSENT: tachycardia


GI/Abdominal exam: PRESENT: soft.  ABSENT: tenderness


Neurological exam: PRESENT: alert, awake, oriented to person, oriented to place,

oriented to time


Psychiatric exam: ABSENT: agitated, anxious


Focused psych exam: ABSENT: pressured speech


Skin exam: ABSENT: skin tears





Results


Laboratory Results: 


                                        











WBC  6.4 10^3/uL (4.0-10.5)   09/09/20  06:30    


 


RBC  2.82 10^6/uL (4.35-5.55)  L  09/09/20  06:30    


 


Hgb  9.7 g/dL (13.5-17.0)  L  09/09/20  06:30    


 


Hct  27.2 % (37.9-51.0)  L  09/09/20  06:30    


 


MCV  97 fl (80-97)   09/09/20  06:30    


 


MCH  34.4 pg (27.0-33.4)  H  09/09/20  06:30    


 


MCHC  35.6 g/dL (32.0-36.0)   09/09/20  06:30    


 


RDW  15.7 % (11.5-14.0)  H  09/09/20  06:30    


 


Plt Count  90 10^3/uL (150-450)  L  09/09/20  06:30    


 


Lymph % (Auto)  8.0 % (13-45)  L  09/06/20  10:25    


 


Mono % (Auto)  17.9 % (3-13)  H  09/06/20  10:25    


 


Eos % (Auto)  1.0 % (0-6)   09/06/20  10:25    


 


Baso % (Auto)  0.5 % (0-2)   09/06/20  10:25    


 


Reticulocyte #  0.063 10^6/uL (0.028-0.122)   09/03/20  10:35    


 


Absolute Neuts (auto)  8.2 10^3/uL (1.7-8.2)   09/06/20  10:25    


 


Absolute Lymphs (auto)  0.9 10^3/uL (0.5-4.7)   09/06/20  10:25    


 


Absolute Monos (auto)  2.0 10^3/uL (0.1-1.4)  H  09/06/20  10:25    


 


Absolute Eos (auto)  0.1 10^3/uL (0.0-0.6)   09/06/20  10:25    


 


Absolute Basos (auto)  0.1 10^3/uL (0.0-0.2)   09/06/20  10:25    


 


Total Counted  100   09/05/20  11:42    


 


Seg Neutrophils %  72.6 % (42-78)   09/06/20  10:25    


 


Seg Neuts % (Manual)  78 % (42-78)   09/05/20  11:42    


 


Band Neutrophils %  1 % (3-5)  L  09/05/20  11:42    


 


Lymphocytes % (Manual)  5 % (13-45)  L  09/05/20  11:42    


 


Atypical Lymphs %  7 % (0)   09/05/20  11:42    


 


Monocytes % (Manual)  9 % (3-13)   09/05/20  11:42    


 


Eosinophils % (Manual)  0 % (0-6)   09/05/20  11:42    


 


Basophils % (Manual)  0 % (0-2)   09/05/20  11:42    


 


Metamyelocytes %  3 % (0-1)  H  09/03/20  05:59    


 


Promyelocytes %  1 % (0)  H  09/03/20  05:59    


 


Abs Neuts (Manual)  9.6 10^3/uL (1.7-8.2)  H  09/05/20  11:42    


 


Abs Lymphs (Manual)  1.5 10^3/uL (0.5-4.7)   09/05/20  11:42    


 


Abs Monocytes (Manual)  1.1 10^3/uL (0.1-1.4)   09/05/20  11:42    


 


Absolute Eos (Manual)  0.0 10^3/uL (0.0-0.6)   09/05/20  11:42    


 


Abs Basophils (Manual)  0.0 10^3/uL (0.0-0.2)   09/05/20  11:42    


 


Toxic Granulation  1+   09/05/20  11:42    


 


Platelet Estimate  Cancelled   09/05/20  10:17    


 


Platelet Comment     09/05/20  11:42    


 


Polychromasia  1+   09/05/20  11:42    


 


Poikilocytosis  SLIGHT   09/05/20  11:42    


 


Basophilic Stippling  PRESENT   09/05/20  11:42    


 


Anisocytosis  1+   09/05/20  11:42    


 


Macrocytosis  SLIGHT   09/03/20  05:59    


 


Tear Drop Cells  SLIGHT   09/05/20  11:42    


 


Stomatocytes  SLIGHT   09/03/20  05:59    


 


Schistocytes  SLIGHT   08/30/20  00:23    


 


Retic Count (auto)  2.71 % (0.66-2.85)   09/03/20  10:35    


 


Carbonic Acid  1.14 mmol/L (1.05-1.35)   08/30/20  19:09    


 


HCO3/H2CO3 Ratio  17:1   08/30/20  19:09    


 


ABG pH  7.34  (7.35-7.45)  L  08/30/20  19:09    


 


ABG pCO2  37.8 mmHg (35-45)   08/30/20  19:09    


 


ABG pO2  97.5 mmHg ()   08/30/20  19:09    


 


ABG HCO3  20.1 mmol/L (20-24)   08/30/20  19:09    


 


ABG Total CO2  21.3 mmol/L (23-27)  L  08/30/20  19:09    


 


ABG O2 Saturation  97.1 % (94-98)   08/30/20  19:09    


 


ABG Base Excess  -5.1 mmol/L  08/30/20  19:09    


 


FiO2  3 L   08/30/20  19:09    


 


Sodium  130.3 mmol/L (137-145)  L  09/11/20  06:29    


 


Potassium  3.8 mmol/L (3.6-5.0)   09/11/20  06:29    


 


Chloride  95 mmol/L ()  L  09/11/20  06:29    


 


Carbon Dioxide  28 mmol/L (22-30)   09/11/20  06:29    


 


Anion Gap  7  (5-19)   09/11/20  06:29    


 


BUN  25 mg/dL (7-20)  H  09/11/20  06:29    


 


Creatinine  1.32 mg/dL (0.52-1.25)  H  09/11/20  06:29    


 


Est GFR ( Amer)  > 60  (>60)   09/11/20  06:29    


 


Est GFR (MDRD) Non-Af  55  (>60)  L  09/11/20  06:29    


 


Glucose  111 mg/dL ()  H  09/11/20  06:29    


 


POC Glucose  104 mg/dL ()   09/11/20  08:34    


 


Serum Osmolality  252 mOsm/kg (275-301)  L  08/30/20  03:37    


 


Lactic Acid  1.5 mmol/L (0.7-2.1)   08/30/20  22:02    


 


Calcium  8.7 mg/dL (8.4-10.2)   09/11/20  06:29    


 


Phosphorus  5.4 mg/dL (2.5-4.5)  H  08/31/20  06:05    


 


Magnesium  1.8 mg/dL (1.6-2.3)   09/03/20  05:59    


 


Iron  < 10.1 ug/dL ()  L  09/03/20  10:35    


 


TIBC  254 ug/dL (250-450)   09/03/20  10:35    


 


Iron Saturation  UNABLE TO CALCULATE % (20% - 50%)   09/03/20  10:35    


 


Ferritin  128.00 ng/mL (17.9-464.0)   09/03/20  10:35    


 


Total Bilirubin  1.8 mg/dL (0.2-1.3)  H  09/03/20  05:59    


 


Direct Bilirubin  1.2 mg/dL (0.0-0.4)  H  09/03/20  05:59    


 


Neonat Total Bilirubin  Not Reportable   09/03/20  05:59    


 


Neonat Direct Bilirubin  Not Reportable   09/03/20  05:59    


 


Neonat Indirect Bili  Not Reportable   09/03/20  05:59    


 


AST  80 U/L (17-59)  H  09/03/20  05:59    


 


ALT  19 U/L (<50)   09/03/20  05:59    


 


Alkaline Phosphatase  117 U/L ()   09/03/20  05:59    


 


Ammonia  71.1 umol/L (9-33)  H  09/07/20  09:30    


 


Creatine Kinase  184 U/L ()  H  08/30/20  03:37    


 


CK-MB (CK-2)  4.09 ng/mL (<4.55)   08/30/20  00:23    


 


Troponin I  < 0.012 ng/mL  08/30/20  00:23    


 


NT-Pro-B Natriuret Pep  1540 pg/mL (<125)  H  08/31/20  06:05    


 


Total Protein  5.6 g/dL (6.3-8.2)  L  09/03/20  05:59    


 


Albumin  2.7 g/dL (3.5-5.0)  L  09/03/20  05:59    


 


Triglycerides  105 mg/dL (<150)   08/31/20  06:05    


 


Cholesterol  124.27 mg/dL (0-200)   08/31/20  06:05    


 


LDL Cholesterol Direct  62 mg/dL (<100)   08/31/20  06:05    


 


VLDL Cholesterol  21.0 mg/dL (10-31)   08/31/20  06:05    


 


HDL Cholesterol  42 mg/dL (>40)   08/31/20  06:05    


 


Vitamin B12  > 1000.0 pg/mL (239-931)  H  09/03/20  10:35    


 


Folate  18.10 ng/mL (>2.76)   09/03/20  10:35    


 


TSH  9.67 uIU/mL (0.47-4.68)  H  08/31/20  06:05    


 


Free T4  1.47 ng/dL (0.78-2.19)   08/31/20  06:05    


 


Urine Color  YELLOW   08/30/20  00:03    


 


Urine Appearance  CLEAR   08/30/20  00:03    


 


Urine pH  6.0  (5.0-9.0)   08/30/20  00:03    


 


Ur Specific Gravity  1.009   08/30/20  00:03    


 


Urine Protein  NEGATIVE mg/dL (NEGATIVE)   08/30/20  00:03    


 


Urine Glucose (UA)  NEGATIVE mg/dL (NEGATIVE)   08/30/20  00:03    


 


Urine Ketones  NEGATIVE mg/dL (NEGATIVE)   08/30/20  00:03    


 


Urine Blood  NEGATIVE  (NEGATIVE)   08/30/20  00:03    


 


Urine Nitrite  NEGATIVE  (NEGATIVE)   08/30/20  00:03    


 


Urine Bilirubin  NEGATIVE  (NEGATIVE)   08/30/20  00:03    


 


Urine Urobilinogen  NEGATIVE mg/dL (<2.0)   08/30/20  00:03    


 


Ur Leukocyte Esterase  NEGATIVE  (NEGATIVE)   08/30/20  00:03    


 


Urine WBC (Auto)  0 /HPF  08/30/20  00:03    


 


U Hyaline Cast (Auto)  11 /LPF  08/30/20  00:03    


 


Squamous Epi Cells Auto  1 /HPF  08/30/20  00:03    


 


Urine Mucus (Auto)  RARE /LPF  08/30/20  00:03    


 


Urine Osmolality  297 mOsm/kg (300-900)  L  08/30/20  08:17    


 


Urine Sodium  34 mmol/L (30-90)   08/30/20  08:17    


 


Urine Ascorbic Acid  NEGATIVE  (NEGATIVE)   08/30/20  00:03    


 


COVID-19 Source  NASOPHARYNGEAL   09/08/20  13:00    


 


COVID-19 ([facility])  NOT DETECTED   09/08/20  13:00    


 


Slides for Path Review  Cancelled   09/05/20  10:17    








                                        











  08/30/20 08/31/20





  00:23 06:05


 


CK-MB (CK-2)  4.09 


 


Troponin I  < 0.012 


 


NT-Pro-B Natriuret Pep   1540 H











Impressions: 


                                        





Lumbar Spine X-Ray  08/30/20 00:05


IMPRESSION:


No acute bony injury is identified.


 








Chest X-Ray  08/30/20 08:14


IMPRESSION:  NO ACUTE RADIOGRAPHIC FINDING IN THE CHEST.


 








Chest X-Ray  08/30/20 14:47


IMPRESSION:  Hypoventilated exam without evidence of acute pulmonary process.


 








Chest X-Ray  09/02/20 00:00


IMPRESSION:  Cardiomegaly and low inspiratory lung volumes without a 

superimposed acute cardiopulmonary process.


 














Plan


Goals: 


Vandana Sutherland.


Time Spent: Greater than 30 Minutes





Stroke


Is this a Stroke Patient?: No





Acute Heart Failure


Is this a Heart Failure Patient?: Yes


Documentation of LVEF assessment?: Yes


LVEF: LVEF Greater Than 40%


Anticoagulant Therapy: N/A

## 2021-01-07 ENCOUNTER — HOSPITAL ENCOUNTER (INPATIENT)
Dept: HOSPITAL 62 - ER | Age: 61
LOS: 9 days | Discharge: HOME HEALTH | DRG: 377 | End: 2021-01-16
Attending: INTERNAL MEDICINE | Admitting: INTERNAL MEDICINE
Payer: MEDICARE

## 2021-01-07 DIAGNOSIS — L03.116: ICD-10-CM

## 2021-01-07 DIAGNOSIS — Z60.2: ICD-10-CM

## 2021-01-07 DIAGNOSIS — L03.115: ICD-10-CM

## 2021-01-07 DIAGNOSIS — Z20.822: ICD-10-CM

## 2021-01-07 DIAGNOSIS — D50.9: ICD-10-CM

## 2021-01-07 DIAGNOSIS — K70.30: ICD-10-CM

## 2021-01-07 DIAGNOSIS — N18.32: ICD-10-CM

## 2021-01-07 DIAGNOSIS — Z66: ICD-10-CM

## 2021-01-07 DIAGNOSIS — G93.41: ICD-10-CM

## 2021-01-07 DIAGNOSIS — K72.90: ICD-10-CM

## 2021-01-07 DIAGNOSIS — M10.9: ICD-10-CM

## 2021-01-07 DIAGNOSIS — E87.1: ICD-10-CM

## 2021-01-07 DIAGNOSIS — I50.32: ICD-10-CM

## 2021-01-07 DIAGNOSIS — D62: ICD-10-CM

## 2021-01-07 DIAGNOSIS — K57.31: Primary | ICD-10-CM

## 2021-01-07 DIAGNOSIS — E72.20: ICD-10-CM

## 2021-01-07 DIAGNOSIS — D69.6: ICD-10-CM

## 2021-01-07 DIAGNOSIS — E83.42: ICD-10-CM

## 2021-01-07 DIAGNOSIS — Z79.899: ICD-10-CM

## 2021-01-07 DIAGNOSIS — F10.20: ICD-10-CM

## 2021-01-07 DIAGNOSIS — K64.9: ICD-10-CM

## 2021-01-07 DIAGNOSIS — N17.9: ICD-10-CM

## 2021-01-07 DIAGNOSIS — E87.5: ICD-10-CM

## 2021-01-07 LAB
ADD MANUAL DIFF: NO
ALBUMIN SERPL-MCNC: 3.1 G/DL (ref 3.5–5)
ALP SERPL-CCNC: 187 U/L (ref 38–126)
ANION GAP SERPL CALC-SCNC: 6 MMOL/L (ref 5–19)
APPEARANCE UR: CLEAR
APTT PPP: YELLOW S
AST SERPL-CCNC: 54 U/L (ref 17–59)
BASOPHILS # BLD AUTO: 0.1 10^3/UL (ref 0–0.2)
BASOPHILS NFR BLD AUTO: 1.4 % (ref 0–2)
BILIRUB DIRECT SERPL-MCNC: 0.7 MG/DL (ref 0–0.4)
BILIRUB SERPL-MCNC: 1.4 MG/DL (ref 0.2–1.3)
BILIRUB UR QL STRIP: NEGATIVE
BUN SERPL-MCNC: 61 MG/DL (ref 7–20)
CALCIUM: 8.9 MG/DL (ref 8.4–10.2)
CHLORIDE SERPL-SCNC: 97 MMOL/L (ref 98–107)
CK MB SERPL-MCNC: 2.38 NG/ML (ref ?–4.55)
CK SERPL-CCNC: 116 U/L (ref 55–170)
CO2 SERPL-SCNC: 24 MMOL/L (ref 22–30)
EOSINOPHIL # BLD AUTO: 0.1 10^3/UL (ref 0–0.6)
EOSINOPHIL NFR BLD AUTO: 2 % (ref 0–6)
ERYTHROCYTE [DISTWIDTH] IN BLOOD BY AUTOMATED COUNT: 17.3 % (ref 11.5–14)
GLUCOSE SERPL-MCNC: 136 MG/DL (ref 75–110)
GLUCOSE UR STRIP-MCNC: NEGATIVE MG/DL
HCT VFR BLD CALC: 20 % (ref 37.9–51)
HGB BLD-MCNC: 6.8 G/DL (ref 13.5–17)
KETONES UR STRIP-MCNC: NEGATIVE MG/DL
LYMPHOCYTES # BLD AUTO: 0.9 10^3/UL (ref 0.5–4.7)
LYMPHOCYTES NFR BLD AUTO: 11.6 % (ref 13–45)
MCH RBC QN AUTO: 33.8 PG (ref 27–33.4)
MCHC RBC AUTO-ENTMCNC: 34.1 G/DL (ref 32–36)
MCV RBC AUTO: 99 FL (ref 80–97)
MONOCYTES # BLD AUTO: 1 10^3/UL (ref 0.1–1.4)
MONOCYTES NFR BLD AUTO: 13.9 % (ref 3–13)
NEUTROPHILS # BLD AUTO: 5.4 10^3/UL (ref 1.7–8.2)
NEUTS SEG NFR BLD AUTO: 71.1 % (ref 42–78)
NITRITE UR QL STRIP: NEGATIVE
PH UR STRIP: 6 [PH] (ref 5–9)
POTASSIUM SERPL-SCNC: 5.7 MMOL/L (ref 3.6–5)
PROT SERPL-MCNC: 6.5 G/DL (ref 6.3–8.2)
PROT UR STRIP-MCNC: NEGATIVE MG/DL
RBC # BLD AUTO: 2.02 10^6/UL (ref 4.35–5.55)
SP GR UR STRIP: 1.01
TOTAL CELLS COUNTED % (AUTO): 100 %
TROPONIN I SERPL-MCNC: < 0.012 NG/ML
UROBILINOGEN UR-MCNC: NEGATIVE MG/DL (ref ?–2)
WBC # BLD AUTO: 7.5 10^3/UL (ref 4–10.5)

## 2021-01-07 PROCEDURE — 85027 COMPLETE CBC AUTOMATED: CPT

## 2021-01-07 PROCEDURE — 45381 COLONOSCOPY SUBMUCOUS NJX: CPT

## 2021-01-07 PROCEDURE — C9113 INJ PANTOPRAZOLE SODIUM, VIA: HCPCS

## 2021-01-07 PROCEDURE — 36430 TRANSFUSION BLD/BLD COMPNT: CPT

## 2021-01-07 PROCEDURE — 86900 BLOOD TYPING SEROLOGIC ABO: CPT

## 2021-01-07 PROCEDURE — 93010 ELECTROCARDIOGRAM REPORT: CPT

## 2021-01-07 PROCEDURE — 86901 BLOOD TYPING SEROLOGIC RH(D): CPT

## 2021-01-07 PROCEDURE — 36415 COLL VENOUS BLD VENIPUNCTURE: CPT

## 2021-01-07 PROCEDURE — 84484 ASSAY OF TROPONIN QUANT: CPT

## 2021-01-07 PROCEDURE — 83550 IRON BINDING TEST: CPT

## 2021-01-07 PROCEDURE — 82962 GLUCOSE BLOOD TEST: CPT

## 2021-01-07 PROCEDURE — 82550 ASSAY OF CK (CPK): CPT

## 2021-01-07 PROCEDURE — 93005 ELECTROCARDIOGRAM TRACING: CPT

## 2021-01-07 PROCEDURE — 82270 OCCULT BLOOD FECES: CPT

## 2021-01-07 PROCEDURE — 86850 RBC ANTIBODY SCREEN: CPT

## 2021-01-07 PROCEDURE — S0119 ONDANSETRON 4 MG: HCPCS

## 2021-01-07 PROCEDURE — 82728 ASSAY OF FERRITIN: CPT

## 2021-01-07 PROCEDURE — 00813 ANES UPR LWR GI NDSC PX: CPT

## 2021-01-07 PROCEDURE — 82803 BLOOD GASES ANY COMBINATION: CPT

## 2021-01-07 PROCEDURE — 85025 COMPLETE CBC W/AUTO DIFF WBC: CPT

## 2021-01-07 PROCEDURE — 80048 BASIC METABOLIC PNL TOTAL CA: CPT

## 2021-01-07 PROCEDURE — 30233N1 TRANSFUSION OF NONAUTOLOGOUS RED BLOOD CELLS INTO PERIPHERAL VEIN, PERCUTANEOUS APPROACH: ICD-10-PCS

## 2021-01-07 PROCEDURE — 71045 X-RAY EXAM CHEST 1 VIEW: CPT

## 2021-01-07 PROCEDURE — 43235 EGD DIAGNOSTIC BRUSH WASH: CPT

## 2021-01-07 PROCEDURE — 82553 CREATINE MB FRACTION: CPT

## 2021-01-07 PROCEDURE — C9803 HOPD COVID-19 SPEC COLLECT: HCPCS

## 2021-01-07 PROCEDURE — 86920 COMPATIBILITY TEST SPIN: CPT

## 2021-01-07 PROCEDURE — P9016 RBC LEUKOCYTES REDUCED: HCPCS

## 2021-01-07 PROCEDURE — 36600 WITHDRAWAL OF ARTERIAL BLOOD: CPT

## 2021-01-07 PROCEDURE — 80053 COMPREHEN METABOLIC PANEL: CPT

## 2021-01-07 PROCEDURE — 81001 URINALYSIS AUTO W/SCOPE: CPT

## 2021-01-07 PROCEDURE — 76705 ECHO EXAM OF ABDOMEN: CPT

## 2021-01-07 PROCEDURE — 82746 ASSAY OF FOLIC ACID SERUM: CPT

## 2021-01-07 PROCEDURE — 83735 ASSAY OF MAGNESIUM: CPT

## 2021-01-07 PROCEDURE — 96374 THER/PROPH/DIAG INJ IV PUSH: CPT

## 2021-01-07 PROCEDURE — 99140 ANES COMP EMERGENCY COND: CPT

## 2021-01-07 PROCEDURE — 83540 ASSAY OF IRON: CPT

## 2021-01-07 PROCEDURE — 99285 EMERGENCY DEPT VISIT HI MDM: CPT

## 2021-01-07 PROCEDURE — 83880 ASSAY OF NATRIURETIC PEPTIDE: CPT

## 2021-01-07 PROCEDURE — 82140 ASSAY OF AMMONIA: CPT

## 2021-01-07 PROCEDURE — 82607 VITAMIN B-12: CPT

## 2021-01-07 PROCEDURE — 84100 ASSAY OF PHOSPHORUS: CPT

## 2021-01-07 PROCEDURE — 83036 HEMOGLOBIN GLYCOSYLATED A1C: CPT

## 2021-01-07 SDOH — SOCIAL STABILITY - SOCIAL INSECURITY: PROBLEMS RELATED TO LIVING ALONE: Z60.2

## 2021-01-07 NOTE — EKG REPORT
SEVERITY:- ABNORMAL ECG -

ECTOPIC ATRIAL RHYTHM

PROBABLE INFERIOR INFARCT, OLD

LATERAL LEADS ARE ALSO INVOLVED

:

Confirmed by: Cam Lindsey 07-Jan-2021 23:38:54

## 2021-01-07 NOTE — RADIOLOGY REPORT (SQ)
EXAM DESCRIPTION:  CHEST SINGLE VIEW



IMAGES COMPLETED DATE/TIME:  1/7/2021 4:17 pm



REASON FOR STUDY:  sob



COMPARISON:  9/2/2020



EXAM PARAMETERS:  NUMBER OF VIEWS: One view.

TECHNIQUE: Single frontal radiographic view of the chest acquired.

RADIATION DOSE: NA

LIMITATIONS: None.



FINDINGS:  LUNGS AND PLEURA: No opacities, masses or pneumothorax. No pleural effusion.

MEDIASTINUM AND HILAR STRUCTURES: No masses.  Contour normal.

HEART AND VASCULAR STRUCTURES: There is moderate cardiomegaly.  Mild indistinctness of the pulmonary 
vasculature.

BONES: No acute findings.

HARDWARE: None in the chest.

OTHER: No other significant finding.



IMPRESSION:  Moderate cardiomegaly with mild pulmonary edema.  No focal consolidation.



TECHNICAL DOCUMENTATION:  JOB ID:  [id number]

 2011 [id number]- All Rights Reserved



Reading location - IP/workstation name: [email or web address]

## 2021-01-08 LAB
ADD MANUAL DIFF: NO
ADD MANUAL DIFF: NO
ANION GAP SERPL CALC-SCNC: 7 MMOL/L (ref 5–19)
BASOPHILS # BLD AUTO: 0 10^3/UL (ref 0–0.2)
BASOPHILS # BLD AUTO: 0.1 10^3/UL (ref 0–0.2)
BASOPHILS NFR BLD AUTO: 0.7 % (ref 0–2)
BASOPHILS NFR BLD AUTO: 0.9 % (ref 0–2)
BUN SERPL-MCNC: 63 MG/DL (ref 7–20)
CALCIUM: 8.9 MG/DL (ref 8.4–10.2)
CHLORIDE SERPL-SCNC: 98 MMOL/L (ref 98–107)
CO2 SERPL-SCNC: 23 MMOL/L (ref 22–30)
EOSINOPHIL # BLD AUTO: 0.2 10^3/UL (ref 0–0.6)
EOSINOPHIL # BLD AUTO: 0.2 10^3/UL (ref 0–0.6)
EOSINOPHIL NFR BLD AUTO: 2.4 % (ref 0–6)
EOSINOPHIL NFR BLD AUTO: 2.9 % (ref 0–6)
ERYTHROCYTE [DISTWIDTH] IN BLOOD BY AUTOMATED COUNT: 18.6 % (ref 11.5–14)
ERYTHROCYTE [DISTWIDTH] IN BLOOD BY AUTOMATED COUNT: 20.8 % (ref 11.5–14)
FERRITIN SERPL-MCNC: 29.6 NG/ML (ref 17.9–464)
FOLATE SERPL-MCNC: 19.6 NG/ML (ref 2.76–?)
GLUCOSE SERPL-MCNC: 113 MG/DL (ref 75–110)
HCT VFR BLD CALC: 20.8 % (ref 37.9–51)
HCT VFR BLD CALC: 23.4 % (ref 37.9–51)
HGB BLD-MCNC: 7 G/DL (ref 13.5–17)
HGB BLD-MCNC: 7.9 G/DL (ref 13.5–17)
IRON(TIBC): 88.5 UG/DL (ref 49–181)
LYMPHOCYTES # BLD AUTO: 1 10^3/UL (ref 0.5–4.7)
LYMPHOCYTES # BLD AUTO: 1 10^3/UL (ref 0.5–4.7)
LYMPHOCYTES NFR BLD AUTO: 13.9 % (ref 13–45)
LYMPHOCYTES NFR BLD AUTO: 15.8 % (ref 13–45)
MCH RBC QN AUTO: 31.7 PG (ref 27–33.4)
MCH RBC QN AUTO: 32.4 PG (ref 27–33.4)
MCHC RBC AUTO-ENTMCNC: 33.5 G/DL (ref 32–36)
MCHC RBC AUTO-ENTMCNC: 33.8 G/DL (ref 32–36)
MCV RBC AUTO: 94 FL (ref 80–97)
MCV RBC AUTO: 97 FL (ref 80–97)
MONOCYTES # BLD AUTO: 0.9 10^3/UL (ref 0.1–1.4)
MONOCYTES # BLD AUTO: 0.9 10^3/UL (ref 0.1–1.4)
MONOCYTES NFR BLD AUTO: 13.3 % (ref 3–13)
MONOCYTES NFR BLD AUTO: 13.4 % (ref 3–13)
NEUTROPHILS # BLD AUTO: 4.4 10^3/UL (ref 1.7–8.2)
NEUTROPHILS # BLD AUTO: 4.8 10^3/UL (ref 1.7–8.2)
NEUTS SEG NFR BLD AUTO: 67.3 % (ref 42–78)
NEUTS SEG NFR BLD AUTO: 69.4 % (ref 42–78)
PHOSPHATE SERPL-MCNC: 4.5 MG/DL (ref 2.5–4.5)
PLATELET # BLD: 76 10^3/UL (ref 150–450)
POTASSIUM SERPL-SCNC: 5.7 MMOL/L (ref 3.6–5)
RBC # BLD AUTO: 2.15 10^6/UL (ref 4.35–5.55)
RBC # BLD AUTO: 2.5 10^6/UL (ref 4.35–5.55)
TOTAL CELLS COUNTED % (AUTO): 100 %
TOTAL CELLS COUNTED % (AUTO): 100 %
WBC # BLD AUTO: 6.6 10^3/UL (ref 4–10.5)
WBC # BLD AUTO: 6.9 10^3/UL (ref 4–10.5)

## 2021-01-08 RX ADMIN — RIFAXIMIN SCH MG: 550 TABLET ORAL at 22:19

## 2021-01-08 RX ADMIN — HYDROCORTISONE ACETATE SCH: 25 SUPPOSITORY RECTAL at 10:20

## 2021-01-08 RX ADMIN — FUROSEMIDE SCH MG: 10 INJECTION, SOLUTION INTRAMUSCULAR; INTRAVENOUS at 10:19

## 2021-01-08 RX ADMIN — CARVEDILOL SCH MG: 12.5 TABLET, FILM COATED ORAL at 10:19

## 2021-01-08 RX ADMIN — LACTULOSE SCH GM: 20 SOLUTION ORAL at 10:19

## 2021-01-08 RX ADMIN — GABAPENTIN SCH MG: 300 CAPSULE ORAL at 10:19

## 2021-01-08 RX ADMIN — FUROSEMIDE SCH MG: 10 INJECTION, SOLUTION INTRAMUSCULAR; INTRAVENOUS at 18:08

## 2021-01-08 RX ADMIN — COLCHICINE SCH MG: 0.6 TABLET, FILM COATED ORAL at 10:19

## 2021-01-08 RX ADMIN — SODIUM CHLORIDE TAB 1 GM SCH GM: 1 TAB at 05:01

## 2021-01-08 RX ADMIN — SODIUM CHLORIDE TAB 1 GM SCH GM: 1 TAB at 18:08

## 2021-01-08 RX ADMIN — GABAPENTIN SCH: 300 CAPSULE ORAL at 18:04

## 2021-01-08 RX ADMIN — ONDANSETRON PRN MG: 4 TABLET, ORALLY DISINTEGRATING ORAL at 18:09

## 2021-01-08 RX ADMIN — MULTIVITAMIN TABLET SCH TAB: TABLET at 10:19

## 2021-01-08 RX ADMIN — CARVEDILOL SCH MG: 12.5 TABLET, FILM COATED ORAL at 22:19

## 2021-01-08 RX ADMIN — PANTOPRAZOLE SODIUM SCH MG: 40 INJECTION, POWDER, FOR SOLUTION INTRAVENOUS at 10:19

## 2021-01-08 RX ADMIN — LIDOCAINE SCH: 50 PATCH CUTANEOUS at 10:20

## 2021-01-08 RX ADMIN — GABAPENTIN SCH: 300 CAPSULE ORAL at 17:37

## 2021-01-08 RX ADMIN — PANTOPRAZOLE SODIUM SCH MG: 40 INJECTION, POWDER, FOR SOLUTION INTRAVENOUS at 01:11

## 2021-01-08 RX ADMIN — PANTOPRAZOLE SODIUM SCH MG: 40 INJECTION, POWDER, FOR SOLUTION INTRAVENOUS at 22:19

## 2021-01-08 RX ADMIN — Medication SCH TAB: at 10:19

## 2021-01-08 RX ADMIN — HYDROCORTISONE ACETATE SCH: 25 SUPPOSITORY RECTAL at 17:37

## 2021-01-08 NOTE — PDOC PROGRESS REPORT
Subjective


Date:: 01/08/21


Subjective:: 





Patient lying in bed comfortable has denies any further bleeding per rectum.


Reason For Visit: 


ACUTE HYPONATREMIA,CHRONIC LOWER GI BLEEDING,








Physical Exam


Vital Signs: 


                                        











Temp Pulse Resp BP Pulse Ox


 


 97.6 F   55 L  16   135/46 H  96 


 


 01/08/21 08:23  01/08/21 08:23  01/08/21 08:23  01/08/21 08:23  01/08/21 08:23








                                 Intake & Output











 01/07/21 01/08/21 01/09/21





 06:59 06:59 06:59


 


Intake Total  400 


 


Output Total  0 


 


Balance  400 


 


Weight  143.244 kg 











General appearance: PRESENT: no acute distress, morbidly obese


Head exam: PRESENT: normocephalic


Eye exam: PRESENT: EOMI


Ear exam: PRESENT: normal external ear exam


Mouth exam: PRESENT: moist


Teeth exam: PRESENT: poor dentation


Neck exam: PRESENT: full ROM


Respiratory exam: PRESENT: clear to auscultation karma


Cardiovascular exam: PRESENT: RRR


Breast: PRESENT: Normal


GI/Abdominal exam: PRESENT: soft


Rectal exam: PRESENT: deferred


Extremities exam: PRESENT: full ROM


Neurological exam: PRESENT: alert, awake, oriented to person, oriented to place


Psychiatric exam: PRESENT: appropriate affect


Skin exam: PRESENT: dry





Results


Laboratory Results: 


                                        





                                 01/08/21 07:36 





                                        











  01/07/21 01/07/21 01/07/21





  16:50 16:50 18:21


 


WBC  Cancelled  


 


RBC  Cancelled  


 


Hgb  Cancelled  


 


Hct  Cancelled  


 


MCV  Cancelled  


 


MCH  Cancelled  


 


MCHC  Cancelled  


 


RDW  Cancelled  


 


Plt Count  Cancelled  


 


Seg Neutrophils %  Cancelled  


 


Retic Count (auto)   


 


Absolute Retic   


 


Sodium   126.5 L 


 


Potassium   5.7 H 


 


Chloride   97 L 


 


Carbon Dioxide   24 


 


Anion Gap   6 


 


BUN   61 H 


 


Creatinine   2.41 H 


 


Est GFR ( Amer)   33 L 


 


Glucose   136 H 


 


Calcium   8.9 


 


Total Bilirubin   1.4 H 


 


AST   54 


 


Alkaline Phosphatase   187 H 


 


Total Protein   6.5 


 


Albumin   3.1 L 


 


Urine Color    YELLOW


 


Urine Appearance    CLEAR


 


Urine pH    6.0


 


Ur Specific Comfort    1.011


 


Urine Protein    NEGATIVE


 


Urine Glucose (UA)    NEGATIVE


 


Urine Ketones    NEGATIVE


 


Urine Blood    NEGATIVE


 


Urine Nitrite    NEGATIVE


 


Ur Leukocyte Esterase    NEGATIVE


 


Urine WBC (Auto)    2


 


Urine RBC (Auto)    0


 


Blood Type   


 


Antibody Screen   














  01/07/21 01/07/21 01/08/21





  18:30 18:30 07:36


 


WBC  7.5   Cancelled


 


RBC  2.02 L   Cancelled


 


Hgb  6.8 L   Cancelled


 


Hct  20.0 L   Cancelled


 


MCV  99 H   Cancelled


 


MCH  33.8 H   Cancelled


 


MCHC  34.1   Cancelled


 


RDW  17.3 H   Cancelled


 


Plt Count     Cancelled


 


Seg Neutrophils %  71.1   Cancelled


 


Retic Count (auto)    Cancelled


 


Absolute Retic    Cancelled


 


Sodium   


 


Potassium   


 


Chloride   


 


Carbon Dioxide   


 


Anion Gap   


 


BUN   


 


Creatinine   


 


Est GFR (African Amer)   


 


Glucose   


 


Calcium   


 


Total Bilirubin   


 


AST   


 


Alkaline Phosphatase   


 


Total Protein   


 


Albumin   


 


Urine Color   


 


Urine Appearance   


 


Urine pH   


 


Ur Specific Gravity   


 


Urine Protein   


 


Urine Glucose (UA)   


 


Urine Ketones   


 


Urine Blood   


 


Urine Nitrite   


 


Ur Leukocyte Esterase   


 


Urine WBC (Auto)   


 


Urine RBC (Auto)   


 


Blood Type   A POSITIVE 


 


Antibody Screen   NEGATIVE 








                                        











  01/07/21 01/07/21 01/07/21





  16:50 16:50 16:50


 


Creatine Kinase  116  


 


CK-MB (CK-2)   2.38 


 


Troponin I   < 0.012 


 


NT-Pro-B Natriuret Pep    1760 H











Impressions: 


                                        





Chest X-Ray  01/07/21 16:47


IMPRESSION:  Moderate cardiomegaly with mild pulmonary edema.  No focal 

consolidation.


 














Assessment & Plan





- Time


Anticipated Discharge Disposition: Home, Self Care


Anticipated Discharge Timeframe: Unknown





- Plan Summary


Plan Summary: 








Impressions possible GI bleed with anemia.  History of cirrhosis.





Last colonoscopy was approximately 6 years ago according to the patient who 

states that it was normal.





Surgery was consulted for possible upper and lower endoscopy secondary to GI 

bleed and anemia.





However today patient still does not have a platelet count and that is pending.





Patient has received at least 1 if possibly 2 units of blood since his 

admission.





I have ordered a bowel prep today for possible colonoscopy tomorrow.

## 2021-01-08 NOTE — PDOC PROGRESS REPORT
Subjective


Date:: 01/08/21


Subjective:: 





"SULY VILLALPANDO is a 60 year old male with past medical history significant for 

cirrhosis, chronic diastolic CHF, CKD 3B, chronic bilateral lower extremity 

edema, chronic blood loss anemia, hemorrhoids, chronic vision loss due to 

childhood trauma who presents to the ED with a 1 week history of progressive 

shortness of breath and generalized weakness.  Patient states he has been seeing

bright red blood on the toilet paper when he wipes after bowel movements 

although he admits this has been going on for years and seems to be worse 

lately.  He states he followed with GI outpatient in the past but this was 

before he moved here from [place] approximately 6 months ago.  Patient had a 

recent admission on 9/2020 for hyponatremia and he was taken off spironolactone.

 Patient states he has still been taking this medication along with Lasix 

however.  Sodium is lower than previous value at discharge, currently at 126.5. 

He also has ISSAC with creatinine up to 2.41 and he states he has been gaining 

weight and taking on fluid in his abdomen and legs which is worse than usual.  H

e previously have a history of alcohol abuse but states he has not drank in 

quite some time now.  General surgery consulted on admission and planning upper 

and lower endoscopies after patient is transfused to hemoglobin greater than 7. 

Transfusing 1 unit PRBC on admission followed by IV Lasix.  We also have him on 

sodium chloride tablets.  He is highly likely to continue having chronic 

hyponatremia due to cirrhosis and chronic volume overload and this will be 

challenging to control with diuretics and other medications."





D1 hospital stay.Care assumed today. Patient was seen and examined at bedside. 

He states that he is very nauseous, but otherwise he feels that his leg swelling

has gone down. Denies any chest pain, SOB, abdominal pain. Per surgery recs, 

plan is for colonoscopy tomorrow. S/p 1 unit PRBC with a repeat hgb of 7, 

additional unit ordered. 


Reason For Visit: 


ACUTE HYPONATREMIA,CHRONIC LOWER GI BLEEDING,








Physical Exam


Vital Signs: 


                                        











Temp Pulse Resp BP Pulse Ox


 


 98.0 F   55 L  19   102/40 L  97 


 


 01/08/21 16:07  01/08/21 16:07  01/08/21 16:07  01/08/21 16:07  01/08/21 16:07








                                 Intake & Output











 01/07/21 01/08/21 01/09/21





 06:59 06:59 06:59


 


Intake Total  400 380


 


Output Total  0 150


 


Balance  400 230


 


Weight  143.244 kg 143.244 kg











General appearance: PRESENT: no acute distress, cooperative, morbidly obese


Head exam: PRESENT: atraumatic, normocephalic


Eye exam: PRESENT: EOMI, PERRLA


Mouth exam: PRESENT: moist


Neck exam: PRESENT: full ROM


Respiratory exam: PRESENT: clear to auscultation karma, symmetrical, unlabored


Cardiovascular exam: PRESENT: RRR, +S1, +S2


Pulses: PRESENT: +2 pedal pulses bilateral


GI/Abdominal exam: PRESENT: ascites, normal bowel sounds, soft.  ABSENT: 

rebound, tenderness


Extremities exam: PRESENT: full ROM, +2 edema


Musculoskeletal exam: PRESENT: full ROM


Neurological exam: PRESENT: alert, awake, oriented to person, oriented to place,

oriented to time, oriented to situation


Psychiatric exam: PRESENT: unusual affect


Skin exam: PRESENT: normal color





Results


Laboratory Results: 


                                        





                                 01/08/21 10:05 





                                 01/08/21 08:29 





                                        











  01/07/21 01/07/21 01/07/21





  18:21 18:30 18:30


 


WBC   7.5 


 


RBC   2.02 L 


 


Hgb   6.8 L 


 


Hct   20.0 L 


 


MCV   99 H 


 


MCH   33.8 H 


 


MCHC   34.1 


 


RDW   17.3 H 


 


Plt Count    


 


Seg Neutrophils %   71.1 


 


Retic Count (auto)   


 


Absolute Retic   


 


Sodium   


 


Potassium   


 


Chloride   


 


Carbon Dioxide   


 


Anion Gap   


 


BUN   


 


Creatinine   


 


Est GFR (African Amer)   


 


Glucose   


 


Calcium   


 


Phosphorus   


 


Magnesium   


 


Iron   


 


TIBC   


 


% Saturation   


 


Ferritin   


 


Vitamin B12   


 


Folate   


 


Urine Color  YELLOW  


 


Urine Appearance  CLEAR  


 


Urine pH  6.0  


 


Ur Specific White  1.011  


 


Urine Protein  NEGATIVE  


 


Urine Glucose (UA)  NEGATIVE  


 


Urine Ketones  NEGATIVE  


 


Urine Blood  NEGATIVE  


 


Urine Nitrite  NEGATIVE  


 


Ur Leukocyte Esterase  NEGATIVE  


 


Urine WBC (Auto)  2  


 


Urine RBC (Auto)  0  


 


Blood Type    A POSITIVE


 


Antibody Screen    NEGATIVE














  01/08/21 01/08/21 01/08/21





  07:36 08:29 10:05


 


WBC  Cancelled   6.6


 


RBC  Cancelled   2.15 L


 


Hgb  Cancelled   7.0 L


 


Hct  Cancelled   20.8 L


 


MCV  Cancelled   97


 


MCH  Cancelled   32.4


 


MCHC  Cancelled   33.5


 


RDW  Cancelled   18.6 H


 


Plt Count  Cancelled   76 L


 


Seg Neutrophils %  Cancelled   67.3


 


Retic Count (auto)  Cancelled  


 


Absolute Retic  Cancelled  


 


Sodium   127.9 L 


 


Potassium   5.7 H 


 


Chloride   98 


 


Carbon Dioxide   23 


 


Anion Gap   7 


 


BUN   63 H 


 


Creatinine   2.36 H 


 


Est GFR ( Amer)   34 L 


 


Glucose   113 H 


 


Calcium   8.9 


 


Phosphorus   4.5 


 


Magnesium   2.4 H 


 


Iron   88.5 


 


TIBC   407 


 


% Saturation   22 


 


Ferritin   29.60 


 


Vitamin B12   > 1000.0 H 


 


Folate   19.60 


 


Urine Color   


 


Urine Appearance   


 


Urine pH   


 


Ur Specific Gravity   


 


Urine Protein   


 


Urine Glucose (UA)   


 


Urine Ketones   


 


Urine Blood   


 


Urine Nitrite   


 


Ur Leukocyte Esterase   


 


Urine WBC (Auto)   


 


Urine RBC (Auto)   


 


Blood Type   


 


Antibody Screen   








                                        











  01/07/21 01/07/21 01/07/21





  16:50 16:50 16:50


 


Creatine Kinase  116  


 


CK-MB (CK-2)   2.38 


 


Troponin I   < 0.012 


 


NT-Pro-B Natriuret Pep    1760 H











Impressions: 


                                        





Chest X-Ray  01/07/21 16:47


IMPRESSION:  Moderate cardiomegaly with mild pulmonary edema.  No focal 

consolidation.


 














Assessment and Plan





- Diagnosis


(1) Acute GI bleeding


Is this a current diagnosis for this admission?: Yes   


Plan: 





Likely acute on chronic lower GI bleeding but patient also at risk for variceal

bleeds due to cirrhosis


Hemoglobin down to 6.8 >7.0 s/p 1 unit PRBC, baseline 8


Trend CBC


General surgery consulted: Planning colonoscopy and upper endoscopy


IV PPI


N.p.o.


Anusol


Needs follow-up with local GI discharge








(2) Acute kidney injury superimposed on CKD


Is this a current diagnosis for this admission?: Yes   


Plan: 


- Crea 2.3 baseline about 1.2


Likely prerenal due to worsening anemia


Possible also has volume overload due to cirrhosis


 Pre renal or hepatorenal


- will CTM


- continue lasix for now due to hypervolemic hyponatremia








(3) Acute on chronic blood loss anemia


Is this a current diagnosis for this admission?: Yes   


Plan: 





Suspect due to chronic hemorrhoidal bleed but also at risk for upper GI bleed 

due to cirrhosis


s/p 1 unit pRBC  hgb incerased to 7. 1 more unit ordered


- will CTM








(4) Hemorrhoids


Qualifiers: 


   Hemorrhoid type: unspecified   Qualified Code(s): K64.9 - Unspecified 

hemorrhoids   


Is this a current diagnosis for this admission?: Yes   


Plan: 





May benefit from hemorrhoidal banding outpatient








(5) Alcoholic cirrhosis of liver


Qualifiers: 


   Ascites presence: unspecified   Qualified Code(s): K70.30 - Alcoholic 

cirrhosis of liver without ascites   


Is this a current diagnosis for this admission?: Yes   


Plan: 





Chronic cirrhosis


Taken off Aldactone during last admission 9/2020 due to severe hyponatremia 

however patient states he continues to take this medication outpatient


-  LU ordered


IV Lasix, transition to p.o. discharge


Continue rifaximin and lactulose


GI follow-up outpatient








(6) Bilateral lower extremity edema


Is this a current diagnosis for this admission?: Yes   


Plan: 


- 2/2 to cirrhosis


- continue lasix








(7) Hyperkalemia


Is this a current diagnosis for this admission?: Yes   





(8) Hyponatremia


Is this a current diagnosis for this admission?: Yes   


Plan: 





Likely due to volume overload in setting of chronic cirrhosis


Salt tablets, IV Lasix








(9) Thrombocytopenia


Is this a current diagnosis for this admission?: Yes   


Plan: 





Chronic








- Time


Time Spent with patient: 25-34 minutes


Medications reviewed and adjusted accordingly: Yes


Anticipated Discharge Disposition: Home with Home Health


Anticipated Discharge Timeframe: tbd

## 2021-01-08 NOTE — PDOC CONSULTATION
Consultation


Consult Date: 01/08/21


Provider Consulted: ARABELLA BEARD


Consult reason:: Anemia unknown cause, heme positive stools





History of Present Illness


Admission Date/PCP: 


  





  LEIF TELLEZ MD





History of Present Illness: 


SULY VILLALPANDO is a 60 year old male, with history of end-stage liver disease 

secondary to alcohol abuse, admitted for shortness of breath.  During the 

admission evaluation, blood work was done and this revealed an H&H of 6.8 and 

20, respectively which have dropped from the previous 8.5 and 24.2, respectively

on September 5, 2020.  The patient reports daily blood per rectum during 

defecation.  In addition, the patient suffers from thrombocytopenia with a 

platelet count ranging between 60,000-90,000.  Today, his platelet count cannot 

be measured because of clumping.








Past Medical History


Cardiac Medical History: Reports: Atrial Fibrillation


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


Musculoskeltal Medical History: Reports: Arthritis, Gout


Psychiatric Medical History: 


   Denies: Depression





Social History


Smoking Status: Never Smoker


Electronic Cigarette use?: No


Frequency of Alcohol Use: None


Hx Recreational Drug Use: No


Drugs: None


Hx Prescription Drug Abuse: No





Family History


Family History: Reviewed & Not Pertinent


Parental Family History Reviewed: No


Children Family History Reviewed: No


Sibling(s) Family History Reviewed.: No





Medication/Allergy


Home Medications: 








Carvedilol [Coreg 12.5 mg Tablet] 12.5 mg PO Q12 08/30/20 


Gabapentin [Neurontin] 800 mg PO TID 08/30/20 


Multivitamin with Folic Acid [Tab-A-Obed Tablet] 1 each PO DAILY 08/30/20 


Omeprazole 40 mg PO DAILY 08/30/20 


Ondansetron [Zofran Odt 4 mg Tablet] 4 mg PO BIDP PRN 08/30/20 


Rifaximin [Xifaxan 550 mg Tablet] 550 mg PO QHS 08/30/20 


Benzonatate [Tessalon Perles 100 mg Capsule] 100 mg PO Q8HP PRN  capsule 

09/11/20 


Colchicine [Colcrys 0.6 mg Tablet] 1 tab PO DAILY #0 09/11/20 


Furosemide [Lasix 20 mg Tablet] 40 mg PO DAILY #0 09/11/20 


Lactulose [Cephulac Syrup 20 gm/30 ml Udcup] 20 gm PO DAILY  udc 09/11/20 


Lidocaine [Lidoderm 5% (700 mg) Transdermal Patch] 1 patch TP DAILY  adh..patch 

09/11/20 








Allergies/Adverse Reactions: 


                                        





No Known Allergies Allergy (Unverified 08/30/20 01:24)


   











Physical Exam


Vital Signs: 


                                        











Temp Pulse Resp BP Pulse Ox


 


 98.8 F      14   116/57 L  98 


 


 01/07/21 18:01     01/07/21 23:01  01/07/21 23:01  01/07/21 23:01








                                 Intake & Output











 01/06/21 01/07/21 01/08/21





 06:59 06:59 06:59


 


Weight   157 kg











General appearance: PRESENT: no acute distress, obese, other - Patient is sleepy

but arousable, responds appropriately to questions


Head exam: PRESENT: atraumatic


Eye exam: PRESENT: EOMI - Normal on the left side, other - Right eye blindness


Mouth exam: PRESENT: dry mucosa, neck supple


Teeth exam: PRESENT: poor dentation


Neck exam: PRESENT: full ROM


Respiratory exam: PRESENT: clear to auscultation karma


Cardiovascular exam: PRESENT: RRR


GI/Abdominal exam: PRESENT: distended, organolmegaly - Enlarged liver, soft, 

other - Not distended, not tender, no surgical scars identified


Rectal exam: PRESENT: deferred, other - Heme positive stools


Extremities exam: PRESENT: clubbing


Musculoskeletal exam: PRESENT: full ROM


Neurological exam: PRESENT: alert - Sleepy but arousable, CN II-XII grossly 

intact


Skin exam: PRESENT: warm





Results


Laboratory Results: 


                                        





                                 01/07/21 18:30 





                                 01/07/21 16:50 





                                        











  01/07/21 01/07/21 01/07/21





  16:50 16:50 18:21


 


WBC  Cancelled  


 


RBC  Cancelled  


 


Hgb  Cancelled  


 


Hct  Cancelled  


 


MCV  Cancelled  


 


MCH  Cancelled  


 


MCHC  Cancelled  


 


RDW  Cancelled  


 


Plt Count  Cancelled  


 


Seg Neutrophils %  Cancelled  


 


Sodium   126.5 L 


 


Potassium   5.7 H 


 


Chloride   97 L 


 


Carbon Dioxide   24 


 


Anion Gap   6 


 


BUN   61 H 


 


Creatinine   2.41 H 


 


Est GFR ( Amer)   33 L 


 


Glucose   136 H 


 


Calcium   8.9 


 


Total Bilirubin   1.4 H 


 


AST   54 


 


Alkaline Phosphatase   187 H 


 


Total Protein   6.5 


 


Albumin   3.1 L 


 


Urine Color    YELLOW


 


Urine Appearance    CLEAR


 


Urine pH    6.0


 


Ur Specific [place]    1.011


 


Urine Protein    NEGATIVE


 


Urine Glucose (UA)    NEGATIVE


 


Urine Ketones    NEGATIVE


 


Urine Blood    NEGATIVE


 


Urine Nitrite    NEGATIVE


 


Ur Leukocyte Esterase    NEGATIVE


 


Urine WBC (Auto)    2


 


Urine RBC (Auto)    0


 


Blood Type   


 


Antibody Screen   














  01/07/21 01/07/21





  18:30 18:30


 


WBC  7.5 


 


RBC  2.02 L 


 


Hgb  6.8 L 


 


Hct  20.0 L 


 


MCV  99 H 


 


MCH  33.8 H 


 


MCHC  34.1 


 


RDW  17.3 H 


 


Plt Count   


 


Seg Neutrophils %  71.1 


 


Sodium  


 


Potassium  


 


Chloride  


 


Carbon Dioxide  


 


Anion Gap  


 


BUN  


 


Creatinine  


 


Est GFR (African Amer)  


 


Glucose  


 


Calcium  


 


Total Bilirubin  


 


AST  


 


Alkaline Phosphatase  


 


Total Protein  


 


Albumin  


 


Urine Color  


 


Urine Appearance  


 


Urine pH  


 


Ur Specific Gravity  


 


Urine Protein  


 


Urine Glucose (UA)  


 


Urine Ketones  


 


Urine Blood  


 


Urine Nitrite  


 


Ur Leukocyte Esterase  


 


Urine WBC (Auto)  


 


Urine RBC (Auto)  


 


Blood Type   A POSITIVE


 


Antibody Screen   NEGATIVE








                                        











  01/07/21 01/07/21 01/07/21





  16:50 16:50 16:50


 


Creatine Kinase  116  


 


CK-MB (CK-2)   2.38 


 


Troponin I   < 0.012 


 


NT-Pro-B Natriuret Pep    1760 H











Impressions: 


                                        





Chest X-Ray  01/07/21 16:47


IMPRESSION:  Moderate cardiomegaly with mild pulmonary edema.  No focal 

consolidation.


 














Assessment & Plan





- Diagnosis


(1) Chronic liver disease and cirrhosis


Is this a current diagnosis for this admission?: Yes   





(2) Thrombocytopenia


Is this a current diagnosis for this admission?: Yes   





(3) Anemia


Qualifiers: 


   Anemia type: iron deficiency   Iron deficiency anemia type: unspecified iron 

deficiency   Qualified Code(s): D50.9 - Iron deficiency anemia, unspecified   


Is this a current diagnosis for this admission?: Yes   





(4) Acute kidney injury


Is this a current diagnosis for this admission?: Yes   





- Plan Summary


Plan Summary: 





Assessment:





Shortness of breath with possible CHF as per chest x-ray


Alcoholic cirrhosis, end-stage liver disease


Patient reports blood per rectum daily during defecation


Hyponatremia with hyperkalemia, elevated BUN/creatinine 61 2.4 as per prerenal 

kidney failure


Anemia unknown cause (H&H 6.8 and 20) decreased from the 8.5 24.2, respectively 

in September 2020


History of thrombocytopenia ranging 60,000-90,000; however, today's platelet 

count cannot be assessed due to platelets' clumping


Elevated bilirubin 1.4 with normal AST ALT





Plan:





Patient will need EGD colonoscopy


When medically stable we will proceed with bowel prep.


I wiould keep the patient n.p.o. until his clinical condition has improved

## 2021-01-08 NOTE — ER DOCUMENT REPORT
ED General





- General


Chief Complaint: Shortness Of Breath


Stated Complaint: SHORTNESS OF BREATH


Time Seen by Provider: 01/07/21 21:27


Mode of Arrival: Medic


Information source: Patient, Emergency Med Personnel





- [facility]


Notes: 





Progressively worsening dyspnea on exertion over the last several days.  Patient

has been feeling somewhat weak.  He denies any abdominal pain.  He does not know

whether his stools have been bloody or black.  He denies any hematemesis or 

coffee-ground emesis.  He has had a colonoscopy and an upper endoscopy in the 

remote past but has had no studies recently.  His primary care doctor referred 

him to a hematologist for evaluation of his anemia and the patient says that he 

saw the hematologist yesterday.  He cannot give me any more information than 

that right now.  None of the notes from his primary care doctor or from the 

hematologist are available for review.  The patient is otherwise in his usual 

state of somewhat poor health.





- Related Data


Allergies/Adverse Reactions: 


                                        





No Known Allergies Allergy (Unverified 08/30/20 01:24)


   











Past Medical History





- General


Information source: Patient, [facility] Records





- Social History


Smoking Status: Never Smoker


Chew tobacco use (# tins/day): No


Frequency of alcohol use: hx of alcohol use


Drug Abuse: None


Lives with: Alone


Family History: Reviewed & Not Pertinent





- Medical History


Medical History: Other


Notes: 





Past medical history as documented in the electronic health record is reviewed.





- Past Medical History


Cardiac Medical History: Reports: Hx Atrial Fibrillation


GI Medical History: Reports: Hx Cirrhosis - Secondary to alcohol, Hx Liver 

Failure, Other - Hemorrhoids


Musculoskeletal Medical History: Reports Hx Arthritis, Reports Hx Gout


Psychiatric Medical History: 


   Denies: Hx Depression


Past Surgical History: Reports: Other - Eye surgery





Review of Systems





- Review of Systems


Notes: 





All other systems were reviewed and are negative or noncontributory sepsis noted

the present illness.





Physical Exam





- Vital signs


Vitals: 





                                        











Resp Pulse Ox


 


 15   97 


 


 01/07/21 16:35  01/07/21 16:35














- Notes


Notes: 





This patient is a morbidly obese chronically ill-appearing male in no acute 

distress.  Vital signs and nursing chief complaint are reviewed.


HEENT: Patient wears a patch over his right eye which was traumatically injured 

as a child.  ENT exam is otherwise grossly normal.


Neck: Supple no adenopathy.


Chest: Normal configuration lungs clear to auscultation.


Heart: Distant tones regular rate and rhythm no murmur.


Abdomen: Morbidly obese soft mild epigastric direct tenderness.  No masses 

organomegaly.


Rectal: Dark brownish-black stool was recovered which was Hemoccult positive.


Extremities: 3+ woody nonpitting edema to the knees bilaterally.


Skin: Warm and dry and relatively intact.


Neuro: No focal neuro deficits noted.





Course





- Re-evaluation


Re-evalutation: 





01/08/21 02:36


Patient remained hemodynamically stable.  Think his dyspnea is likely due to his

anemia in combination with his congestive heart failure.  I discussed the case 

with the hospitalist on duty who accepted the patient for admission and asked me

to notify the surgeon on-call as a courtesy.  I spoke with the surgeon on call 

as well so he was aware of the patient.





- Vital Signs


Vital signs: 





                                        











Temp Pulse Resp BP Pulse Ox


 


 98.1 F   53 L  16   117/52 L  96 


 


 01/08/21 02:18  01/08/21 02:18  01/08/21 02:18  01/08/21 02:18  01/08/21 02:18














- Laboratory Results


Result Diagrams: 


                                 01/07/21 18:30





                                 01/07/21 16:50


Laboratory Results Interpreted: 





                                        











  01/07/21 01/07/21 01/07/21





  16:50 16:50 18:30


 


RBC    2.02 L


 


Hgb    6.8 L


 


Hct    20.0 L


 


MCV    99 H


 


MCH    33.8 H


 


RDW    17.3 H


 


Lymph % (Auto)    11.6 L


 


Mono % (Auto)    13.9 H


 


Sodium  126.5 L  


 


Potassium  5.7 H  


 


Chloride  97 L  


 


BUN  61 H  


 


Creatinine  2.41 H  


 


Est GFR ( Amer)  33 L  


 


Est GFR (MDRD) Non-Af  28 L  


 


Glucose  136 H  


 


Total Bilirubin  1.4 H  


 


Direct Bilirubin  0.7 H  


 


Alkaline Phosphatase  187 H  


 


NT-Pro-B Natriuret Pep   1760 H 


 


Albumin  3.1 L  


 


Crossmatch   














  01/07/21





  18:30


 


RBC 


 


Hgb 


 


Hct 


 


MCV 


 


MCH 


 


RDW 


 


Lymph % (Auto) 


 


Mono % (Auto) 


 


Sodium 


 


Potassium 


 


Chloride 


 


BUN 


 


Creatinine 


 


Est GFR ( Amer) 


 


Est GFR (MDRD) Non-Af 


 


Glucose 


 


Total Bilirubin 


 


Direct Bilirubin 


 


Alkaline Phosphatase 


 


NT-Pro-B Natriuret Pep 


 


Albumin 


 


Crossmatch  See Detail











Critical Laboratory Results Reviewed: Yes


Attending or Supervising Physician who Reviewed Labs: WASKIN,FARRELL B





- Radiology Results


Critical Radiology Results Reviewed: No Critical Results





Discharge





- Discharge


Clinical Impression: 


 Lower GI bleeding, Acute kidney injury





Condition: Serious


Disposition: ADMITTED AS INPATIENT


Admitting Provider: Audrey (Hospitalist)


Unit Admitted: Medical Floor

## 2021-01-08 NOTE — PDOC H&P
History of Present Illness


Admission Date/PCP: 


  





  LEIF TELLEZ MD





History of Present Illness: 


SULY VILLALPANDO is a 60 year old male with past medical history significant for 

cirrhosis, chronic diastolic CHF, CKD 3B, chronic bilateral lower extremity 

edema, chronic blood loss anemia, hemorrhoids, chronic vision loss due to 

childhood trauma who presents to the ED with a 1 week history of progressive 

shortness of breath and generalized weakness.  Patient states he has been seeing

bright red blood on the toilet paper when he wipes after bowel movements 

although he admits this has been going on for years and seems to be worse 

lately.  He states he followed with GI outpatient in the past but this was 

before he moved here from [place] approximately 6 months ago.  Patient had a 

recent admission on 9/2020 for hyponatremia and he was taken off spironolactone.

 Patient states he has still been taking this medication along with Lasix 

however.  Sodium is lower than previous value at discharge, currently at 126.5. 

He also has ISSAC with creatinine up to 2.41 and he states he has been gaining 

weight and taking on fluid in his abdomen and legs which is worse than usual.  

He previously have a history of alcohol abuse but states he has not drank in 

quite some time now.  General surgery consulted on admission and planning upper 

and lower endoscopies after patient is transfused to hemoglobin greater than 7. 

Transfusing 1 unit PRBC on admission followed by IV Lasix.  We also have him on 

sodium chloride tablets.  He is highly likely to continue having chronic 

hyponatremia due to cirrhosis and chronic volume overload and this will be 

challenging to control with diuretics and other medications.








Past Medical History


Cardiac Medical History: Reports: Atrial Fibrillation


Renal/ Medical History: Reports: Chronic Kidney Disease


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 

Hemorrhoids


Musculoskeltal Medical History: Reports: Arthritis, Gout


Psychiatric Medical History: 


   Denies: Depression





Past Surgical History


Past Surgical History: Reports: Other - Eye surgery





Social History


Information Source: Patient, Emergency Med Personnel


Lives with: Alone


Smoking Status: Never Smoker


Electronic Cigarette use?: No


Frequency of Alcohol Use: None


Hx Recreational Drug Use: No


Drugs: None


Hx Prescription Drug Abuse: No





- Advance Directive


Resuscitation Status: Do Not Resuscitate


Surrogate healthcare decision maker:: 





Admitting diagnosis: Acute on chronic lower GI bleed





All aspects of code status discussed with patient/POA including cardioversion, 

chest compressions, and intubation and the patient/POA indicated they wish to be

DNR/DNI





MPOA is designated as: SisterImani





Time spent: Greater than 16 minutes





Family History


Family History: Reviewed & Not Pertinent


Parental Family History Reviewed: Yes


Children Family History Reviewed: Yes


Sibling(s) Family History Reviewed.: Yes





Medication/Allergy


Home Medications: 








Carvedilol [Coreg 12.5 mg Tablet] 12.5 mg PO Q12 08/30/20 


Gabapentin [Neurontin] 800 mg PO TID 08/30/20 


Multivitamin with Folic Acid [Tab-A-Obed Tablet] 1 each PO DAILY 08/30/20 


Omeprazole 40 mg PO DAILY 08/30/20 


Ondansetron [Zofran Odt 4 mg Tablet] 4 mg PO BIDP PRN 08/30/20 


Rifaximin [Xifaxan 550 mg Tablet] 550 mg PO QHS 08/30/20 


Benzonatate [Tessalon Perles 100 mg Capsule] 100 mg PO Q8HP PRN  capsule 

09/11/20 


Colchicine [Colcrys 0.6 mg Tablet] 1 tab PO DAILY #0 09/11/20 


Furosemide [Lasix 20 mg Tablet] 40 mg PO DAILY #0 09/11/20 


Lactulose [Cephulac Syrup 20 gm/30 ml Udcup] 20 gm PO DAILY  udc 09/11/20 


Lidocaine [Lidoderm 5% (700 mg) Transdermal Patch] 1 patch TP DAILY  adh..patch 

09/11/20 








Allergies/Adverse Reactions: 


                                        





No Known Allergies Allergy (Unverified 08/30/20 01:24)


   











Review of Systems


All systems: reviewed and no additional remarkable complaints except as stated -

Per HPI otherwise negative





Physical Exam


Vital Signs: 


                                        











Temp Pulse Resp BP Pulse Ox


 


 98.8 F      14   116/57 L  98 


 


 01/07/21 18:01     01/07/21 23:01  01/07/21 23:01  01/07/21 23:01








                                 Intake & Output











 01/06/21 01/07/21 01/08/21





 06:59 06:59 06:59


 


Weight   157 kg











Exam: 





General appearance: PRESENT: no acute distress, well-developed, well-nourished, 

chronically ill-appearing white male


Head exam: PRESENT: atraumatic, normocephalic


Eye exam: PRESENT: conjunctiva pink, evidence of previous healed trauma of eyes.

 ABSENT: scleral icterus


Mouth exam: PRESENT: moist


Respiratory exam: PRESENT: clear to auscultation karma.  ABSENT: rales, rhonchi, 

wheezes


Cardiovascular exam: PRESENT: RRR, +2-3 chronic BLE edema.  ABSENT: diastolic 

murmur, rubs, systolic murmur


GI/Abdominal exam: PRESENT: normal bowel sounds, soft, largely distended 

abdomen.  ABSENT: guarding, mass, organolmegaly, rebound, tenderness


Neurological exam: PRESENT: alert, awake, oriented to person, oriented to place,

oriented to time, oriented to situation


Psychiatric exam: PRESENT: appropriate affect, normal mood


Skin exam: PRESENT: dry, intact, warm





Results


Laboratory Results: 


                                        





                                 01/07/21 18:30 





                                 01/07/21 16:50 





                                        











  01/07/21 01/07/21 01/07/21





  16:50 16:50 18:21


 


WBC  Cancelled  


 


RBC  Cancelled  


 


Hgb  Cancelled  


 


Hct  Cancelled  


 


MCV  Cancelled  


 


MCH  Cancelled  


 


MCHC  Cancelled  


 


RDW  Cancelled  


 


Plt Count  Cancelled  


 


Seg Neutrophils %  Cancelled  


 


Sodium   126.5 L 


 


Potassium   5.7 H 


 


Chloride   97 L 


 


Carbon Dioxide   24 


 


Anion Gap   6 


 


BUN   61 H 


 


Creatinine   2.41 H 


 


Est GFR ( Amer)   33 L 


 


Glucose   136 H 


 


Calcium   8.9 


 


Total Bilirubin   1.4 H 


 


AST   54 


 


Alkaline Phosphatase   187 H 


 


Total Protein   6.5 


 


Albumin   3.1 L 


 


Urine Color    YELLOW


 


Urine Appearance    CLEAR


 


Urine pH    6.0


 


Ur Specific [place]    1.011


 


Urine Protein    NEGATIVE


 


Urine Glucose (UA)    NEGATIVE


 


Urine Ketones    NEGATIVE


 


Urine Blood    NEGATIVE


 


Urine Nitrite    NEGATIVE


 


Ur Leukocyte Esterase    NEGATIVE


 


Urine WBC (Auto)    2


 


Urine RBC (Auto)    0


 


Blood Type   


 


Antibody Screen   














  01/07/21 01/07/21





  18:30 18:30


 


WBC  7.5 


 


RBC  2.02 L 


 


Hgb  6.8 L 


 


Hct  20.0 L 


 


MCV  99 H 


 


MCH  33.8 H 


 


MCHC  34.1 


 


RDW  17.3 H 


 


Plt Count   


 


Seg Neutrophils %  71.1 


 


Sodium  


 


Potassium  


 


Chloride  


 


Carbon Dioxide  


 


Anion Gap  


 


BUN  


 


Creatinine  


 


Est GFR (African Amer)  


 


Glucose  


 


Calcium  


 


Total Bilirubin  


 


AST  


 


Alkaline Phosphatase  


 


Total Protein  


 


Albumin  


 


Urine Color  


 


Urine Appearance  


 


Urine pH  


 


Ur Specific Gravity  


 


Urine Protein  


 


Urine Glucose (UA)  


 


Urine Ketones  


 


Urine Blood  


 


Urine Nitrite  


 


Ur Leukocyte Esterase  


 


Urine WBC (Auto)  


 


Urine RBC (Auto)  


 


Blood Type   A POSITIVE


 


Antibody Screen   NEGATIVE








                                        











  01/07/21 01/07/21 01/07/21





  16:50 16:50 16:50


 


Creatine Kinase  116  


 


CK-MB (CK-2)   2.38 


 


Troponin I   < 0.012 


 


NT-Pro-B Natriuret Pep    1760 H











Impressions: 


                                        





Chest X-Ray  01/07/21 16:47


IMPRESSION:  Moderate cardiomegaly with mild pulmonary edema.  No focal 

consolidation.


 














Assessment and Plan





- Diagnosis


(1) Acute GI bleeding


Is this a current diagnosis for this admission?: Yes   


Plan: 





Likely acute on chronic lower GI bleeding but patient also at risk for variceal

bleeds due to cirrhosis


Hemoglobin down to 6.8 on admission, previous values typically in the mid e

ights at baseline


Transfuse 1 unit PRBC on admission


Trend CBC


General surgery consulted: Planning colonoscopy and upper endoscopy


IV PPI


N.p.o.


Anusol


Needs follow-up with local GI discharge








(2) Acute on chronic blood loss anemia


Is this a current diagnosis for this admission?: Yes   


Plan: 





Suspect due to chronic hemorrhoidal bleed but also at risk for upper GI bleed 

due to cirrhosis


Trend CBC


Transfuse for hemoglobin less than 7


Symptomatic with shortness of breath and generalized weakness on admission








(3) Acute kidney injury superimposed on CKD


Is this a current diagnosis for this admission?: Yes   


Plan: 





Likely prerenal due to worsening anemia


Possible also has volume overload due to cirrhosis


Transfuse blood as above followed by Lasix to manage volume








(4) Hemorrhoids


Qualifiers: 


   Hemorrhoid type: unspecified   Qualified Code(s): K64.9 - Unspecified 

hemorrhoids   


Is this a current diagnosis for this admission?: Yes   


Plan: 





May benefit from hemorrhoidal banding outpatient








(5) Alcoholic cirrhosis of liver


Qualifiers: 


   Ascites presence: unspecified   Qualified Code(s): K70.30 - Alcoholic 

cirrhosis of liver without ascites   


Is this a current diagnosis for this admission?: Yes   


Plan: 





Chronic cirrhosis


Taken off Aldactone during last admission 9/2020 due to severe hyponatremia 

however patient states he continues to take this medication outpatient


IV Lasix, transition to p.o. discharge


Continue rifaximin and lactulose


GI follow-up outpatient








(6) Bilateral lower extremity edema


Is this a current diagnosis for this admission?: Yes   





(7) Hyperkalemia


Is this a current diagnosis for this admission?: Yes   


Plan: 





Likely due to acute renal failure


IV Lasix








(8) Hyponatremia


Is this a current diagnosis for this admission?: Yes   


Plan: 





Likely due to volume overload in setting of chronic cirrhosis


Salt tablets, IV Lasix








(9) Thrombocytopenia


Is this a current diagnosis for this admission?: Yes   


Plan: 





Chronic








- Time


Time Spent with patient: 35 or more minutes


Medications reviewed and adjusted accordingly: Yes


Anticipated Discharge Disposition: Home, Self Care


Anticipated Discharge Timeframe: within 72 hours

## 2021-01-09 LAB
ADD MANUAL DIFF: NO
ANION GAP SERPL CALC-SCNC: 6 MMOL/L (ref 5–19)
BASOPHILS # BLD AUTO: 0 10^3/UL (ref 0–0.2)
BASOPHILS NFR BLD AUTO: 0.3 % (ref 0–2)
BUN SERPL-MCNC: 62 MG/DL (ref 7–20)
CALCIUM: 9 MG/DL (ref 8.4–10.2)
CHLORIDE SERPL-SCNC: 99 MMOL/L (ref 98–107)
CO2 SERPL-SCNC: 24 MMOL/L (ref 22–30)
EOSINOPHIL # BLD AUTO: 0.2 10^3/UL (ref 0–0.6)
EOSINOPHIL NFR BLD AUTO: 3.5 % (ref 0–6)
ERYTHROCYTE [DISTWIDTH] IN BLOOD BY AUTOMATED COUNT: 20.7 % (ref 11.5–14)
GLUCOSE SERPL-MCNC: 117 MG/DL (ref 75–110)
HCT VFR BLD CALC: 22.4 % (ref 37.9–51)
HGB BLD-MCNC: 7.6 G/DL (ref 13.5–17)
LYMPHOCYTES # BLD AUTO: 1.2 10^3/UL (ref 0.5–4.7)
LYMPHOCYTES NFR BLD AUTO: 18.4 % (ref 13–45)
MCH RBC QN AUTO: 32 PG (ref 27–33.4)
MCHC RBC AUTO-ENTMCNC: 34 G/DL (ref 32–36)
MCV RBC AUTO: 94 FL (ref 80–97)
MONOCYTES # BLD AUTO: 1 10^3/UL (ref 0.1–1.4)
MONOCYTES NFR BLD AUTO: 14.2 % (ref 3–13)
NEUTROPHILS # BLD AUTO: 4.3 10^3/UL (ref 1.7–8.2)
NEUTS SEG NFR BLD AUTO: 63.6 % (ref 42–78)
PHOSPHATE SERPL-MCNC: 4.3 MG/DL (ref 2.5–4.5)
PLATELET # BLD: 69 10^3/UL (ref 150–450)
POTASSIUM SERPL-SCNC: 5.6 MMOL/L (ref 3.6–5)
RBC # BLD AUTO: 2.38 10^6/UL (ref 4.35–5.55)
TOTAL CELLS COUNTED % (AUTO): 100 %
WBC # BLD AUTO: 6.8 10^3/UL (ref 4–10.5)

## 2021-01-09 PROCEDURE — 0DJD8ZZ INSPECTION OF LOWER INTESTINAL TRACT, VIA NATURAL OR ARTIFICIAL OPENING ENDOSCOPIC: ICD-10-PCS | Performed by: SURGERY

## 2021-01-09 PROCEDURE — 0DJ08ZZ INSPECTION OF UPPER INTESTINAL TRACT, VIA NATURAL OR ARTIFICIAL OPENING ENDOSCOPIC: ICD-10-PCS | Performed by: SURGERY

## 2021-01-09 PROCEDURE — 3E0H8GC INTRODUCTION OF OTHER THERAPEUTIC SUBSTANCE INTO LOWER GI, VIA NATURAL OR ARTIFICIAL OPENING ENDOSCOPIC: ICD-10-PCS | Performed by: SURGERY

## 2021-01-09 RX ADMIN — PANTOPRAZOLE SODIUM SCH MG: 40 INJECTION, POWDER, FOR SOLUTION INTRAVENOUS at 21:56

## 2021-01-09 RX ADMIN — LIDOCAINE SCH: 50 PATCH CUTANEOUS at 10:39

## 2021-01-09 RX ADMIN — SODIUM CHLORIDE TAB 1 GM SCH GM: 1 TAB at 06:03

## 2021-01-09 RX ADMIN — LACTULOSE SCH: 20 SOLUTION ORAL at 10:39

## 2021-01-09 RX ADMIN — COLCHICINE SCH: 0.6 TABLET, FILM COATED ORAL at 10:39

## 2021-01-09 RX ADMIN — FUROSEMIDE SCH: 10 INJECTION, SOLUTION INTRAMUSCULAR; INTRAVENOUS at 10:39

## 2021-01-09 RX ADMIN — HYDROCORTISONE ACETATE SCH: 25 SUPPOSITORY RECTAL at 10:39

## 2021-01-09 RX ADMIN — CARVEDILOL SCH MG: 12.5 TABLET, FILM COATED ORAL at 21:56

## 2021-01-09 RX ADMIN — Medication SCH: at 10:40

## 2021-01-09 RX ADMIN — GABAPENTIN SCH: 300 CAPSULE ORAL at 13:19

## 2021-01-09 RX ADMIN — PANTOPRAZOLE SODIUM SCH: 40 INJECTION, POWDER, FOR SOLUTION INTRAVENOUS at 10:40

## 2021-01-09 RX ADMIN — HYDROCORTISONE ACETATE SCH: 25 SUPPOSITORY RECTAL at 17:25

## 2021-01-09 RX ADMIN — MULTIVITAMIN TABLET SCH: TABLET at 10:40

## 2021-01-09 RX ADMIN — GABAPENTIN SCH MG: 300 CAPSULE ORAL at 17:25

## 2021-01-09 RX ADMIN — GABAPENTIN SCH: 300 CAPSULE ORAL at 10:39

## 2021-01-09 RX ADMIN — SODIUM CHLORIDE TAB 1 GM SCH GM: 1 TAB at 17:25

## 2021-01-09 RX ADMIN — FUROSEMIDE SCH MG: 10 INJECTION, SOLUTION INTRAMUSCULAR; INTRAVENOUS at 17:25

## 2021-01-09 RX ADMIN — RIFAXIMIN SCH MG: 550 TABLET ORAL at 22:00

## 2021-01-09 RX ADMIN — ONDANSETRON PRN MG: 4 TABLET, ORALLY DISINTEGRATING ORAL at 07:48

## 2021-01-09 RX ADMIN — CARVEDILOL SCH: 12.5 TABLET, FILM COATED ORAL at 10:39

## 2021-01-09 NOTE — OPERATIVE REPORT
Operative Report


DATE OF SURGERY: 01/09/21


PREOPERATIVE DIAGNOSIS: Anemia unknown cause, heme positive stools, hematochezi

a, cirrhosis of the liver


POSTOPERATIVE DIAGNOSIS: Negative EGD;  heme positive stools, hematochezia, 

cirrhosis of the liver; bleeding colonic diverticulum at 65 cm from the anal 

verge, few scattered sigmoid colon diverticuli


OPERATION: EGD, colonoscopy to cecum, epinephrine injection 1.5 cm


TISSUE REMOVED OR ALTERED: None


COMPLICATIONS: 





None


ESTIMATED BLOOD LOSS: None


INTRAOPERATIVE FINDINGS: Negative EGD; bleeding colonic single diverticulum at 

65 cm from the anal verge, few scattered sigmoid colon diverticuli


PROCEDURE: 














The procedure was done in surgery, the patient was placed in a lateral decubitus

on the surgical bed; IV sedation was provided by the anesthesiologist, the 

gastroscope was inserted into the mouth, esophagus, stomach, and first, second, 

and third portion of the duodenum.  The preparation was good,  and no masses, 

polyps, strictures, mucosal changes, or ulcerations were noted.   The duodenum 

was [normal].  The scope  was then slowly withdrawn into the stomach, 

retroflexed:  the fundus and the GE junction appeared to be within the normal 

limits.  The scope was slowly withdrawn into the esophagus which appeared to be 

normal and removed from the patient mouth without difficulty.  The patient 

tolerated procedure well.





The colonoscopy was performed by placing the patient was placed in the lateral 

decubitus,     The scope was inserted into the rectum and the several segments 

of the colon up to the cecum.  The preparation was good: no masses, polyps, 

strictures, mucosal changes, were noted.  However, single bleeding colonic 

diverticulum at 65 cm from the anal verge was identified, the mucosa around it 

was injected with total 1.5 cm of epinephrine with resolution of the bleeding, 

the few scattered sigmoid colon diverticuli were also identified.  At the level 

of the rectum, the scope was retroflexed and no lesions were identified and no 

hemorrhoids seen.  The scope was then slowly withdrawn from the cecum to rectum 

for a duration of approximately 7 minutes and extracted from the rectum.  





The patient tolerated procedure well and transferred to the recovery room in 

satisfactory conditions.

## 2021-01-09 NOTE — PDOC PROGRESS REPORT
Subjective


Date:: 01/09/21


Subjective:: 





"SULY VILLALPANDO is a 60 year old male with past medical history significant for 

cirrhosis, chronic diastolic CHF, CKD 3B, chronic bilateral lower extremity 

edema, chronic blood loss anemia, hemorrhoids, chronic vision loss due to 

childhood trauma who presents to the ED with a 1 week history of progressive 

shortness of breath and generalized weakness.  Patient states he has been seeing

bright red blood on the toilet paper when he wipes after bowel movements 

although he admits this has been going on for years and seems to be worse 

lately.  He states he followed with GI outpatient in the past but this was 

before he moved here from [place] approximately 6 months ago.  Patient had a 

recent admission on 9/2020 for hyponatremia and he was taken off spironolactone.

 Patient states he has still been taking this medication along with Lasix 

however.  Sodium is lower than previous value at discharge, currently at 126.5. 

He also has ISSAC with creatinine up to 2.41 and he states he has been gaining 

weight and taking on fluid in his abdomen and legs which is worse than usual.  H

e previously have a history of alcohol abuse but states he has not drank in 

quite some time now.  General surgery consulted on admission and planning upper 

and lower endoscopies after patient is transfused to hemoglobin greater than 7. 

Transfusing 1 unit PRBC on admission followed by IV Lasix.  We also have him on 

sodium chloride tablets.  He is highly likely to continue having chronic 

hyponatremia due to cirrhosis and chronic volume overload and this will be 

challenging to control with diuretics and other medications."





D1 hospital stay.Care assumed today. Patient was seen and examined at bedside. 

He states that he is very nauseous, but otherwise he feels that his leg swelling

has gone down. Denies any chest pain, SOB, abdominal pain. Per surgery recs, 

plan is for colonoscopy tomorrow. S/p 1 unit PRBC with a repeat hgb of 7, 

additional unit ordered. 





D2 hospital stay. Patient was seen and examined at bedside. He is much more 

awake and alert. No new complains besides diarrhea due to the bowel prep. 

Ammonia level 91 however he is awake and alert so will just continue to monitor 

his mental status. Hgb stable at 7.6. He is awaiting EGD and colonoscopy today. 




Reason For Visit: 


ACUTE HYPONATREMIA,CHRONIC LOWER GI BLEEDING,








Physical Exam


Vital Signs: 


                                        











Temp Pulse Resp BP Pulse Ox


 


 97.5 F   55 L  18   116/47 L  99 


 


 01/09/21 11:34  01/09/21 11:34  01/09/21 11:34  01/09/21 11:34  01/09/21 11:34








                                 Intake & Output











 01/08/21 01/09/21 01/10/21





 06:59 06:59 06:59


 


Intake Total 400 1350 775


 


Output Total 0 550 


 


Balance 400 800 775


 


Weight 143.244 kg 141.022 kg 














Results


Laboratory Results: 


                                        





                                 01/09/21 05:53 





                                 01/09/21 08:03 





                                        











  01/07/21 01/08/21 01/08/21





  18:30 20:33 20:33


 


WBC    6.9


 


RBC    2.50 L


 


Hgb    7.9 L


 


Hct    23.4 L


 


MCV    94


 


MCH    31.7


 


MCHC    33.8


 


RDW    20.8 H


 


Plt Count    


 


Seg Neutrophils %    69.4


 


Sodium   


 


Potassium   


 


Chloride   


 


Carbon Dioxide   


 


Anion Gap   


 


BUN   


 


Creatinine   


 


Est GFR ( Amer)   


 


Est GFR (Non-Af Amer)   


 


Glucose   


 


Calcium   


 


Phosphorus   


 


Magnesium   


 


Ammonia   91.0 H 


 


Blood Type  A POSITIVE  


 


Antibody Screen  NEGATIVE  














  01/09/21 01/09/21 01/09/21





  05:47 05:53 08:03


 


WBC   6.8 


 


RBC   2.38 L 


 


Hgb   7.6 L 


 


Hct   22.4 L 


 


MCV   94 


 


MCH   32.0 


 


MCHC   34.0 


 


RDW   20.7 H 


 


Plt Count   69 L 


 


Seg Neutrophils %   63.6 


 


Sodium  Cancelled   129.0 L


 


Potassium  Cancelled   5.6 H


 


Chloride  Cancelled   99


 


Carbon Dioxide  Cancelled   24


 


Anion Gap  Cancelled   6


 


BUN  Cancelled   62 H


 


Creatinine  Cancelled   2.37 H


 


Est GFR ( Amer)  Cancelled   34 L


 


Est GFR (Non-Af Amer)  Cancelled  


 


Glucose  Cancelled   117 H


 


Calcium  Cancelled   9.0


 


Phosphorus  Cancelled   4.3


 


Magnesium  Cancelled   2.3


 


Ammonia   


 


Blood Type   


 


Antibody Screen   














  01/09/21





  12:31


 


WBC 


 


RBC 


 


Hgb 


 


Hct 


 


MCV 


 


MCH 


 


MCHC 


 


RDW 


 


Plt Count 


 


Seg Neutrophils % 


 


Sodium 


 


Potassium 


 


Chloride 


 


Carbon Dioxide 


 


Anion Gap 


 


BUN 


 


Creatinine 


 


Est GFR ( Amer) 


 


Est GFR (Non-Af Amer) 


 


Glucose 


 


Calcium 


 


Phosphorus 


 


Magnesium 


 


Ammonia  58.7 H


 


Blood Type 


 


Antibody Screen 








                                        











  01/07/21 01/07/21 01/07/21





  16:50 16:50 16:50


 


Creatine Kinase  116  


 


CK-MB (CK-2)   2.38 


 


Troponin I   < 0.012 


 


NT-Pro-B Natriuret Pep    1760 H











Impressions: 


                                        





Chest X-Ray  01/07/21 16:47


IMPRESSION:  Moderate cardiomegaly with mild pulmonary edema.  No focal 

consolidation.


 








Abdomen Ultrasound  01/08/21 00:00


IMPRESSION:  Fatty liver without other significant findings.


 














Assessment and Plan





- Diagnosis


(1) Acute GI bleeding


Is this a current diagnosis for this admission?: Yes   


Plan: 





Likely acute on chronic lower GI bleeding but patient also at risk for variceal

bleeds due to cirrhosis


Hemoglobin down to 6.8 >7.0>7.6 


- s/p 2 unit PRBC, baseline 8


General surgery consulted: Planning colonoscopy and upper endoscopy


IV PPI


N.p.o.


Anusol


Needs follow-up with local GI discharge








(2) Acute kidney injury superimposed on CKD


Is this a current diagnosis for this admission?: Yes   


Plan: 


- Crea 2.3>2.37 baseline about 1.2


Possible also has volume overload due to cirrhosis


 Pre renal or hepatorenal


- will CTM


- continue lasix for now due to hypervolemic hyponatremia








(3) Acute on chronic blood loss anemia


Is this a current diagnosis for this admission?: Yes   


Plan: 





Suspect due to chronic hemorrhoidal bleed but also at risk for upper GI bleed 

due to cirrhosis


s/p 2 unit pRBC hgb incerased to 7.6


- will CTM








(4) Hemorrhoids


Qualifiers: 


   Hemorrhoid type: unspecified   Qualified Code(s): K64.9 - Unspecified 

hemorrhoids   


Is this a current diagnosis for this admission?: Yes   


Plan: 





May benefit from hemorrhoidal banding outpatient








(5) Alcoholic cirrhosis of liver


Qualifiers: 


   Ascites presence: unspecified   Qualified Code(s): K70.30 - Alcoholic 

cirrhosis of liver without ascites   


Is this a current diagnosis for this admission?: Yes   


Plan: 





Chronic cirrhosis


Taken off Aldactone during last admission 9/2020 due to severe hyponatremia 

however patient states he continues to take this medication outpatient


- US RUQ showed fatty liver


IV Lasix, transition to p.o. discharge


Continue rifaximin and lactulose


GI follow-up outpatient








(6) Bilateral lower extremity edema


Is this a current diagnosis for this admission?: Yes   


Plan: 


- 2/2 to cirrhosis


- continue lasix








(7) Hyperkalemia


Is this a current diagnosis for this admission?: Yes   


Plan: 





Likely due to acute renal failure


- should improve with Lasix








(8) Hyponatremia


Is this a current diagnosis for this admission?: Yes   


Plan: 


- 127>129


Likely due to volume overload in setting of chronic cirrhosis


Salt tablets, IV Lasix








(9) Thrombocytopenia


Is this a current diagnosis for this admission?: Yes   


Plan: 





Chronic








- Time


Time Spent with patient: 15-24 minutes


Medications reviewed and adjusted accordingly: Yes


Anticipated Discharge Disposition: Home with Home Health


Anticipated Discharge Timeframe: within 48 hours

## 2021-01-09 NOTE — PROGRESS NOTE
Provider Note


Provider Note: 





EGD colonoscopy performed





Assessment:





EGD negative


Colonoscopy demonstrated a single bleeding diverticulum a 65 cm which was 

injected with 1.5 mL of epinephrine with resolution of the bleeding, the 

remaining portion of the colon was negative except for a few scattered 

diverticuli of the sigmoid colon





Plan:





No additional therapy necessary as bleeding from the diverticulum 65 cm and on 

the verge will start to spontaneously.


Recommended not to use any blood thinners i.e. Lovenox or heparin for the next 

48 hours





We will sign off.  Please call me with questions.

## 2021-01-09 NOTE — PDOC PROGRESS REPORT
Subjective


Date:: 01/09/21


Subjective:: 





No complaints


Reason For Visit: 


ACUTE HYPONATREMIA,CHRONIC LOWER GI BLEEDING,








Physical Exam


Vital Signs: 


                                        











Temp Pulse Resp BP Pulse Ox


 


 97.6 F   53 L  20   129/58 H  96 


 


 01/08/21 23:16  01/08/21 23:16  01/08/21 23:16  01/08/21 19:38  01/08/21 23:16








                                 Intake & Output











 01/08/21 01/09/21 01/10/21





 06:59 06:59 06:59


 


Intake Total 400 1350 


 


Output Total 0 550 


 


Balance 400 800 


 


Weight 143.244 kg 141.022 kg 











General appearance: PRESENT: no acute distress, obese


GI/Abdominal exam: PRESENT: soft





Results


Laboratory Results: 


                                        





                                 01/09/21 05:53 





                                 01/09/21 05:47 





                                        











  01/07/21 01/08/21 01/08/21





  18:30 07:36 08:29


 


WBC   Cancelled 


 


RBC   Cancelled 


 


Hgb   Cancelled 


 


Hct   Cancelled 


 


MCV   Cancelled 


 


MCH   Cancelled 


 


MCHC   Cancelled 


 


RDW   Cancelled 


 


Plt Count   Cancelled 


 


Seg Neutrophils %   Cancelled 


 


Retic Count (auto)   Cancelled 


 


Absolute Retic   Cancelled 


 


Sodium    127.9 L


 


Potassium    5.7 H


 


Chloride    98


 


Carbon Dioxide    23


 


Anion Gap    7


 


BUN    63 H


 


Creatinine    2.36 H


 


Est GFR ( Amer)    34 L


 


Est GFR (Non-Af Amer)   


 


Glucose    113 H


 


Calcium    8.9


 


Phosphorus    4.5


 


Magnesium    2.4 H


 


Iron    88.5


 


TIBC    407


 


% Saturation    22


 


Ferritin    29.60


 


Ammonia   


 


Vitamin B12    > 1000.0 H


 


Folate    19.60


 


Blood Type  A POSITIVE  


 


Antibody Screen  NEGATIVE  














  01/08/21 01/08/21 01/08/21





  10:05 20:33 20:33


 


WBC  6.6   6.9


 


RBC  2.15 L   2.50 L


 


Hgb  7.0 L   7.9 L


 


Hct  20.8 L   23.4 L


 


MCV  97   94


 


MCH  32.4   31.7


 


MCHC  33.5   33.8


 


RDW  18.6 H   20.8 H


 


Plt Count  76 L   


 


Seg Neutrophils %  67.3   69.4


 


Retic Count (auto)   


 


Absolute Retic   


 


Sodium   


 


Potassium   


 


Chloride   


 


Carbon Dioxide   


 


Anion Gap   


 


BUN   


 


Creatinine   


 


Est GFR ( Amer)   


 


Est GFR (Non-Af Amer)   


 


Glucose   


 


Calcium   


 


Phosphorus   


 


Magnesium   


 


Iron   


 


TIBC   


 


% Saturation   


 


Ferritin   


 


Ammonia   91.0 H 


 


Vitamin B12   


 


Folate   


 


Blood Type   


 


Antibody Screen   














  01/09/21 01/09/21





  05:47 05:53


 


WBC   6.8


 


RBC   2.38 L


 


Hgb   7.6 L


 


Hct   22.4 L


 


MCV   94


 


MCH   32.0


 


MCHC   34.0


 


RDW   20.7 H


 


Plt Count   69 L


 


Seg Neutrophils %   63.6


 


Retic Count (auto)  


 


Absolute Retic  


 


Sodium  Cancelled 


 


Potassium  Cancelled 


 


Chloride  Cancelled 


 


Carbon Dioxide  Cancelled 


 


Anion Gap  Cancelled 


 


BUN  Cancelled 


 


Creatinine  Cancelled 


 


Est GFR ( Amer)  Cancelled 


 


Est GFR (Non-Af Amer)  Cancelled 


 


Glucose  Cancelled 


 


Calcium  Cancelled 


 


Phosphorus  Cancelled 


 


Magnesium  Cancelled 


 


Iron  


 


TIBC  


 


% Saturation  


 


Ferritin  


 


Ammonia  


 


Vitamin B12  


 


Folate  


 


Blood Type  


 


Antibody Screen  








                                        











  01/07/21 01/07/21 01/07/21





  16:50 16:50 16:50


 


Creatine Kinase  116  


 


CK-MB (CK-2)   2.38 


 


Troponin I   < 0.012 


 


NT-Pro-B Natriuret Pep    1760 H











Impressions: 


                                        





Chest X-Ray  01/07/21 16:47


IMPRESSION:  Moderate cardiomegaly with mild pulmonary edema.  No focal 

consolidation.


 














Assessment & Plan





- Diagnosis


(1) Chronic liver disease and cirrhosis


Is this a current diagnosis for this admission?: Yes   





(2) Thrombocytopenia


Is this a current diagnosis for this admission?: Yes   





(3) Anemia


Qualifiers: 


   Anemia type: iron deficiency   Iron deficiency anemia type: unspecified iron 

deficiency   Qualified Code(s): D50.9 - Iron deficiency anemia, unspecified   


Is this a current diagnosis for this admission?: Yes   





(4) Acute kidney injury


Is this a current diagnosis for this admission?: Yes   





- Time


Anticipated Discharge Disposition: Home with Home Health


Anticipated Discharge Timeframe: when ready





- Plan Summary


Plan Summary: 





Assessment:





Alcoholic cirrhosis


Anemia


Hemoglobin hematocrit 7.6 and 22.4, unchanged since administration of 1 unit of 

blood (7.9 and 24.4, respectively)


Abdomen soft


No blood per rectum or hematemesis reported during the past 24 hours


Patient currently receiving a bowel prep  by mouth








Plan:





EGD and colonoscopy today


Seizure, risks, benefits, complications, alternatives, explained to the patient,

he understands all the above, he decides to proceed

## 2021-01-09 NOTE — RADIOLOGY REPORT (SQ)
EXAM DESCRIPTION:  U/S ABDOMEN LIMITED W/O DOP



IMAGES COMPLETED DATE/TIME:  1/8/2021 8:03 pm



REASON FOR STUDY:  cirrhosis



COMPARISON:  None.



TECHNIQUE:  Dynamic and static grayscale images acquired of the abdomen and recorded on PACS. Additio
nal selected color Doppler and spectral images recorded.



LIMITATIONS:  Portable exam.  Obesity.



FINDINGS:  PANCREAS: Not visualized.  Poor acoustical windows

LIVER: Normal size fatty infiltration. No focal masses.

LIVER VASCULATURE: Normal directional flow of the main portal vein and hepatic veins.

GALLBLADDER: TIRADS

ULTRASOUND-DETECTED MALLOY'S SIGN: Negative.

INTRAHEPATIC DUCTS AND COMMON DUCT: CBD and intrahepatic ducts normal caliber. No filling defects.

INFERIOR VENA CAVA: Normal flow.

AORTA: No aneurysm.

RIGHT KIDNEY:  Normal size.   Normal echogenicity.   No solid or suspicious masses.   No hydronephros
is.   No calcifications.

PERITONEAL AND RIGHT PLEURAL SPACE: No ascites or effusions.

OTHER: No other significant findings.



IMPRESSION:  Fatty liver without other significant findings.



TECHNICAL DOCUMENTATION:  JOB ID:  [id number]

 2011 [employer]- All Rights Reserved



[place] location - IP/workstation name: [email or web address]

## 2021-01-10 LAB
ADD MANUAL DIFF: NO
ANION GAP SERPL CALC-SCNC: 7 MMOL/L (ref 5–19)
BASOPHILS # BLD AUTO: 0.1 10^3/UL (ref 0–0.2)
BASOPHILS NFR BLD AUTO: 1 % (ref 0–2)
BUN SERPL-MCNC: 55 MG/DL (ref 7–20)
CALCIUM: 8.9 MG/DL (ref 8.4–10.2)
CHLORIDE SERPL-SCNC: 97 MMOL/L (ref 98–107)
CO2 SERPL-SCNC: 23 MMOL/L (ref 22–30)
EOSINOPHIL # BLD AUTO: 0.2 10^3/UL (ref 0–0.6)
EOSINOPHIL NFR BLD AUTO: 4.2 % (ref 0–6)
ERYTHROCYTE [DISTWIDTH] IN BLOOD BY AUTOMATED COUNT: 20.2 % (ref 11.5–14)
GLUCOSE SERPL-MCNC: 105 MG/DL (ref 75–110)
HCT VFR BLD CALC: 22.5 % (ref 37.9–51)
HGB BLD-MCNC: 7.5 G/DL (ref 13.5–17)
LYMPHOCYTES # BLD AUTO: 1 10^3/UL (ref 0.5–4.7)
LYMPHOCYTES NFR BLD AUTO: 18.1 % (ref 13–45)
MCH RBC QN AUTO: 31.1 PG (ref 27–33.4)
MCHC RBC AUTO-ENTMCNC: 33.2 G/DL (ref 32–36)
MCV RBC AUTO: 94 FL (ref 80–97)
MONOCYTES # BLD AUTO: 0.9 10^3/UL (ref 0.1–1.4)
MONOCYTES NFR BLD AUTO: 16.7 % (ref 3–13)
NEUTROPHILS # BLD AUTO: 3.4 10^3/UL (ref 1.7–8.2)
NEUTS SEG NFR BLD AUTO: 60 % (ref 42–78)
PLATELET # BLD: 55 10^3/UL (ref 150–450)
POTASSIUM SERPL-SCNC: 5.2 MMOL/L (ref 3.6–5)
RBC # BLD AUTO: 2.4 10^6/UL (ref 4.35–5.55)
TOTAL CELLS COUNTED % (AUTO): 100 %
WBC # BLD AUTO: 5.7 10^3/UL (ref 4–10.5)

## 2021-01-10 RX ADMIN — HYDROCORTISONE ACETATE SCH: 25 SUPPOSITORY RECTAL at 10:05

## 2021-01-10 RX ADMIN — FUROSEMIDE SCH MG: 10 INJECTION, SOLUTION INTRAMUSCULAR; INTRAVENOUS at 17:21

## 2021-01-10 RX ADMIN — GABAPENTIN SCH: 300 CAPSULE ORAL at 15:46

## 2021-01-10 RX ADMIN — GABAPENTIN SCH MG: 300 CAPSULE ORAL at 17:21

## 2021-01-10 RX ADMIN — GABAPENTIN SCH MG: 300 CAPSULE ORAL at 10:05

## 2021-01-10 RX ADMIN — COLCHICINE SCH MG: 0.6 TABLET, FILM COATED ORAL at 10:05

## 2021-01-10 RX ADMIN — FUROSEMIDE SCH MG: 10 INJECTION, SOLUTION INTRAMUSCULAR; INTRAVENOUS at 10:04

## 2021-01-10 RX ADMIN — CARVEDILOL SCH: 12.5 TABLET, FILM COATED ORAL at 10:05

## 2021-01-10 RX ADMIN — Medication SCH TAB: at 10:05

## 2021-01-10 RX ADMIN — RIFAXIMIN SCH MG: 550 TABLET ORAL at 21:23

## 2021-01-10 RX ADMIN — SODIUM CHLORIDE TAB 1 GM SCH GM: 1 TAB at 05:54

## 2021-01-10 RX ADMIN — PANTOPRAZOLE SODIUM SCH MG: 40 INJECTION, POWDER, FOR SOLUTION INTRAVENOUS at 10:04

## 2021-01-10 RX ADMIN — MULTIVITAMIN TABLET SCH TAB: TABLET at 10:09

## 2021-01-10 RX ADMIN — SODIUM CHLORIDE TAB 1 GM SCH GM: 1 TAB at 17:21

## 2021-01-10 RX ADMIN — CARVEDILOL SCH MG: 12.5 TABLET, FILM COATED ORAL at 21:23

## 2021-01-10 RX ADMIN — LIDOCAINE SCH: 50 PATCH CUTANEOUS at 10:08

## 2021-01-10 RX ADMIN — LACTULOSE SCH: 20 SOLUTION ORAL at 10:05

## 2021-01-10 RX ADMIN — HYDROCORTISONE ACETATE SCH: 25 SUPPOSITORY RECTAL at 17:20

## 2021-01-10 NOTE — PDOC PROGRESS REPORT
Subjective


Date:: 01/10/21


Subjective:: 





"SULY VILLALPANDO is a 60 year old male with past medical history significant for 

cirrhosis, chronic diastolic CHF, CKD 3B, chronic bilateral lower extremity 

edema, chronic blood loss anemia, hemorrhoids, chronic vision loss due to 

childhood trauma who presents to the ED with a 1 week history of progressive 

shortness of breath and generalized weakness.  Patient states he has been seeing

bright red blood on the toilet paper when he wipes after bowel movements 

although he admits this has been going on for years and seems to be worse 

lately.  He states he followed with GI outpatient in the past but this was 

before he moved here from [place] approximately 6 months ago.  Patient had a 

recent admission on 9/2020 for hyponatremia and he was taken off spironolactone.

 Patient states he has still been taking this medication along with Lasix 

however.  Sodium is lower than previous value at discharge, currently at 126.5. 

He also has ISSAC with creatinine up to 2.41 and he states he has been gaining 

weight and taking on fluid in his abdomen and legs which is worse than usual.  H

e previously have a history of alcohol abuse but states he has not drank in 

quite some time now.  General surgery consulted on admission and planning upper 

and lower endoscopies after patient is transfused to hemoglobin greater than 7. 

Transfusing 1 unit PRBC on admission followed by IV Lasix.  We also have him on 

sodium chloride tablets.  He is highly likely to continue having chronic 

hyponatremia due to cirrhosis and chronic volume overload and this will be 

challenging to control with diuretics and other medications."





D1 hospital stay.Care assumed today. Patient was seen and examined at bedside. 

He states that he is very nauseous, but otherwise he feels that his leg swelling

has gone down. Denies any chest pain, SOB, abdominal pain. Per surgery recs, 

plan is for colonoscopy tomorrow. S/p 1 unit PRBC with a repeat hgb of 7, 

additional unit ordered. 





D2 hospital stay. Patient was seen and examined at bedside. He is much more 

awake and alert. No new complains besides diarrhea due to the bowel prep. 

Ammonia level 91 however he is awake and alert so will just continue to monitor 

his mental status. Hgb stable at 7.6. He is awaiting EGD and colonoscopy today.





D3 Hospital stay 1/10/21 Patient was seen and examined at bedside. No new 

complains, still with minimal black stools likely a residual from the GI bleed. 

VS otherwise stable, no new complains. Possible discharge tomorrow. 


Reason For Visit: 


ACUTE HYPONATREMIA,CHRONIC LOWER GI BLEEDING,








Physical Exam


Vital Signs: 


                                        











Temp Pulse Resp BP Pulse Ox


 


 98.1 F   69   16   124/55 L  95 


 


 01/10/21 15:50  01/10/21 15:50  01/10/21 15:50  01/10/21 15:50  01/10/21 15:50








                                 Intake & Output











 01/09/21 01/10/21 01/11/21





 06:59 06:59 06:59


 


Intake Total 1350 3575 610


 


Output Total 550 0 100


 


Balance 800 3575 510


 


Weight 141.022 kg 144.7 kg 











General appearance: PRESENT: no acute distress, cooperative


Head exam: PRESENT: atraumatic, normocephalic


Eye exam: PRESENT: other - legally blind one eye


Mouth exam: PRESENT: moist


Neck exam: PRESENT: full ROM


Respiratory exam: PRESENT: clear to auscultation karma, symmetrical, unlabored


Cardiovascular exam: PRESENT: RRR, +S1, +S2


GI/Abdominal exam: PRESENT: normal bowel sounds, soft.  ABSENT: rebound, 

tenderness


Extremities exam: PRESENT: full ROM, +2 edema


Musculoskeletal exam: PRESENT: full ROM


Neurological exam: PRESENT: alert, awake, oriented to person, oriented to place,

oriented to time, oriented to situation


Psychiatric exam: PRESENT: normal mood


Skin exam: PRESENT: normal color





Results


Laboratory Results: 


                                        





                                 01/10/21 05:08 





                                 01/10/21 05:08 





                                        











  01/10/21 01/10/21





  05:08 05:08


 


WBC  5.7 


 


RBC  2.40 L 


 


Hgb  7.5 L 


 


Hct  22.5 L 


 


MCV  94 


 


MCH  31.1 


 


MCHC  33.2 


 


RDW  20.2 H 


 


Plt Count  55 L 


 


Seg Neutrophils %  60.0 


 


Sodium   127.2 L


 


Potassium   5.2 H


 


Chloride   97 L


 


Carbon Dioxide   23


 


Anion Gap   7


 


BUN   55 H


 


Creatinine   2.21 H


 


Est GFR (African Amer)   37 L


 


Glucose   105


 


Calcium   8.9








                                        











  01/07/21 01/07/21 01/07/21





  16:50 16:50 16:50


 


Creatine Kinase  116  


 


CK-MB (CK-2)   2.38 


 


Troponin I   < 0.012 


 


NT-Pro-B Natriuret Pep    1760 H











Impressions: 


                                        





Chest X-Ray  01/07/21 16:47


IMPRESSION:  Moderate cardiomegaly with mild pulmonary edema.  No focal 

consolidation.


 








Abdomen Ultrasound  01/08/21 00:00


IMPRESSION:  Fatty liver without other significant findings.


 














Assessment and Plan





- Diagnosis


(1) Acute GI bleeding


Is this a current diagnosis for this admission?: Yes   


Plan: 





Likely acute on chronic lower GI bleeding 


Hemoglobin down to 6.8 >7.0>7.6 


- s/p 2 unit PRBC, baseline 8


General surgery consulted: s/p colonoscopy bleeding colonic diverticulum at 65 

cm from the anal verge few scattered sigmoid colon diverticuli epinephrine 

injection was done. NO carices


oral PPI


- carb meal pattern


Anusol


Needs follow-up with local GI discharge








(2) Acute kidney injury superimposed on CKD


Is this a current diagnosis for this admission?: Yes   


Plan: 


- Crea 2.3>2.37>2.21baseline about 1.2


Possible also has volume overload due to cirrhosis


 Pre renal or hepatorenal


- continue lasix for now due to hypervolemic hyponatremia


-will CTM








(3) Acute on chronic blood loss anemia


Is this a current diagnosis for this admission?: Yes   


Plan: 





Suspect due to chronic hemorrhoidal bleed but also at risk for upper GI bleed 

due to cirrhosis


s/p 2 unit pRBC hgb incerased to 7.6


- s/p colonoscopy


- will CTM








(4) Hemorrhoids


Qualifiers: 


   Hemorrhoid type: unspecified   Qualified Code(s): K64.9 - Unspecified 

hemorrhoids   


Is this a current diagnosis for this admission?: Yes   


Plan: 





May benefit from hemorrhoidal banding outpatient








(5) Alcoholic cirrhosis of liver


Qualifiers: 


   Ascites presence: unspecified   Qualified Code(s): K70.30 - Alcoholic 

cirrhosis of liver without ascites   


Is this a current diagnosis for this admission?: Yes   


Plan: 





Chronic cirrhosis


Taken off Aldactone during last admission 9/2020 due to severe hyponatremia 

however patient states he continues to take this medication outpatient


- US RUQ showed fatty liver


IV Lasix, transition to p.o. discharge


Continue rifaximin and lactulose


GI follow-up outpatient








(6) Bilateral lower extremity edema


Is this a current diagnosis for this admission?: Yes   


Plan: 


- 2/2 to cirrhosis


- continue lasix








(7) Hyperkalemia


Is this a current diagnosis for this admission?: Yes   


Plan: 





Likely due to acute renal failure


- should improve with Lasix








(8) Hyponatremia


Is this a current diagnosis for this admission?: Yes   


Plan: 


- 127>129>127


Likely due to volume overload in setting of chronic cirrhosis


Salt tablets, IV Lasix








(9) Thrombocytopenia


Is this a current diagnosis for this admission?: Yes   


Plan: 


76>69>55


Chronic








- Time


Time Spent with patient: 25-34 minutes


Medications reviewed and adjusted accordingly: Yes


Anticipated Discharge Disposition: Home, Self Care


Anticipated Discharge Timeframe: within 24 hours

## 2021-01-11 LAB
ABSOLUTE LYMPHOCYTES# (MANUAL): 0.9 10^3/UL (ref 0.5–4.7)
ABSOLUTE MONOCYTES # (MANUAL): 0.9 10^3/UL (ref 0.1–1.4)
ADD MANUAL DIFF: NO
ADD MANUAL DIFF: YES
ANISOCYTOSIS BLD QL SMEAR: (no result)
BASOPHILS # BLD AUTO: 0 10^3/UL (ref 0–0.2)
BASOPHILS NFR BLD AUTO: 0.9 % (ref 0–2)
BASOPHILS NFR BLD MANUAL: 1 % (ref 0–2)
EOSINOPHIL # BLD AUTO: 0.1 10^3/UL (ref 0–0.6)
EOSINOPHIL NFR BLD AUTO: 2.9 % (ref 0–6)
EOSINOPHIL NFR BLD MANUAL: 1 % (ref 0–6)
ERYTHROCYTE [DISTWIDTH] IN BLOOD BY AUTOMATED COUNT: 19.8 % (ref 11.5–14)
ERYTHROCYTE [DISTWIDTH] IN BLOOD BY AUTOMATED COUNT: 20.8 % (ref 11.5–14)
HCT VFR BLD CALC: 22.4 % (ref 37.9–51)
HCT VFR BLD CALC: 23.4 % (ref 37.9–51)
HGB BLD-MCNC: 7.5 G/DL (ref 13.5–17)
HGB BLD-MCNC: 8 G/DL (ref 13.5–17)
LYMPHOCYTES # BLD AUTO: 0.8 10^3/UL (ref 0.5–4.7)
LYMPHOCYTES NFR BLD AUTO: 16.9 % (ref 13–45)
MCH RBC QN AUTO: 31.3 PG (ref 27–33.4)
MCH RBC QN AUTO: 31.5 PG (ref 27–33.4)
MCHC RBC AUTO-ENTMCNC: 33.5 G/DL (ref 32–36)
MCHC RBC AUTO-ENTMCNC: 34 G/DL (ref 32–36)
MCV RBC AUTO: 92 FL (ref 80–97)
MCV RBC AUTO: 94 FL (ref 80–97)
MONOCYTES # BLD AUTO: 0.8 10^3/UL (ref 0.1–1.4)
MONOCYTES % (MANUAL): 17 % (ref 3–13)
MONOCYTES NFR BLD AUTO: 16.1 % (ref 3–13)
NEUTROPHILS # BLD AUTO: 3.1 10^3/UL (ref 1.7–8.2)
NEUTS SEG NFR BLD AUTO: 63.2 % (ref 42–78)
PLATELET # BLD: 63 10^3/UL (ref 150–450)
PLATELET # BLD: 69 10^3/UL (ref 150–450)
PLATELET COMMENT: (no result)
POLYCHROMASIA BLD QL SMEAR: SLIGHT
RBC # BLD AUTO: 2.38 10^6/UL (ref 4.35–5.55)
RBC # BLD AUTO: 2.55 10^6/UL (ref 4.35–5.55)
SCHISTOCYTES BLD QL SMEAR: SLIGHT
SEGMENTED NEUTROPHILS % (MAN): 65 % (ref 42–78)
TARGETS BLD QL SMEAR: SLIGHT
TOTAL CELLS COUNTED % (AUTO): 100 %
TOTAL CELLS COUNTED BLD: 100
VARIANT LYMPHS NFR BLD MANUAL: 16 % (ref 13–45)
WBC # BLD AUTO: 4.9 10^3/UL (ref 4–10.5)
WBC # BLD AUTO: 5.5 10^3/UL (ref 4–10.5)

## 2021-01-11 RX ADMIN — FUROSEMIDE SCH MG: 10 INJECTION, SOLUTION INTRAMUSCULAR; INTRAVENOUS at 10:11

## 2021-01-11 RX ADMIN — PANTOPRAZOLE SODIUM SCH MG: 40 TABLET, DELAYED RELEASE ORAL at 05:53

## 2021-01-11 RX ADMIN — HYDROCORTISONE ACETATE SCH: 25 SUPPOSITORY RECTAL at 17:44

## 2021-01-11 RX ADMIN — GABAPENTIN SCH MG: 300 CAPSULE ORAL at 17:50

## 2021-01-11 RX ADMIN — LACTULOSE SCH: 20 SOLUTION ORAL at 10:05

## 2021-01-11 RX ADMIN — GABAPENTIN SCH MG: 300 CAPSULE ORAL at 10:12

## 2021-01-11 RX ADMIN — LIDOCAINE SCH: 50 PATCH CUTANEOUS at 10:05

## 2021-01-11 RX ADMIN — SODIUM CHLORIDE TAB 1 GM SCH GM: 1 TAB at 05:53

## 2021-01-11 RX ADMIN — CARVEDILOL SCH MG: 12.5 TABLET, FILM COATED ORAL at 21:54

## 2021-01-11 RX ADMIN — CARVEDILOL SCH MG: 12.5 TABLET, FILM COATED ORAL at 10:12

## 2021-01-11 RX ADMIN — HYDROCORTISONE ACETATE SCH: 25 SUPPOSITORY RECTAL at 10:05

## 2021-01-11 RX ADMIN — RIFAXIMIN SCH MG: 550 TABLET ORAL at 21:55

## 2021-01-11 RX ADMIN — SODIUM CHLORIDE TAB 1 GM SCH GM: 1 TAB at 17:50

## 2021-01-11 RX ADMIN — Medication SCH TAB: at 10:12

## 2021-01-11 RX ADMIN — FUROSEMIDE SCH MG: 10 INJECTION, SOLUTION INTRAMUSCULAR; INTRAVENOUS at 17:50

## 2021-01-11 RX ADMIN — GABAPENTIN SCH MG: 300 CAPSULE ORAL at 14:18

## 2021-01-11 RX ADMIN — MULTIVITAMIN TABLET SCH TAB: TABLET at 10:12

## 2021-01-11 RX ADMIN — COLCHICINE SCH MG: 0.6 TABLET, FILM COATED ORAL at 10:12

## 2021-01-11 NOTE — PDOC PROGRESS REPORT
Subjective


Date:: 01/11/21


Subjective:: 





"SULY VILLALPANDO is a 60 year old male with past medical history significant for 

cirrhosis, chronic diastolic CHF, CKD 3B, chronic bilateral lower extremity 

edema, chronic blood loss anemia, hemorrhoids, chronic vision loss due to 

childhood trauma who presents to the ED with a 1 week history of progressive 

shortness of breath and generalized weakness.  Patient states he has been seeing

bright red blood on the toilet paper when he wipes after bowel movements 

although he admits this has been going on for years and seems to be worse 

lately.  He states he followed with GI outpatient in the past but this was 

before he moved here from [place] approximately 6 months ago.  Patient had a 

recent admission on 9/2020 for hyponatremia and he was taken off spironolactone.

 Patient states he has still been taking this medication along with Lasix 

however.  Sodium is lower than previous value at discharge, currently at 126.5. 

He also has ISSAC with creatinine up to 2.41 and he states he has been gaining 

weight and taking on fluid in his abdomen and legs which is worse than usual.  H

e previously have a history of alcohol abuse but states he has not drank in 

quite some time now.  General surgery consulted on admission and planning upper 

and lower endoscopies after patient is transfused to hemoglobin greater than 7. 

Transfusing 1 unit PRBC on admission followed by IV Lasix.  We also have him on 

sodium chloride tablets.  He is highly likely to continue having chronic 

hyponatremia due to cirrhosis and chronic volume overload and this will be 

challenging to control with diuretics and other medications."





D1 hospital stay.Care assumed today. Patient was seen and examined at bedside. 

He states that he is very nauseous, but otherwise he feels that his leg swelling

has gone down. Denies any chest pain, SOB, abdominal pain. Per surgery recs, 

plan is for colonoscopy tomorrow. S/p 1 unit PRBC with a repeat hgb of 7, 

additional unit ordered. 





D2 hospital stay. Patient was seen and examined at bedside. He is much more 

awake and alert. No new complains besides diarrhea due to the bowel prep. 

Ammonia level 91 however he is awake and alert so will just continue to monitor 

his mental status. Hgb stable at 7.6. He is awaiting EGD and colonoscopy today.





D3 Hospital stay 1/10/21 Patient was seen and examined at bedside. No new 

complains, still with minimal black stools likely a residual from the GI bleed. 

VS otherwise stable, no new complains. Possible discharge tomorrow. 





D4 hospital stay 1/11/21. Patient seen and examined. Was suppose to be 

discharged but noted to be more confused and apparently has  been refusing 

lactulose. Hgb 8.0 post 1 unit transfusion. Discharge cancelled, lactulose enema

ordered. 


Reason For Visit: 


ACUTE HYPONATREMIA,CHRONIC LOWER GI BLEEDING,








Physical Exam


Vital Signs: 


                                        











Temp Pulse Resp BP Pulse Ox


 


 98.1 F   66   22 H  116/44 L  100 


 


 01/11/21 15:30  01/11/21 15:30  01/11/21 15:30  01/11/21 15:30  01/11/21 15:30








                                 Intake & Output











 01/10/21 01/11/21 01/12/21





 06:59 06:59 06:59


 


Intake Total 3575 1260 1055


 


Output Total 0 1300 2000


 


Balance 3575 -40 -945


 


Weight 144.7 kg 145 kg 144.3 kg











General appearance: PRESENT: no acute distress, morbidly obese


Head exam: PRESENT: atraumatic, normocephalic


Mouth exam: PRESENT: moist


Neck exam: PRESENT: full ROM


Respiratory exam: PRESENT: clear to auscultation karma, symmetrical, unlabored


Cardiovascular exam: PRESENT: RRR, +S1, +S2


Pulses: PRESENT: +2 pedal pulses bilateral


GI/Abdominal exam: PRESENT: normal bowel sounds, soft.  ABSENT: rebound, 

tenderness


Extremities exam: PRESENT: full ROM


Musculoskeletal exam: PRESENT: full ROM


Neurological exam: PRESENT: alert, awake, oriented to person, oriented to place,

oriented to time, oriented to situation


Psychiatric exam: PRESENT: normal mood


Skin exam: PRESENT: normal color





Results


Laboratory Results: 


                                        





                                 01/11/21 18:38 





                                 01/10/21 05:08 





                                        











  01/11/21 01/11/21 01/11/21





  06:08 09:39 18:38


 


WBC  4.9   5.5


 


RBC  2.38 L   2.55 L


 


Hgb  7.5 L   8.0 L


 


Hct  22.4 L   23.4 L


 


MCV  94   92


 


MCH  31.5   31.3


 


MCHC  33.5   34.0


 


RDW  19.8 H   20.8 H


 


Plt Count  69 L   63 L


 


Seg Neutrophils %  63.2   Not Reportable


 


Blood Type   A POSITIVE 


 


Antibody Screen   NEGATIVE 








                                        











  01/07/21 01/07/21 01/07/21





  16:50 16:50 16:50


 


Creatine Kinase  116  


 


CK-MB (CK-2)   2.38 


 


Troponin I   < 0.012 


 


NT-Pro-B Natriuret Pep    1760 H











Impressions: 


                                        





Chest X-Ray  01/07/21 16:47


IMPRESSION:  Moderate cardiomegaly with mild pulmonary edema.  No focal 

consolidation.


 








Abdomen Ultrasound  01/08/21 00:00


IMPRESSION:  Fatty liver without other significant findings.


 














Assessment and Plan





- Diagnosis


(1) Acute metabolic encephalopathy


Is this a current diagnosis for this admission?: Yes   


Plan: 


Recheck ammonia


-Continue lactulose, rifaximin








(2) Acute GI bleeding


Is this a current diagnosis for this admission?: Yes   


Plan: 





Likely acute on chronic lower GI bleeding 


Hemoglobin down to 6.8 >7.0>7.6 


- s/p 3 unit PRBC, baseline 8


General surgery consulted: s/p colonoscopy bleeding colonic diverticulum at 65 

cm from the anal verge few scattered sigmoid colon diverticuli epinephrine 

injection was done. NO carices


oral PPI


- carb meal pattern


Anusol


Needs follow-up with local GI discharge








(3) Acute kidney injury superimposed on CKD


Is this a current diagnosis for this admission?: Yes   


Plan: 


- Crea 2.3>2.37>2.21baseline about 1.2


Possible also has volume overload due to cirrhosis


 Pre renal or hepatorenal


- continue lasix for now due to hypervolemic hyponatremia


-will CTM








(4) Acute on chronic blood loss anemia


Is this a current diagnosis for this admission?: Yes   


Plan: 





Suspect due to chronic hemorrhoidal bleed but also at risk for upper GI bleed 

due to cirrhosis


s/p 2 unit pRBC hgb incerased to 7.6


- s/p colonoscopy


- will CTM








(5) Hemorrhoids


Qualifiers: 


   Hemorrhoid type: unspecified   Qualified Code(s): K64.9 - Unspecified 

hemorrhoids   


Is this a current diagnosis for this admission?: Yes   


Plan: 





May benefit from hemorrhoidal banding outpatient








(6) Alcoholic cirrhosis of liver


Qualifiers: 


   Ascites presence: unspecified   Qualified Code(s): K70.30 - Alcoholic 

cirrhosis of liver without ascites   


Is this a current diagnosis for this admission?: Yes   


Plan: 





Chronic cirrhosis


Taken off Aldactone during last admission 9/2020 due to severe hyponatremia 

however patient states he continues to take this medication outpatient


- US RUQ showed fatty liver


IV Lasix, transition to p.o. discharge


Continue rifaximin and lactulose


GI follow-up outpatient








(7) Bilateral lower extremity edema


Is this a current diagnosis for this admission?: Yes   


Plan: 


- 2/2 to cirrhosis


- continue lasix








(8) Hyperkalemia


Is this a current diagnosis for this admission?: Yes   


Plan: 





Likely due to acute renal failure


- should improve with Lasix








(9) Hyponatremia


Is this a current diagnosis for this admission?: Yes   


Plan: 


- 127>129>127


Likely due to volume overload in setting of chronic cirrhosis


Salt tablets, IV Lasix








(10) Thrombocytopenia


Is this a current diagnosis for this admission?: Yes   


Plan: 


76>69>55


Chronic








- Time


Time Spent with patient: 25-34 minutes


Medications reviewed and adjusted accordingly: Yes


Anticipated Discharge Disposition: Home with Home Health


Anticipated Discharge Timeframe: tbd

## 2021-01-12 LAB
ABSOLUTE LYMPHOCYTES# (MANUAL): 1 10^3/UL (ref 0.5–4.7)
ABSOLUTE MONOCYTES # (MANUAL): 0.9 10^3/UL (ref 0.1–1.4)
ADD MANUAL DIFF: YES
ALBUMIN SERPL-MCNC: 2.9 G/DL (ref 3.5–5)
ALP SERPL-CCNC: 156 U/L (ref 38–126)
ANION GAP SERPL CALC-SCNC: 6 MMOL/L (ref 5–19)
ANISOCYTOSIS BLD QL SMEAR: (no result)
ARTERIAL BLOOD FIO2: (no result)
ARTERIAL BLOOD H2CO3: 1.08 MMOL/L (ref 1.05–1.35)
ARTERIAL BLOOD HCO3: 24.8 MMOL/L (ref 20–24)
ARTERIAL BLOOD PCO2: 36 MMHG (ref 35–45)
ARTERIAL BLOOD PH: 7.46 (ref 7.35–7.45)
ARTERIAL BLOOD PO2: 73.3 MMHG (ref 80–100)
ARTERIAL BLOOD TOTAL CO2: 25.9 MMOL/L (ref 23–27)
AST SERPL-CCNC: 60 U/L (ref 17–59)
BASE EXCESS BLDA CALC-SCNC: 1 MMOL/L
BASOPHILS NFR BLD MANUAL: 0 % (ref 0–2)
BILIRUB DIRECT SERPL-MCNC: 1 MG/DL (ref 0–0.4)
BILIRUB SERPL-MCNC: 2.5 MG/DL (ref 0.2–1.3)
BUN SERPL-MCNC: 43 MG/DL (ref 7–20)
CALCIUM: 9 MG/DL (ref 8.4–10.2)
CHLORIDE SERPL-SCNC: 99 MMOL/L (ref 98–107)
CO2 SERPL-SCNC: 24 MMOL/L (ref 22–30)
DACRYOCYTES BLD QL SMEAR: SLIGHT
EOSINOPHIL NFR BLD MANUAL: 5 % (ref 0–6)
ERYTHROCYTE [DISTWIDTH] IN BLOOD BY AUTOMATED COUNT: 21.3 % (ref 11.5–14)
GLUCOSE SERPL-MCNC: 124 MG/DL (ref 75–110)
HCT VFR BLD CALC: 24.3 % (ref 37.9–51)
HGB BLD-MCNC: 8.2 G/DL (ref 13.5–17)
MCH RBC QN AUTO: 31.4 PG (ref 27–33.4)
MCHC RBC AUTO-ENTMCNC: 33.8 G/DL (ref 32–36)
MCV RBC AUTO: 93 FL (ref 80–97)
MONOCYTES % (MANUAL): 20 % (ref 3–13)
OVALOCYTES BLD QL SMEAR: (no result)
PLATELET # BLD: 61 10^3/UL (ref 150–450)
PLATELET COMMENT: (no result)
POIKILOCYTOSIS BLD QL SMEAR: (no result)
POLYCHROMASIA BLD QL SMEAR: (no result)
POTASSIUM SERPL-SCNC: 4.9 MMOL/L (ref 3.6–5)
PROT SERPL-MCNC: 6.2 G/DL (ref 6.3–8.2)
RBC # BLD AUTO: 2.61 10^6/UL (ref 4.35–5.55)
SAO2 % BLDA: 95.5 % (ref 94–98)
SCHISTOCYTES BLD QL SMEAR: SLIGHT
SEGMENTED NEUTROPHILS % (MAN): 53 % (ref 42–78)
TARGETS BLD QL SMEAR: SLIGHT
TOTAL CELLS COUNTED BLD: 100
VARIANT LYMPHS NFR BLD MANUAL: 19 % (ref 13–45)
WBC # BLD AUTO: 4.7 10^3/UL (ref 4–10.5)

## 2021-01-12 RX ADMIN — CARVEDILOL SCH MG: 12.5 TABLET, FILM COATED ORAL at 09:56

## 2021-01-12 RX ADMIN — MULTIVITAMIN TABLET SCH TAB: TABLET at 09:56

## 2021-01-12 RX ADMIN — HYDROCORTISONE ACETATE SCH: 25 SUPPOSITORY RECTAL at 17:49

## 2021-01-12 RX ADMIN — LACTULOSE SCH GM: 20 SOLUTION ORAL at 10:05

## 2021-01-12 RX ADMIN — RIFAXIMIN SCH MG: 550 TABLET ORAL at 22:22

## 2021-01-12 RX ADMIN — LACTULOSE SCH GM: 20 SOLUTION ORAL at 17:23

## 2021-01-12 RX ADMIN — SODIUM CHLORIDE TAB 1 GM SCH GM: 1 TAB at 17:22

## 2021-01-12 RX ADMIN — HYDROCORTISONE ACETATE SCH: 25 SUPPOSITORY RECTAL at 10:02

## 2021-01-12 RX ADMIN — LIDOCAINE SCH: 50 PATCH CUTANEOUS at 10:03

## 2021-01-12 RX ADMIN — FUROSEMIDE SCH MG: 10 INJECTION, SOLUTION INTRAMUSCULAR; INTRAVENOUS at 17:23

## 2021-01-12 RX ADMIN — GABAPENTIN SCH MG: 300 CAPSULE ORAL at 09:56

## 2021-01-12 RX ADMIN — FUROSEMIDE SCH MG: 10 INJECTION, SOLUTION INTRAMUSCULAR; INTRAVENOUS at 09:55

## 2021-01-12 RX ADMIN — COLCHICINE SCH MG: 0.6 TABLET, FILM COATED ORAL at 09:56

## 2021-01-12 RX ADMIN — Medication SCH TAB: at 09:57

## 2021-01-12 RX ADMIN — CARVEDILOL SCH MG: 12.5 TABLET, FILM COATED ORAL at 22:22

## 2021-01-12 RX ADMIN — SODIUM CHLORIDE TAB 1 GM SCH GM: 1 TAB at 06:34

## 2021-01-12 RX ADMIN — GABAPENTIN SCH MG: 300 CAPSULE ORAL at 17:22

## 2021-01-12 RX ADMIN — GABAPENTIN SCH MG: 300 CAPSULE ORAL at 13:23

## 2021-01-12 RX ADMIN — PANTOPRAZOLE SODIUM SCH MG: 40 TABLET, DELAYED RELEASE ORAL at 06:34

## 2021-01-12 NOTE — PDOC PROGRESS REPORT
Subjective


Date:: 01/12/21


Subjective:: 





"SULY VILLALPANDO is a 60 year old male with past medical history significant for 

cirrhosis, chronic diastolic CHF, CKD 3B, chronic bilateral lower extremity 

edema, chronic blood loss anemia, hemorrhoids, chronic vision loss due to 

childhood trauma who presents to the ED with a 1 week history of progressive 

shortness of breath and generalized weakness.  Patient states he has been seeing

bright red blood on the toilet paper when he wipes after bowel movements 

although he admits this has been going on for years and seems to be worse 

lately.  He states he followed with GI outpatient in the past but this was 

before he moved here from [place] approximately 6 months ago.  Patient had a 

recent admission on 9/2020 for hyponatremia and he was taken off spironolactone.

 Patient states he has still been taking this medication along with Lasix 

however.  Sodium is lower than previous value at discharge, currently at 126.5. 

He also has ISSAC with creatinine up to 2.41 and he states he has been gaining 

weight and taking on fluid in his abdomen and legs which is worse than usual.  H

e previously have a history of alcohol abuse but states he has not drank in 

quite some time now.  General surgery consulted on admission and planning upper 

and lower endoscopies after patient is transfused to hemoglobin greater than 7. 

Transfusing 1 unit PRBC on admission followed by IV Lasix.  We also have him on 

sodium chloride tablets.  He is highly likely to continue having chronic 

hyponatremia due to cirrhosis and chronic volume overload and this will be 

challenging to control with diuretics and other medications."





D1 hospital stay.Care assumed today. Patient was seen and examined at bedside. 

He states that he is very nauseous, but otherwise he feels that his leg swelling

has gone down. Denies any chest pain, SOB, abdominal pain. Per surgery recs, 

plan is for colonoscopy tomorrow. S/p 1 unit PRBC with a repeat hgb of 7, 

additional unit ordered. 





D2 hospital stay. Patient was seen and examined at bedside. He is much more 

awake and alert. No new complains besides diarrhea due to the bowel prep. 

Ammonia level 91 however he is awake and alert so will just continue to monitor 

his mental status. Hgb stable at 7.6. He is awaiting EGD and colonoscopy today.





D3 Hospital stay 1/10/21 Patient was seen and examined at bedside. No new 

complains, still with minimal black stools likely a residual from the GI bleed. 

VS otherwise stable, no new complains. Possible discharge tomorrow. 





D4 hospital stay 1/11/21. Patient seen and examined. Was suppose to be 

discharged but noted to be more confused and apparently has  been refusing 

lactulose. Hgb 8.0 post 1 unit transfusion. Discharge cancelled, lactulose enema

ordered. 





D5 hospital stay 1/12/21 Patient was seen and examined at bedside. Still a bit 

confused but overall better from yesterday. He had a BM yesterday after the 

enema. Nurse was also concerned that his legs were red and erythematous. I 

inspected his legs again and they look no different than when he came in, he has

chronic venous stasis dermatitis. Although his legs seems to have gotten a bit 

more swollen so I increased his lasix to 40 IV BID. will need to closely watch 

his creatinine with this increased dose. 











Reason For Visit: 


ACUTE HYPONATREMIA,CHRONIC LOWER GI BLEEDING,








Physical Exam


Vital Signs: 


                                        











Temp Pulse Resp BP Pulse Ox


 


 97.8 F   63   20   127/60 H  98 


 


 01/12/21 12:50  01/12/21 12:50  01/12/21 12:50  01/12/21 12:50  01/12/21 12:50








                                 Intake & Output











 01/11/21 01/12/21 01/13/21





 06:59 06:59 06:59


 


Intake Total 1260 1055 120


 


Output Total 1300 2400 


 


Balance -40 -1345 120


 


Weight 145 kg 142.6 kg 142.6 kg











General appearance: PRESENT: no acute distress, cooperative, hard of hearing, 

morbidly obese


Head exam: PRESENT: atraumatic


Ear exam: PRESENT: drainage, normal external ear exam


Mouth exam: PRESENT: moist


Neck exam: PRESENT: full ROM


Respiratory exam: PRESENT: clear to auscultation karma, symmetrical, unlabored


Cardiovascular exam: PRESENT: RRR, +S1, +S2


Pulses: PRESENT: +2 pedal pulses bilateral


GI/Abdominal exam: PRESENT: normal bowel sounds, soft.  ABSENT: rebound, 

tenderness


Extremities exam: PRESENT: full ROM


Musculoskeletal exam: PRESENT: full ROM


Neurological exam: PRESENT: alert, awake, oriented to time.  ABSENT: oriented to

person, oriented to place


Psychiatric exam: PRESENT: normal mood





Results


Laboratory Results: 


                                        





                                 01/12/21 09:52 





                                 01/12/21 09:52 





                                        











  01/11/21 01/11/21 01/12/21





  18:38 20:41 05:55


 


WBC  5.5  


 


RBC  2.55 L  


 


Hgb  8.0 L  


 


Hct  23.4 L  


 


MCV  92  


 


MCH  31.3  


 


MCHC  34.0  


 


RDW  20.8 H  


 


Plt Count  63 L  


 


Seg Neutrophils %  Not Reportable  


 


Carbonic Acid    1.08


 


HCO3/H2CO3 Ratio    22:1


 


ABG pH    7.46 H


 


ABG pCO2    36.0


 


ABG pO2    73.3 L


 


ABG HCO3    24.8 H


 


ABG O2 Saturation    95.5


 


ABG Base Excess    1.0


 


FiO2    21%


 


Sodium   


 


Potassium   


 


Chloride   


 


Carbon Dioxide   


 


Anion Gap   


 


BUN   


 


Creatinine   


 


Est GFR (African Amer)   


 


Glucose   


 


Calcium   


 


Total Bilirubin   


 


AST   


 


Alkaline Phosphatase   


 


Ammonia   174.4 H 


 


Total Protein   


 


Albumin   














  01/12/21 01/12/21 01/12/21





  09:52 09:52 09:52


 


WBC  4.7  


 


RBC  2.61 L  


 


Hgb  8.2 L  


 


Hct  24.3 L  


 


MCV  93  


 


MCH  31.4  


 


MCHC  33.8  


 


RDW  21.3 H  


 


Plt Count  61 L  


 


Seg Neutrophils %  Not Reportable  


 


Carbonic Acid   


 


HCO3/H2CO3 Ratio   


 


ABG pH   


 


ABG pCO2   


 


ABG pO2   


 


ABG HCO3   


 


ABG O2 Saturation   


 


ABG Base Excess   


 


FiO2   


 


Sodium   129.1 L 


 


Potassium   4.9 


 


Chloride   99 


 


Carbon Dioxide   24 


 


Anion Gap   6 


 


BUN   43 H 


 


Creatinine   2.19 H 


 


Est GFR ( Amer)   37 L 


 


Glucose   124 H 


 


Calcium   9.0 


 


Total Bilirubin   2.5 H 


 


AST   60 H 


 


Alkaline Phosphatase   156 H 


 


Ammonia    89.0 H


 


Total Protein   6.2 L 


 


Albumin   2.9 L 








                                        











  01/07/21 01/07/21 01/07/21





  16:50 16:50 16:50


 


Creatine Kinase  116  


 


CK-MB (CK-2)   2.38 


 


Troponin I   < 0.012 


 


NT-Pro-B Natriuret Pep    1760 H











Impressions: 


                                        





Chest X-Ray  01/07/21 16:47


IMPRESSION:  Moderate cardiomegaly with mild pulmonary edema.  No focal 

consolidation.


 








Abdomen Ultrasound  01/08/21 00:00


IMPRESSION:  Fatty liver without other significant findings.


 














Assessment and Plan





- Diagnosis


(1) Acute metabolic encephalopathy


Is this a current diagnosis for this admission?: Yes   


Plan: 


ammonia 174 patient apparently ahs been refusing his lactulose the past 2 days


- lactulose dose increased to BID to titrate BM 2-3/day


-Continue lactulose, rifaximin








(2) Acute GI bleeding


Is this a current diagnosis for this admission?: Yes   


Plan: 





Likely acute on chronic lower GI bleeding 


Hemoglobin down to 6.8 >7.0>7.6 


- s/p 3 unit PRBC, baseline 8


General surgery consulted: s/p colonoscopy bleeding colonic diverticulum at 65 

cm from the anal verge few scattered sigmoid colon diverticuli epinephrine 

injection was done. NO carices


oral PPI


Anusol


Needs follow-up with local GI discharge








(3) Acute kidney injury superimposed on CKD


Is this a current diagnosis for this admission?: Yes   


Plan: 


- Crea 2.3>2.37>2.21 baseline about 1.2


Possible also has volume overload due to cirrhosis


 Pre renal or hepatorenal


- continue lasix for now due to hypervolemic hyponatremia


-will CTM








(4) Acute on chronic blood loss anemia


Is this a current diagnosis for this admission?: Yes   


Plan: 





Suspect due to chronic hemorrhoidal bleed but also at risk for upper GI bleed 

due to cirrhosis


s/p 3 unit pRBC hgb increased to 7.6>8.2


- s/p colonoscopy


- will CTM








(5) Hemorrhoids


Qualifiers: 


   Hemorrhoid type: unspecified   Qualified Code(s): K64.9 - Unspecified 

hemorrhoids   


Is this a current diagnosis for this admission?: Yes   


Plan: 





May benefit from hemorrhoidal banding outpatient








(6) Alcoholic cirrhosis of liver


Qualifiers: 


   Ascites presence: unspecified   Qualified Code(s): K70.30 - Alcoholic 

cirrhosis of liver without ascites   


Is this a current diagnosis for this admission?: Yes   


Plan: 





Chronic cirrhosis


Taken off Aldactone during last admission 9/2020 due to severe hyponatremia 

however patient states he continues to take this medication outpatient


- US RUQ showed fatty liver


IV Lasix increased to 40 IV BID please monitor crea, transition to p.o. 

discharge


Continue rifaximin and lactulose


GI follow-up outpatient








(7) Bilateral lower extremity edema


Is this a current diagnosis for this admission?: Yes   


Plan: 


- 2/2 to cirrhosis


- continue lasix








(8) Hyperkalemia


Is this a current diagnosis for this admission?: Yes   


Plan: 





Likely due to acute renal failure


- should improve with Lasix








(9) Hyponatremia


Is this a current diagnosis for this admission?: Yes   


Plan: 


- 127>129>127


Likely due to volume overload in setting of chronic cirrhosis


Salt tablets, IV Lasix








(10) Thrombocytopenia


Is this a current diagnosis for this admission?: Yes   


Plan: 


76>69>55


Chronic








- Time


Time Spent with patient: 25-34 minutes


Medications reviewed and adjusted accordingly: Yes


Anticipated Discharge Disposition: Home with Home Health


Anticipated Discharge Timeframe: tbd

## 2021-01-13 LAB
ALBUMIN SERPL-MCNC: 2.9 G/DL (ref 3.5–5)
ALP SERPL-CCNC: 145 U/L (ref 38–126)
ANION GAP SERPL CALC-SCNC: 7 MMOL/L (ref 5–19)
AST SERPL-CCNC: 82 U/L (ref 17–59)
BILIRUB DIRECT SERPL-MCNC: 0.8 MG/DL (ref 0–0.4)
BILIRUB SERPL-MCNC: 1.8 MG/DL (ref 0.2–1.3)
BUN SERPL-MCNC: 37 MG/DL (ref 7–20)
CALCIUM: 9.1 MG/DL (ref 8.4–10.2)
CHLORIDE SERPL-SCNC: 99 MMOL/L (ref 98–107)
CO2 SERPL-SCNC: 27 MMOL/L (ref 22–30)
GLUCOSE SERPL-MCNC: 167 MG/DL (ref 75–110)
POTASSIUM SERPL-SCNC: 4.5 MMOL/L (ref 3.6–5)
PROT SERPL-MCNC: 6.3 G/DL (ref 6.3–8.2)

## 2021-01-13 RX ADMIN — FUROSEMIDE SCH MG: 10 INJECTION, SOLUTION INTRAMUSCULAR; INTRAVENOUS at 10:01

## 2021-01-13 RX ADMIN — LACTULOSE SCH GM: 20 SOLUTION ORAL at 15:53

## 2021-01-13 RX ADMIN — PANTOPRAZOLE SODIUM SCH MG: 40 TABLET, DELAYED RELEASE ORAL at 06:30

## 2021-01-13 RX ADMIN — GABAPENTIN SCH MG: 300 CAPSULE ORAL at 09:58

## 2021-01-13 RX ADMIN — Medication SCH TAB: at 09:59

## 2021-01-13 RX ADMIN — FUROSEMIDE SCH MG: 10 INJECTION, SOLUTION INTRAMUSCULAR; INTRAVENOUS at 20:00

## 2021-01-13 RX ADMIN — GABAPENTIN SCH: 300 CAPSULE ORAL at 15:48

## 2021-01-13 RX ADMIN — HYDROCORTISONE ACETATE SCH: 25 SUPPOSITORY RECTAL at 18:46

## 2021-01-13 RX ADMIN — GABAPENTIN SCH: 300 CAPSULE ORAL at 18:46

## 2021-01-13 RX ADMIN — HYDROCORTISONE ACETATE SCH: 25 SUPPOSITORY RECTAL at 10:02

## 2021-01-13 RX ADMIN — LIDOCAINE SCH: 50 PATCH CUTANEOUS at 10:02

## 2021-01-13 RX ADMIN — CARVEDILOL SCH MG: 12.5 TABLET, FILM COATED ORAL at 09:57

## 2021-01-13 RX ADMIN — RIFAXIMIN SCH MG: 550 TABLET ORAL at 22:05

## 2021-01-13 RX ADMIN — CARVEDILOL SCH MG: 12.5 TABLET, FILM COATED ORAL at 22:05

## 2021-01-13 RX ADMIN — COLCHICINE SCH MG: 0.6 TABLET, FILM COATED ORAL at 09:58

## 2021-01-13 RX ADMIN — LACTULOSE SCH GM: 20 SOLUTION ORAL at 09:59

## 2021-01-13 RX ADMIN — MULTIVITAMIN TABLET SCH TAB: TABLET at 09:57

## 2021-01-13 RX ADMIN — SODIUM CHLORIDE TAB 1 GM SCH GM: 1 TAB at 06:30

## 2021-01-13 RX ADMIN — LACTULOSE SCH GM: 20 SOLUTION ORAL at 22:05

## 2021-01-13 NOTE — PDOC PROGRESS REPORT
Subjective


Date:: 01/13/21


Subjective:: 





He denies any shortness of breath today.  Denies any chest pain or pains anywher

e else.  Had not had any bowel movements by earlier this afternoon.  Still 

seemed confused


Reason For Visit: 


ACUTE HYPONATREMIA,CHRONIC LOWER GI BLEEDING,








Physical Exam


Vital Signs: 


                                        











Temp Pulse Resp BP Pulse Ox


 


 98.2 F   69   21 H  136/57 H  96 


 


 01/13/21 12:15  01/13/21 12:15  01/13/21 12:15  01/13/21 12:15  01/13/21 12:15








                                 Intake & Output











 01/12/21 01/13/21 01/14/21





 06:59 06:59 06:59


 


Intake Total 1055 640 130


 


Output Total 2400  


 


Balance -1345 640 130


 


Weight 142.6 kg 138.1 kg 











General appearance: PRESENT: no acute distress, cooperative, morbidly obese


Respiratory exam: PRESENT: clear to auscultation karma, symmetrical, unlabored.  

ABSENT: tachypnea, wheezes


Cardiovascular exam: PRESENT: RRR, +S1, +S2.  ABSENT: tachycardia


GI/Abdominal exam: PRESENT: soft.  ABSENT: rebound, rigid, tenderness


Neurological exam: PRESENT: alert, awake, oriented to person, oriented to place.

 ABSENT: oriented to time, oriented to situation





Results


Laboratory Results: 


                                        





                                 01/12/21 09:52 





                                 01/13/21 11:25 





                                        











  01/13/21 01/13/21





  11:25 11:25


 


Sodium  133.1 L 


 


Potassium  4.5 


 


Chloride  99 


 


Carbon Dioxide  27 


 


Anion Gap  7 


 


BUN  37 H 


 


Creatinine  1.99 H 


 


Est GFR (African Amer)  42 L 


 


Glucose  167 H 


 


Calcium  9.1 


 


Total Bilirubin  1.8 H 


 


AST  82 H 


 


Alkaline Phosphatase  145 H 


 


Ammonia   215.6 H


 


Total Protein  6.3 


 


Albumin  2.9 L 








                                        











  01/07/21 01/07/21 01/07/21





  16:50 16:50 16:50


 


Creatine Kinase  116  


 


CK-MB (CK-2)   2.38 


 


Troponin I   < 0.012 


 


NT-Pro-B Natriuret Pep    1760 H











Impressions: 


                                        





Chest X-Ray  01/07/21 16:47


IMPRESSION:  Moderate cardiomegaly with mild pulmonary edema.  No focal 

consolidation.


 








Abdomen Ultrasound  01/08/21 00:00


IMPRESSION:  Fatty liver without other significant findings.


 














Assessment and Plan





- Diagnosis


(1) Hepatic encephalopathy


Is this a current diagnosis for this admission?: Yes   





(2) Acute GI bleeding


Is this a current diagnosis for this admission?: Yes   





(3) Acute kidney injury superimposed on CKD


Is this a current diagnosis for this admission?: Yes   





(4) Acute on chronic blood loss anemia


Is this a current diagnosis for this admission?: Yes   





(5) Alcoholic cirrhosis of liver


Qualifiers: 


   Ascites presence: unspecified   Qualified Code(s): K70.30 - Alcoholic 

cirrhosis of liver without ascites   


Is this a current diagnosis for this admission?: Yes   





(6) Bilateral lower extremity edema


Is this a current diagnosis for this admission?: Yes   








(8) Hyperkalemia


Is this a current diagnosis for this admission?: Yes   








(10) Thrombocytopenia


Is this a current diagnosis for this admission?: Yes   





- Plan Summary


Summary: 


Patient still having encephalopathy.  His creatinine is improving down to 1.99 

today.  Hyponatremia is also improved to 133.


His ammonia actually went up to 215.  My discussion with the nurse patient only 

had one bowel movement over the past 24 hours.


I have increased his lactulose to 40 mg 3 times daily.  I have also given him an

extra dose of rifaximin today.


Continue to monitor ammonia levels and CMP.


Continue diuresis with Lasix


Monitor I's and O's


Likely his encephalopathy was propagated by his recent acute GI bleed from a 

bleeding diverticulum





- Time


Time Spent with patient: 15-24 minutes


Anticipated Discharge Disposition: Home with Home Health


Anticipated Discharge Timeframe: within 36 hours

## 2021-01-14 LAB
ALBUMIN SERPL-MCNC: 2.9 G/DL (ref 3.5–5)
ALP SERPL-CCNC: 158 U/L (ref 38–126)
ANION GAP SERPL CALC-SCNC: 7 MMOL/L (ref 5–19)
AST SERPL-CCNC: 80 U/L (ref 17–59)
BILIRUB DIRECT SERPL-MCNC: 0.8 MG/DL (ref 0–0.4)
BILIRUB SERPL-MCNC: 2.2 MG/DL (ref 0.2–1.3)
BUN SERPL-MCNC: 32 MG/DL (ref 7–20)
CALCIUM: 9.3 MG/DL (ref 8.4–10.2)
CHLORIDE SERPL-SCNC: 101 MMOL/L (ref 98–107)
CO2 SERPL-SCNC: 26 MMOL/L (ref 22–30)
GLUCOSE SERPL-MCNC: 125 MG/DL (ref 75–110)
POTASSIUM SERPL-SCNC: 4.2 MMOL/L (ref 3.6–5)
PROT SERPL-MCNC: 6.3 G/DL (ref 6.3–8.2)

## 2021-01-14 RX ADMIN — LACTULOSE SCH GM: 20 SOLUTION ORAL at 13:01

## 2021-01-14 RX ADMIN — GUAIFENESIN PRN MG: 200 SOLUTION ORAL at 13:01

## 2021-01-14 RX ADMIN — PANTOPRAZOLE SODIUM SCH MG: 40 TABLET, DELAYED RELEASE ORAL at 05:17

## 2021-01-14 RX ADMIN — LIDOCAINE SCH: 50 PATCH CUTANEOUS at 10:49

## 2021-01-14 RX ADMIN — GUAIFENESIN PRN MG: 200 SOLUTION ORAL at 02:24

## 2021-01-14 RX ADMIN — RIFAXIMIN SCH MG: 550 TABLET ORAL at 22:48

## 2021-01-14 RX ADMIN — Medication SCH TAB: at 10:40

## 2021-01-14 RX ADMIN — GABAPENTIN SCH MG: 300 CAPSULE ORAL at 10:40

## 2021-01-14 RX ADMIN — RIFAXIMIN SCH MG: 550 TABLET ORAL at 10:40

## 2021-01-14 RX ADMIN — HYDROCORTISONE ACETATE SCH MG: 25 SUPPOSITORY RECTAL at 17:36

## 2021-01-14 RX ADMIN — COLCHICINE SCH MG: 0.6 TABLET, FILM COATED ORAL at 10:40

## 2021-01-14 RX ADMIN — CARVEDILOL SCH MG: 12.5 TABLET, FILM COATED ORAL at 22:48

## 2021-01-14 RX ADMIN — INSULIN LISPRO SCH: 100 INJECTION, SOLUTION INTRAVENOUS; SUBCUTANEOUS at 22:49

## 2021-01-14 RX ADMIN — MULTIVITAMIN TABLET SCH TAB: TABLET at 10:40

## 2021-01-14 RX ADMIN — FUROSEMIDE SCH MG: 10 INJECTION, SOLUTION INTRAMUSCULAR; INTRAVENOUS at 17:36

## 2021-01-14 RX ADMIN — LIDOCAINE SCH PATCH: 50 PATCH CUTANEOUS at 10:41

## 2021-01-14 RX ADMIN — LACTULOSE SCH GM: 20 SOLUTION ORAL at 22:48

## 2021-01-14 RX ADMIN — GABAPENTIN SCH MG: 300 CAPSULE ORAL at 17:36

## 2021-01-14 RX ADMIN — LACTULOSE SCH GM: 20 SOLUTION ORAL at 05:18

## 2021-01-14 RX ADMIN — CARVEDILOL SCH MG: 12.5 TABLET, FILM COATED ORAL at 10:40

## 2021-01-14 RX ADMIN — INSULIN LISPRO SCH UNIT: 100 INJECTION, SOLUTION INTRAVENOUS; SUBCUTANEOUS at 17:37

## 2021-01-14 RX ADMIN — HYDROCORTISONE ACETATE SCH MG: 25 SUPPOSITORY RECTAL at 10:40

## 2021-01-14 RX ADMIN — FUROSEMIDE SCH MG: 10 INJECTION, SOLUTION INTRAMUSCULAR; INTRAVENOUS at 10:40

## 2021-01-14 RX ADMIN — GABAPENTIN SCH MG: 300 CAPSULE ORAL at 13:01

## 2021-01-14 NOTE — PDOC PROGRESS REPORT
Subjective


Date:: 01/14/21


Subjective:: 





Still confused but less confused than yesterday.  He denies shortness of breath 

or pain anywhere.  Denies fever or chills.  He did have a large sized bowel 

movement last night and one this morning.  He also had 3 incontinence episodes 

yesterday.


Reason For Visit: 


ACUTE HYPONATREMIA,CHRONIC LOWER GI BLEEDING,








Physical Exam


Vital Signs: 


                                        











Temp Pulse Resp BP Pulse Ox


 


 98.7 F   72   19   125/61   96 


 


 01/14/21 12:00  01/14/21 12:00  01/14/21 12:00  01/14/21 12:00  01/14/21 12:00








                                 Intake & Output











 01/13/21 01/14/21 01/15/21





 06:59 06:59 06:59


 


Intake Total 640 1100 


 


Output Total  2800 


 


Balance 640 -1700 


 


Weight 138.1 kg 299 kg 138.1 kg











General appearance: PRESENT: no acute distress, cooperative


Neck exam: ABSENT: JVD


Respiratory exam: PRESENT: symmetrical, unlabored.  ABSENT: tachypnea, wheezes


Cardiovascular exam: PRESENT: RRR, +S1, +S2.  ABSENT: tachycardia


GI/Abdominal exam: PRESENT: distended, soft, tenderness.  ABSENT: rebound, rigid


Extremities exam: PRESENT: pedal edema, +2 edema


Neurological exam: PRESENT: alert, awake, oriented to person, oriented to place,

oriented to time.  ABSENT: oriented to situation


Psychiatric exam: ABSENT: agitated, anxious





Results


Laboratory Results: 


                                        





                                 01/12/21 09:52 





                                 01/14/21 06:07 





                                        











  01/14/21 01/14/21





  06:07 06:07


 


Sodium  133.6 L 


 


Potassium  4.2 


 


Chloride  101 


 


Carbon Dioxide  26 


 


Anion Gap  7 


 


BUN  32 H 


 


Creatinine  1.80 H 


 


Est GFR (African Amer)  47 L 


 


Glucose  125 H 


 


Calcium  9.3 


 


Total Bilirubin  2.2 H 


 


AST  80 H 


 


Alkaline Phosphatase  158 H 


 


Ammonia   66.3 H


 


Total Protein  6.3 


 


Albumin  2.9 L 








                                        











  01/07/21 01/07/21 01/07/21





  16:50 16:50 16:50


 


Creatine Kinase  116  


 


CK-MB (CK-2)   2.38 


 


Troponin I   < 0.012 


 


NT-Pro-B Natriuret Pep    1760 H











Impressions: 


                                        





Chest X-Ray  01/07/21 16:47


IMPRESSION:  Moderate cardiomegaly with mild pulmonary edema.  No focal consolid

ation.


 








Abdomen Ultrasound  01/08/21 00:00


IMPRESSION:  Fatty liver without other significant findings.


 














Assessment and Plan





- Diagnosis


(1) Hepatic encephalopathy


Is this a current diagnosis for this admission?: Yes   





(2) Acute GI bleeding


Is this a current diagnosis for this admission?: Yes   





(3) Acute kidney injury superimposed on CKD


Is this a current diagnosis for this admission?: Yes   





(4) Acute on chronic blood loss anemia


Is this a current diagnosis for this admission?: Yes   





(5) Alcoholic cirrhosis of liver


Qualifiers: 


   Ascites presence: unspecified   Qualified Code(s): K70.30 - Alcoholic 

cirrhosis of liver without ascites   


Is this a current diagnosis for this admission?: Yes   





(6) Bilateral lower extremity edema


Is this a current diagnosis for this admission?: Yes   








(8) Hyperkalemia


Is this a current diagnosis for this admission?: Yes   








(10) Thrombocytopenia


Is this a current diagnosis for this admission?: Yes   





- Plan Summary


Summary: 


Patient still having encephalopathy.  His creatinine is improving down to 1.99 

today.  Hyponatremia is also improved to 133.


His ammonia actually went up to 215.  My discussion with the nurse patient only 

had one bowel movement over the past 24 hours.


I have increased his lactulose to 40 mg 3 times daily.  I have also given him an

extra dose of rifaximin today.


Continue to monitor ammonia levels and CMP.


Continue diuresis with Lasix


Monitor I's and O's


Likely his encephalopathy was propagated by his recent acute GI bleed from a 

bleeding diverticulum





1/14/2021


His ammonia has improved today down to the 60s.  His mental status is a little 

bit better but he still somewhat confused.  He also appears to be quite 

debilitated.  I will have physical therapy work with him.  We will try to get 

him out of bed in a chair today.  He is diuresing very well on the Lasix which 

will be continued.  He is still quite edematous in his legs and extremities.  

His creatinine is improved on labs today.  Continue lactulose 40 mg 3 times 

daily and rifaximin dose twice daily.  We will continue to monitor his mental 

status for improvement.  H&H has been stable.





- Time


Time Spent with patient: 15-24 minutes


Anticipated Discharge Disposition: Home with Home Health


Anticipated Discharge Timeframe: within 48 hours

## 2021-01-15 LAB
ALBUMIN SERPL-MCNC: 2.6 G/DL (ref 3.5–5)
ALP SERPL-CCNC: 130 U/L (ref 38–126)
ANION GAP SERPL CALC-SCNC: 5 MMOL/L (ref 5–19)
AST SERPL-CCNC: 69 U/L (ref 17–59)
BILIRUB DIRECT SERPL-MCNC: 0.6 MG/DL (ref 0–0.4)
BILIRUB SERPL-MCNC: 1.8 MG/DL (ref 0.2–1.3)
BUN SERPL-MCNC: 25 MG/DL (ref 7–20)
CALCIUM: 9 MG/DL (ref 8.4–10.2)
CHLORIDE SERPL-SCNC: 98 MMOL/L (ref 98–107)
CO2 SERPL-SCNC: 30 MMOL/L (ref 22–30)
ERYTHROCYTE [DISTWIDTH] IN BLOOD BY AUTOMATED COUNT: 20.3 % (ref 11.5–14)
GLUCOSE SERPL-MCNC: 119 MG/DL (ref 75–110)
HCT VFR BLD CALC: 24.7 % (ref 37.9–51)
HGB BLD-MCNC: 8.4 G/DL (ref 13.5–17)
MCH RBC QN AUTO: 31.3 PG (ref 27–33.4)
MCHC RBC AUTO-ENTMCNC: 33.9 G/DL (ref 32–36)
MCV RBC AUTO: 92 FL (ref 80–97)
PLATELET # BLD: 58 10^3/UL (ref 150–450)
POTASSIUM SERPL-SCNC: 3.6 MMOL/L (ref 3.6–5)
PROT SERPL-MCNC: 5.8 G/DL (ref 6.3–8.2)
RBC # BLD AUTO: 2.68 10^6/UL (ref 4.35–5.55)
WBC # BLD AUTO: 4.8 10^3/UL (ref 4–10.5)

## 2021-01-15 RX ADMIN — FUROSEMIDE SCH MG: 10 INJECTION, SOLUTION INTRAMUSCULAR; INTRAVENOUS at 10:25

## 2021-01-15 RX ADMIN — HYDROCORTISONE ACETATE SCH: 25 SUPPOSITORY RECTAL at 10:27

## 2021-01-15 RX ADMIN — MAGNESIUM SULFATE IN DEXTROSE SCH MLS/HR: 10 INJECTION, SOLUTION INTRAVENOUS at 10:22

## 2021-01-15 RX ADMIN — LIDOCAINE SCH PATCH: 50 PATCH CUTANEOUS at 10:26

## 2021-01-15 RX ADMIN — PANTOPRAZOLE SODIUM SCH MG: 40 TABLET, DELAYED RELEASE ORAL at 06:23

## 2021-01-15 RX ADMIN — LACTULOSE SCH: 20 SOLUTION ORAL at 22:44

## 2021-01-15 RX ADMIN — RIFAXIMIN SCH MG: 550 TABLET ORAL at 22:32

## 2021-01-15 RX ADMIN — GUAIFENESIN PRN MG: 200 SOLUTION ORAL at 10:32

## 2021-01-15 RX ADMIN — Medication SCH TAB: at 10:26

## 2021-01-15 RX ADMIN — MULTIVITAMIN TABLET SCH TAB: TABLET at 10:25

## 2021-01-15 RX ADMIN — CARVEDILOL SCH MG: 12.5 TABLET, FILM COATED ORAL at 10:26

## 2021-01-15 RX ADMIN — INSULIN LISPRO SCH UNIT: 100 INJECTION, SOLUTION INTRAVENOUS; SUBCUTANEOUS at 11:58

## 2021-01-15 RX ADMIN — MAGNESIUM SULFATE IN DEXTROSE SCH MLS/HR: 10 INJECTION, SOLUTION INTRAVENOUS at 09:10

## 2021-01-15 RX ADMIN — INSULIN LISPRO SCH UNIT: 100 INJECTION, SOLUTION INTRAVENOUS; SUBCUTANEOUS at 17:21

## 2021-01-15 RX ADMIN — COLCHICINE SCH MG: 0.6 TABLET, FILM COATED ORAL at 10:25

## 2021-01-15 RX ADMIN — MAGNESIUM SULFATE IN DEXTROSE SCH MLS/HR: 10 INJECTION, SOLUTION INTRAVENOUS at 07:37

## 2021-01-15 RX ADMIN — LACTULOSE SCH GM: 20 SOLUTION ORAL at 06:22

## 2021-01-15 RX ADMIN — GABAPENTIN SCH MG: 300 CAPSULE ORAL at 14:11

## 2021-01-15 RX ADMIN — CARVEDILOL SCH MG: 12.5 TABLET, FILM COATED ORAL at 22:32

## 2021-01-15 RX ADMIN — GUAIFENESIN PRN MG: 200 SOLUTION ORAL at 03:49

## 2021-01-15 RX ADMIN — INSULIN LISPRO SCH: 100 INJECTION, SOLUTION INTRAVENOUS; SUBCUTANEOUS at 07:34

## 2021-01-15 RX ADMIN — HYDROCORTISONE ACETATE SCH: 25 SUPPOSITORY RECTAL at 17:22

## 2021-01-15 RX ADMIN — MAGNESIUM OXIDE TAB 400 MG (241.3 MG ELEMENTAL MG) SCH MG: 400 (241.3 MG) TAB at 17:20

## 2021-01-15 RX ADMIN — GABAPENTIN SCH MG: 300 CAPSULE ORAL at 10:25

## 2021-01-15 RX ADMIN — LACTULOSE SCH GM: 20 SOLUTION ORAL at 14:11

## 2021-01-15 RX ADMIN — MAGNESIUM SULFATE IN DEXTROSE SCH MLS/HR: 10 INJECTION, SOLUTION INTRAVENOUS at 11:58

## 2021-01-15 RX ADMIN — RIFAXIMIN SCH MG: 550 TABLET ORAL at 10:26

## 2021-01-15 RX ADMIN — INSULIN LISPRO SCH: 100 INJECTION, SOLUTION INTRAVENOUS; SUBCUTANEOUS at 21:33

## 2021-01-15 RX ADMIN — FUROSEMIDE SCH MG: 40 TABLET ORAL at 17:20

## 2021-01-15 RX ADMIN — GABAPENTIN SCH MG: 300 CAPSULE ORAL at 17:20

## 2021-01-15 NOTE — PDOC PROGRESS REPORT
Subjective


Date:: 01/15/21


Subjective:: 





Today patient's mental status is back to baseline.  He is no longer confused.  H

e denies any shortness of breath or pains.  Still swollen in his lower 

extremities well to slowly improving  Had a conversation with his sister who was

at bedside.  Discussed the plan of care going forward with both of them.  


Reason For Visit: 


ACUTE HYPONATREMIA,CHRONIC LOWER GI BLEEDING,








Physical Exam


Vital Signs: 


                                        











Temp Pulse Resp BP Pulse Ox


 


 97.5 F   66   15   117/48 L  98 


 


 01/15/21 12:00  01/15/21 12:00  01/15/21 12:00  01/15/21 12:00  01/15/21 12:00








                                 Intake & Output











 01/14/21 01/15/21 01/16/21





 06:59 06:59 06:59


 


Intake Total 1100 1400 920


 


Output Total 2800 3850 1400


 


Balance -1700 -2450 -480


 


Weight 299 kg 126.1 kg 











General appearance: PRESENT: no acute distress, cooperative


Neck exam: ABSENT: JVD


Respiratory exam: PRESENT: clear to auscultation karma, symmetrical, unlabored.  

ABSENT: tachypnea, wheezes


Cardiovascular exam: PRESENT: RRR, +S1, +S2.  ABSENT: tachycardia


GI/Abdominal exam: PRESENT: soft.  ABSENT: rebound, rigid, tenderness


Extremities exam: PRESENT: +2 edema


Neurological exam: PRESENT: alert, awake, oriented to person, oriented to place,

oriented to time, oriented to situation


Psychiatric exam: ABSENT: agitated, anxious





Results


Laboratory Results: 


                                        





                                 01/15/21 05:14 





                                 01/15/21 05:14 





                                        











  01/15/21 01/15/21 01/15/21





  05:14 05:14 05:14


 


WBC  4.8  


 


RBC  2.68 L  


 


Hgb  8.4 L  


 


Hct  24.7 L  


 


MCV  92  


 


MCH  31.3  


 


MCHC  33.9  


 


RDW  20.3 H  


 


Plt Count  58 L  


 


Sodium   133.1 L 


 


Potassium   3.6 


 


Chloride   98 


 


Carbon Dioxide   30 


 


Anion Gap   5 


 


BUN   25 H 


 


Creatinine   1.64 H 


 


Est GFR ( Amer)   52 L 


 


Glucose   119 H 


 


Calcium   9.0 


 


Magnesium   1.1 L* 


 


Total Bilirubin   1.8 H 


 


AST   69 H 


 


Alkaline Phosphatase   130 H 


 


Ammonia    77.3 H


 


Total Protein   5.8 L 


 


Albumin   2.6 L 








                                        











  01/07/21 01/07/21 01/07/21





  16:50 16:50 16:50


 


Creatine Kinase  116  


 


CK-MB (CK-2)   2.38 


 


Troponin I   < 0.012 


 


NT-Pro-B Natriuret Pep    1760 H











Impressions: 


                                        





Chest X-Ray  01/07/21 16:47


IMPRESSION:  Moderate cardiomegaly with mild pulmonary edema.  No focal 

consolidation.


 








Abdomen Ultrasound  01/08/21 00:00


IMPRESSION:  Fatty liver without other significant findings.


 














Assessment and Plan





- Diagnosis


(1) Hepatic encephalopathy


Is this a current diagnosis for this admission?: Yes   





(2) Acute GI bleeding


Is this a current diagnosis for this admission?: Yes   





(3) Acute kidney injury superimposed on CKD


Is this a current diagnosis for this admission?: Yes   





(4) Acute on chronic blood loss anemia


Is this a current diagnosis for this admission?: Yes   





(5) Alcoholic cirrhosis of liver


Qualifiers: 


   Ascites presence: unspecified   Qualified Code(s): K70.30 - Alcoholic 

cirrhosis of liver without ascites   


Is this a current diagnosis for this admission?: Yes   





(6) Bilateral lower extremity edema


Is this a current diagnosis for this admission?: Yes   








(8) Hyperkalemia


Is this a current diagnosis for this admission?: Yes   








(10) Thrombocytopenia


Is this a current diagnosis for this admission?: Yes   





- Plan Summary


Summary: 


Patient still having encephalopathy.  His creatinine is improving down to 1.99 

today.  Hyponatremia is also improved to 133.


His ammonia actually went up to 215.  My discussion with the nurse patient only 

had one bowel movement over the past 24 hours.


I have increased his lactulose to 40 mg 3 times daily.  I have also given him an

extra dose of rifaximin today.


Continue to monitor ammonia levels and CMP.


Continue diuresis with Lasix


Monitor I's and O's


Likely his encephalopathy was propagated by his recent acute GI bleed from a 

bleeding diverticulum





1/14/2021


His ammonia has improved today down to the 60s.  His mental status is a little 

bit better but he still somewhat confused.  He also appears to be quite 

debilitated.  I will have physical therapy work with him.  We will try to get 

him out of bed in a chair today.  He is diuresing very well on the Lasix which 

will be continued.  He is still quite edematous in his legs and extremities.  

His creatinine is improved on labs today.  Continue lactulose 40 mg 3 times 

daily and rifaximin dose twice daily.  We will continue to monitor his mental 

status for improvement.  H&H has been stable.





1/15/2021


Patient is back to his baseline mental status.  His confusion has resolved.  He 

did have about 4 bowel movements yesterday.  He continues to make good urine and

renal function is improved.  Continue rifaximin outs twice daily dosing and 

current lactulose dose.  Discussed with him heavily to not resume Aldactone upon

discharge.  I also spoke with his sister about her care plan of care.  He will 

be following up with his gastroenterologist at [place].  We will monitor 

him for today and ensure he is adequate.  He has worked with physical therapy 

and he has been set up for home health.  I will switch his Lasix from IV to p.o.

 He is receiving repletion with IV magnesium sulfate 4 g due to his 

hypomagnesemia.  We will check more labs in the morning prior to discharge 

tomorrow.





- Time


Time Spent with patient: 15-24 minutes


Anticipated Discharge Disposition: Home with Home Health


Anticipated Discharge Timeframe: within 24 hours

## 2021-01-16 VITALS — SYSTOLIC BLOOD PRESSURE: 140 MMHG | DIASTOLIC BLOOD PRESSURE: 60 MMHG

## 2021-01-16 LAB
ALBUMIN SERPL-MCNC: 2.7 G/DL (ref 3.5–5)
ALP SERPL-CCNC: 172 U/L (ref 38–126)
ANION GAP SERPL CALC-SCNC: 6 MMOL/L (ref 5–19)
AST SERPL-CCNC: 74 U/L (ref 17–59)
BILIRUB DIRECT SERPL-MCNC: 0.7 MG/DL (ref 0–0.4)
BILIRUB SERPL-MCNC: 1.6 MG/DL (ref 0.2–1.3)
BUN SERPL-MCNC: 21 MG/DL (ref 7–20)
CALCIUM: 8.9 MG/DL (ref 8.4–10.2)
CHLORIDE SERPL-SCNC: 98 MMOL/L (ref 98–107)
CO2 SERPL-SCNC: 30 MMOL/L (ref 22–30)
GLUCOSE SERPL-MCNC: 114 MG/DL (ref 75–110)
POTASSIUM SERPL-SCNC: 4 MMOL/L (ref 3.6–5)
PROT SERPL-MCNC: 5.9 G/DL (ref 6.3–8.2)

## 2021-01-16 RX ADMIN — MULTIVITAMIN TABLET SCH TAB: TABLET at 09:26

## 2021-01-16 RX ADMIN — GABAPENTIN SCH MG: 300 CAPSULE ORAL at 09:26

## 2021-01-16 RX ADMIN — HYDROCORTISONE ACETATE SCH: 25 SUPPOSITORY RECTAL at 09:33

## 2021-01-16 RX ADMIN — COLCHICINE SCH MG: 0.6 TABLET, FILM COATED ORAL at 09:26

## 2021-01-16 RX ADMIN — LIDOCAINE SCH: 50 PATCH CUTANEOUS at 09:33

## 2021-01-16 RX ADMIN — CARVEDILOL SCH MG: 12.5 TABLET, FILM COATED ORAL at 09:27

## 2021-01-16 RX ADMIN — PANTOPRAZOLE SODIUM SCH MG: 40 TABLET, DELAYED RELEASE ORAL at 05:14

## 2021-01-16 RX ADMIN — GUAIFENESIN PRN MG: 200 SOLUTION ORAL at 05:13

## 2021-01-16 RX ADMIN — FUROSEMIDE SCH MG: 40 TABLET ORAL at 09:26

## 2021-01-16 RX ADMIN — Medication SCH TAB: at 09:27

## 2021-01-16 RX ADMIN — LACTULOSE SCH: 20 SOLUTION ORAL at 05:14

## 2021-01-16 RX ADMIN — INSULIN LISPRO SCH: 100 INJECTION, SOLUTION INTRAVENOUS; SUBCUTANEOUS at 11:19

## 2021-01-16 RX ADMIN — MAGNESIUM OXIDE TAB 400 MG (241.3 MG ELEMENTAL MG) SCH MG: 400 (241.3 MG) TAB at 09:26

## 2021-01-16 RX ADMIN — RIFAXIMIN SCH MG: 550 TABLET ORAL at 09:27

## 2021-01-16 RX ADMIN — MAGNESIUM SULFATE IN DEXTROSE SCH MLS/HR: 10 INJECTION, SOLUTION INTRAVENOUS at 10:45

## 2021-01-16 RX ADMIN — INSULIN LISPRO SCH: 100 INJECTION, SOLUTION INTRAVENOUS; SUBCUTANEOUS at 07:15

## 2021-01-16 RX ADMIN — MAGNESIUM SULFATE IN DEXTROSE SCH MLS/HR: 10 INJECTION, SOLUTION INTRAVENOUS at 09:28

## 2021-01-16 NOTE — PDOC DISCHARGE SUMMARY
Impression





- Admit/DC Date/PCP


Admission Date/Primary Care Provider: 


  01/08/21 13:05





  LEIF TELLEZ MD





Discharge Date: 01/16/21





- Discharge Diagnosis


(1) Acute GI bleeding


Is this a current diagnosis for this admission?: Yes   





(2) Hepatic encephalopathy


Is this a current diagnosis for this admission?: Yes   





(3) Acute kidney injury superimposed on CKD


Is this a current diagnosis for this admission?: Yes   





(4) Acute on chronic blood loss anemia


Is this a current diagnosis for this admission?: Yes   





(5) Alcoholic cirrhosis of liver


Is this a current diagnosis for this admission?: Yes   





(6) Bilateral lower extremity edema


Is this a current diagnosis for this admission?: Yes   





(7) Hyperammonemia


Is this a current diagnosis for this admission?: Yes   





(8) Hyperkalemia


Is this a current diagnosis for this admission?: Yes   





(9) Hypomagnesemia


Is this a current diagnosis for this admission?: Yes   





(10) Thrombocytopenia


Is this a current diagnosis for this admission?: Yes   





- Assessment


Summary: 


Patient still having encephalopathy.  His creatinine is improving down to 1.99 

today.  Hyponatremia is also improved to 133.


His ammonia actually went up to 215.  My discussion with the nurse patient only 

had one bowel movement over the past 24 hours.


I have increased his lactulose to 40 mg 3 times daily.  I have also given him an

extra dose of rifaximin today.


Continue to monitor ammonia levels and CMP.


Continue diuresis with Lasix


Monitor I's and O's


Likely his encephalopathy was propagated by his recent acute GI bleed from a 

bleeding diverticulum





1/14/2021


His ammonia has improved today down to the 60s.  His mental status is a little 

bit better but he still somewhat confused.  He also appears to be quite 

debilitated.  I will have physical therapy work with him.  We will try to get 

him out of bed in a chair today.  He is diuresing very well on the Lasix which 

will be continued.  He is still quite edematous in his legs and extremities.  

His creatinine is improved on labs today.  Continue lactulose 40 mg 3 times 

daily and rifaximin dose twice daily.  We will continue to monitor his mental 

status for improvement.  H&H has been stable.





1/15/2021


Patient is back to his baseline mental status.  His confusion has resolved.  He 

did have about 4 bowel movements yesterday.  He continues to make good urine and

renal function is improved.  Continue rifaximin outs twice daily dosing and 

current lactulose dose.  Discussed with him heavily to not resume Aldactone upon

discharge.  I also spoke with his sister about her care plan of care.  He will 

be following up with his gastroenterologist at [place].  We will monitor 

him for today and ensure he is adequate.  He has worked with physical therapy 

and he has been set up for home health.  I will switch his Lasix from IV to p.o.

 He is receiving repletion with IV magnesium sulfate 4 g due to his 

hypomagnesemia.  We will check more labs in the morning prior to discharge 

tomorrow.





- Additional Information


Resuscitation Status: Do Not Resuscitate


Discharge Diet: As Tolerated, Cardiac


Discharge Activity: Activity As Tolerated


Referrals: 


MITZI GALVEZ MD [PEDIATRICS] - 01/18/21 1:15 pm


PATRICK VALVERDE MD [NO LOCAL MD] - 


Prescriptions: 


Lactulose [Cephulac Syrup 20 gm/30 ml Udcup] 40 gm PO BID 30 Days  udc


Cephalexin Monohydrate [Keflex 500 mg Capsule] 500 mg PO QID 6 Days #24 capsule


Furosemide [Lasix 40 mg Tablet] 40 mg PO BID 30 Days #60


Magnesium Oxide [Mag-Ox 400 mg Tablet] 400 mg PO BID #60 tablet


Potassium Chloride 20 meq PO DAILY #20 tab.er.prt


Pantoprazole Sodium [Protonix 40 mg Dr Tablet] 40 mg PO Q6AM 30 Days #30 

tablet.


Vitamin B Complex [Vitamin B Complex Tablet] 1 tab PO DAILY 30 Days #30 tablet


Rifaximin [Xifaxan 550 mg Tablet] 550 mg PO Q12 #60


Home Medications: 








Carvedilol [Coreg 12.5 mg Tablet] 12.5 mg PO Q12 08/30/20 


Gabapentin [Neurontin] 800 mg PO BID 08/30/20 


Omeprazole 40 mg PO DAILY 08/30/20 


Allopurinol [Zyloprim 100 mg Tablet] 100 mg PO DAILY 01/08/21 


Sildenafil Citrate 100 mg PO DAILYP PRN 01/08/21 


Pantoprazole Sodium [Protonix 40 mg Dr Tablet] 40 mg PO Q6AM 30 Days #30 

tablet. 01/11/21 


Vitamin B Complex [Vitamin B Complex Tablet] 1 tab PO DAILY 30 Days #30 tablet 

01/11/21 


Cephalexin Monohydrate [Keflex 500 mg Capsule] 500 mg PO QID 6 Days #24 capsule 

01/16/21 


Furosemide [Lasix 40 mg Tablet] 40 mg PO BID 30 Days #60 01/16/21 


Lactulose [Cephulac Syrup 20 gm/30 ml Udcup] 40 gm PO BID 30 Days  udc 01/16/21 


Magnesium Oxide [Mag-Ox 400 mg Tablet] 400 mg PO BID #60 tablet 01/16/21 


Potassium Chloride 20 meq PO DAILY #20 tab.er.prt 01/16/21 


Rifaximin [Xifaxan 550 mg Tablet] 550 mg PO Q12 #60 01/16/21 











History of Present Illiness


History of Present Illness: 


According to admitting provider:


SULY VILLALPANDO is a 60 year old male with past medical history significant for 

cirrhosis, chronic diastolic CHF, CKD 3B, chronic bilateral lower extremity 

edema, chronic blood loss anemia, hemorrhoids, chronic vision loss due to 

childhood trauma who presents to the ED with a 1 week history of progressive 

shortness of breath and generalized weakness.  Patient states he has been seeing

bright red blood on the toilet paper when he wipes after bowel movements 

although he admits this has been going on for years and seems to be worse 

lately.  He states he followed with GI outpatient in the past but this was 

before he moved here from [place] approximately 6 months ago.  Patient had a 

recent admission on 9/2020 for hyponatremia and he was taken off spironolactone.

 Patient states he has still been taking this medication along with Lasix 

however.  Sodium is lower than previous value at discharge, currently at 126.5. 

He also has ISSAC with creatinine up to 2.41 and he states he has been gaining 

weight and taking on fluid in his abdomen and legs which is worse than usual.  

He previously have a history of alcohol abuse but states he has not drank in 

quite some time now.  General surgery consulted on admission and planning upper 

and lower endoscopies after patient is transfused to hemoglobin greater than 7. 

Transfusing 1 unit PRBC on admission followed by IV Lasix.  We also have him on 

sodium chloride tablets.  He is highly likely to continue having chronic hypo

natremia due to cirrhosis and chronic volume overload and this will be 

challenging to control with diuretics and other medications.











Hospital Course


Hospital Course: 


Patient was admitted to the hospital for progressive shortness of breath with 

concerns for acute GI bleed, symptomatic anemia in addition to chronic fluid 

overload state from his cirrhosis.  On presentation, patient was hemodynamically

stable.  However his hemoglobin was down to 6.8 from a baseline of about 9 3 

months ago.  He received 3 units of blood transfusion during this hospitalizati

on.  He was also noted to be significantly hyponatremic at 126 and hyperkalemic 

at 5.7.  His electrolyte abnormalities were thought to be due to the fact that 

he was still taking the Aldactone that he had been told to stop after his last 

admission.  Was placed on Lasix IV.  The dose was later increased to twice 

daily.  He has diuresed extremely well.  Regarding his GI bleed, he underwent 

upper and lower endoscopy.  EGD was normal and showed no varices.  Colonoscopy 

revealed actively bleeding diverticulum which was injected with epinephrine to 

achieve hemostasis.  He did develop hepatic encephalopathy following this.  He 

has been given high dose of lactulose and his rifaximin was increased to twice 

daily which is actually what he was noted to be taking before but he was only 

taking rifaximin daily at home.  His mental status improved subsequently and his

energy levels went down.  He has been back to his baseline mental status for the

past 2 days and is ready for discharge planning.  I discussed the plan of care 

extensively with him as well as his sister.  I did give him a few days of 

antibiotics with Keflex for the erythema or warmth in his right lower extremely 

which may be indicative of mild cellulitis.  I have advised him on a low-sodium 

diet.  He is scheduled to follow-up with his primary care provider on Monday and

I have encouraged him to follow-up with his GI doctor within 2 weeks.





Physical Exam


Vital Signs: 


                                        











Temp Pulse Resp BP Pulse Ox


 


 98.1 F   83   18   140/60 H  95 


 


 01/16/21 10:00  01/16/21 08:25  01/16/21 08:25  01/16/21 08:25  01/16/21 08:25








                                 Intake & Output











 01/15/21 01/16/21 01/17/21





 06:59 06:59 06:59


 


Intake Total 1400 2390 200


 


Output Total 3850 1720 


 


Balance -2450 670 200


 


Weight 126.1 kg 125.2 kg 











General appearance: PRESENT: no acute distress, cooperative


Neck exam: ABSENT: JVD


Respiratory exam: PRESENT: clear to auscultation karma, symmetrical, unlabored.  

ABSENT: tachypnea, wheezes


Cardiovascular exam: PRESENT: RRR, +S1, +S2.  ABSENT: tachycardia


GI/Abdominal exam: PRESENT: soft.  ABSENT: rebound, rigid, tenderness


Extremities exam: PRESENT: pedal edema, +2 edema


Musculoskeletal exam: PRESENT: ambulatory


Neurological exam: PRESENT: alert, awake, oriented to person, oriented to place,

oriented to time, oriented to situation


Psychiatric exam: ABSENT: agitated, anxious





Results


Laboratory Results: 


                                        











WBC  4.8 10^3/uL (4.0-10.5)   01/15/21  05:14    


 


RBC  2.68 10^6/uL (4.35-5.55)  L  01/15/21  05:14    


 


Hgb  8.4 g/dL (13.5-17.0)  L  01/15/21  05:14    


 


Hct  24.7 % (37.9-51.0)  L  01/15/21  05:14    


 


MCV  92 fl (80-97)   01/15/21  05:14    


 


MCH  31.3 pg (27.0-33.4)   01/15/21  05:14    


 


MCHC  33.9 g/dL (32.0-36.0)   01/15/21  05:14    


 


RDW  20.3 % (11.5-14.0)  H  01/15/21  05:14    


 


Plt Count  58 10^3/uL (150-450)  L  01/15/21  05:14    


 


Lymph % (Auto)  Not Reportable   01/12/21  09:52    


 


Mono % (Auto)  Not Reportable   01/12/21  09:52    


 


Eos % (Auto)  Not Reportable   01/12/21  09:52    


 


Baso % (Auto)  Not Reportable   01/12/21  09:52    


 


Reticulocyte #  Cancelled   01/08/21  07:36    


 


Absolute Neuts (auto)  Not Reportable   01/12/21  09:52    


 


Absolute Lymphs (auto)  Not Reportable   01/12/21  09:52    


 


Absolute Monos (auto)  Not Reportable   01/12/21  09:52    


 


Absolute Eos (auto)  Not Reportable   01/12/21  09:52    


 


Absolute Basos (auto)  Not Reportable   01/12/21  09:52    


 


Total Counted  100   01/12/21  09:52    


 


Seg Neutrophils %  Not Reportable   01/12/21  09:52    


 


Seg Neuts % (Manual)  53 % (42-78)   01/12/21  09:52    


 


Lymphocytes % (Manual)  19 % (13-45)   01/12/21  09:52    


 


Atypical Lymphs %  3 % (0)   01/12/21  09:52    


 


Monocytes % (Manual)  20 % (3-13)  H  01/12/21  09:52    


 


Eosinophils % (Manual)  5 % (0-6)   01/12/21  09:52    


 


Basophils % (Manual)  0 % (0-2)   01/12/21  09:52    


 


Abs Neuts (Manual)  2.5 10^3/uL (1.7-8.2)   01/12/21  09:52    


 


Abs Lymphs (Manual)  1.0 10^3/uL (0.5-4.7)   01/12/21  09:52    


 


Abs Monocytes (Manual)  0.9 10^3/uL (0.1-1.4)   01/12/21  09:52    


 


Absolute Eos (Manual)  0.2 10^3/uL (0.0-0.6)   01/12/21  09:52    


 


Abs Basophils (Manual)  0.0 10^3/uL (0.0-0.2)   01/12/21  09:52    


 


Reticulocyte # (manual)  Cancelled   01/08/21  07:36    


 


Platelet Estimate  Cancelled   01/08/21  07:36    


 


Platelet Comment  DECREASED   01/12/21  09:52    


 


Polychromasia  1+   01/12/21  09:52    


 


Poikilocytosis  1+   01/12/21  09:52    


 


Anisocytosis  3+   01/12/21  09:52    


 


Target Cells  SLIGHT   01/12/21  09:52    


 


Tear Drop Cells  SLIGHT   01/12/21  09:52    


 


Ovalocytes  1+   01/12/21  09:52    


 


Schistocytes  SLIGHT   01/12/21  09:52    


 


Retic Count  Cancelled   01/08/21  07:36    


 


Retic Count (manual)  Cancelled   01/08/21  07:36    


 


Retic Count (auto)  Cancelled   01/08/21  07:36    


 


Absolute Retic  Cancelled   01/08/21  07:36    


 


Carbonic Acid  1.08 mmol/L (1.05-1.35)   01/12/21  05:55    


 


HCO3/H2CO3 Ratio  22:1   01/12/21  05:55    


 


ABG pH  7.46  (7.35-7.45)  H  01/12/21  05:55    


 


ABG pCO2  36.0 mmHg (35-45)   01/12/21  05:55    


 


ABG pO2  73.3 mmHg ()  L  01/12/21  05:55    


 


ABG HCO3  24.8 mmol/L (20-24)  H  01/12/21  05:55    


 


ABG Total CO2  25.9 mmol/L (23-27)   01/12/21  05:55    


 


ABG O2 Saturation  95.5 % (94-98)   01/12/21  05:55    


 


ABG Base Excess  1.0 mmol/L  01/12/21  05:55    


 


FiO2  21%   01/12/21  05:55    


 


Sodium  133.9 mmol/L (137-145)  L  01/16/21  05:21    


 


Potassium  4.0 mmol/L (3.6-5.0)   01/16/21  05:21    


 


Chloride  98 mmol/L ()   01/16/21  05:21    


 


Carbon Dioxide  30 mmol/L (22-30)   01/16/21  05:21    


 


Anion Gap  6  (5-19)   01/16/21  05:21    


 


BUN  21 mg/dL (7-20)  H  01/16/21  05:21    


 


Creatinine  1.48 mg/dL (0.52-1.25)  H  01/16/21  05:21    


 


Est GFR ( Amer)  59  (>60)  L  01/16/21  05:21    


 


Est GFR (Non-Af Amer)  Cancelled   01/09/21  05:47    


 


Est GFR (MDRD) Non-Af  48  (>60)  L  01/16/21  05:21    


 


Glucose  114 mg/dL ()  H  01/16/21  05:21    


 


POC Glucose  120 mg/dL ()  H  01/16/21  11:18    


 


Hemoglobin A1c %  4.4 % (4.7-6.0)  L  01/14/21  06:07    


 


Calcium  8.9 mg/dL (8.4-10.2)   01/16/21  05:21    


 


Phosphorus  4.3 mg/dL (2.5-4.5)   01/09/21  08:03    


 


Magnesium  1.5 mg/dL (1.6-2.3)  L  01/16/21  05:21    


 


Iron  88.5 ug/dL ()   01/08/21  08:29    


 


TIBC  407 ug/dL (250-450)   01/08/21  08:29    


 


% Saturation  22 %  01/08/21  08:29    


 


Ferritin  29.60 ng/mL (17.9-464.0)   01/08/21  08:29    


 


Total Bilirubin  1.6 mg/dL (0.2-1.3)  H  01/16/21  05:21    


 


Direct Bilirubin  0.7 mg/dL (0.0-0.4)  H  01/16/21  05:21    


 


Neonat Total Bilirubin  Not Reportable   01/16/21  05:21    


 


Neonat Direct Bilirubin  Not Reportable   01/16/21  05:21    


 


Neonat Indirect Bili  Not Reportable   01/16/21  05:21    


 


AST  74 U/L (17-59)  H  01/16/21  05:21    


 


ALT  26 U/L (<50)   01/16/21  05:21    


 


Alkaline Phosphatase  172 U/L ()  H  01/16/21  05:21    


 


Ammonia  77.3 umol/L (9-33)  H  01/15/21  05:14    


 


Creatine Kinase  116 U/L ()   01/07/21  16:50    


 


CK-MB (CK-2)  2.38 ng/mL (<4.55)   01/07/21  16:50    


 


Troponin I  < 0.012 ng/mL  01/07/21  16:50    


 


NT-Pro-B Natriuret Pep  1760 pg/mL (<125)  H  01/07/21  16:50    


 


Total Protein  5.9 g/dL (6.3-8.2)  L  01/16/21  05:21    


 


Albumin  2.7 g/dL (3.5-5.0)  L  01/16/21  05:21    


 


EGFR   Cancelled   01/09/21  05:47    


 


Vitamin B12  > 1000.0 pg/mL (239-931)  H  01/08/21  08:29    


 


Folate  19.60 ng/mL (>2.76)   01/08/21  08:29    


 


Urine Color  YELLOW   01/07/21  18:21    


 


Urine Appearance  CLEAR   01/07/21  18:21    


 


Urine pH  6.0  (5.0-9.0)   01/07/21  18:21    


 


Ur Specific Gravity  1.011   01/07/21  18:21    


 


Urine Protein  NEGATIVE mg/dL (NEGATIVE)   01/07/21  18:21    


 


Urine Glucose (UA)  NEGATIVE mg/dL (NEGATIVE)   01/07/21  18:21    


 


Urine Ketones  NEGATIVE mg/dL (NEGATIVE)   01/07/21  18:21    


 


Urine Blood  NEGATIVE  (NEGATIVE)   01/07/21  18:21    


 


Urine Nitrite  NEGATIVE  (NEGATIVE)   01/07/21  18:21    


 


Urine Bilirubin  NEGATIVE  (NEGATIVE)   01/07/21  18:21    


 


Urine Urobilinogen  NEGATIVE mg/dL (<2.0)   01/07/21  18:21    


 


Ur Leukocyte Esterase  NEGATIVE  (NEGATIVE)   01/07/21  18:21    


 


Urine WBC (Auto)  2 /HPF  01/07/21  18:21    


 


Urine RBC (Auto)  0 /HPF  01/07/21  18:21    


 


U Hyaline Cast (Auto)  11 /LPF  01/07/21  18:21    


 


Urine Bacteria (Auto)  1+ /HPF  01/07/21  18:21    


 


Squamous Epi Cells Auto  1 /HPF  01/07/21  18:21    


 


Urine Mucus (Auto)  RARE /LPF  01/07/21  18:21    


 


Urine Ascorbic Acid  NEGATIVE  (NEGATIVE)   01/07/21  18:21    


 


POC Stool Occult Blood  POSITIVE  (NEGATIVE)   01/07/21  21:43    


 


Influenza A (RT-PCR)  NEGATIVE  (NEGATIVE)   01/13/21  10:05    


 


Influenza B (RT-PCR)  NEGATIVE  (NEGATIVE)   01/13/21  10:05    


 


RSV (RT-PCR)  NEGATIVE  (NEGATIVE)   01/13/21  10:05    


 


SARS-CoV-2 Rap RNA(RT-PCR)  NEGATIVE  (NEGATIVE)   01/13/21  10:05    


 


Slides for Path Review  Cancelled   01/08/21  07:36    


 


Blood Type  A POSITIVE   01/11/21  09:39    


 


Blood Type Confirm  A POSITIVE   01/07/21  23:38    


 


Antibody Screen  NEGATIVE   01/11/21  09:39    


 


Crossmatch  See Detail   01/11/21  09:39    








                                        











  01/07/21 01/07/21





  16:50 16:50


 


CK-MB (CK-2)  2.38 


 


Troponin I  < 0.012 


 


NT-Pro-B Natriuret Pep   1760 H











Impressions: 


                                        





Chest X-Ray  01/07/21 16:47


IMPRESSION:  Moderate cardiomegaly with mild pulmonary edema.  No focal 

consolidation.


 








Abdomen Ultrasound  01/08/21 00:00


IMPRESSION:  Fatty liver without other significant findings.


 














Plan


Time Spent: Greater than 30 Minutes





Stroke


Is this a Stroke Patient?: No





Acute Heart Failure


Is this a Heart Failure Patient?: No